# Patient Record
Sex: FEMALE | Race: WHITE | NOT HISPANIC OR LATINO
[De-identification: names, ages, dates, MRNs, and addresses within clinical notes are randomized per-mention and may not be internally consistent; named-entity substitution may affect disease eponyms.]

---

## 2022-09-13 RX ADMIN — HYDROMORPHONE HYDROCHLORIDE 1 MILLIGRAM(S): 2 INJECTION INTRAMUSCULAR; INTRAVENOUS; SUBCUTANEOUS at 00:40

## 2023-09-10 ENCOUNTER — TRANSCRIPTION ENCOUNTER (OUTPATIENT)
Age: 73
End: 2023-09-10

## 2023-09-11 ENCOUNTER — RESULT REVIEW (OUTPATIENT)
Age: 73
End: 2023-09-11

## 2023-09-11 ENCOUNTER — INPATIENT (INPATIENT)
Facility: HOSPITAL | Age: 73
LOS: 10 days | Discharge: HOME HEALTH | DRG: 853 | End: 2023-09-22
Attending: INTERNAL MEDICINE | Admitting: INTERNAL MEDICINE
Payer: MEDICARE

## 2023-09-11 VITALS
TEMPERATURE: 102 F | RESPIRATION RATE: 20 BRPM | OXYGEN SATURATION: 91 % | HEART RATE: 124 BPM | DIASTOLIC BLOOD PRESSURE: 66 MMHG | SYSTOLIC BLOOD PRESSURE: 127 MMHG | WEIGHT: 146.39 LBS

## 2023-09-11 DIAGNOSIS — K35.32 ACUTE APPENDICITIS WITH PERFORATION, LOCALIZED PERITONITIS, AND GANGRENE, WITHOUT ABSCESS: ICD-10-CM

## 2023-09-11 LAB
ALBUMIN SERPL ELPH-MCNC: 2.2 G/DL — LOW (ref 3.5–5)
ALBUMIN SERPL ELPH-MCNC: 3.3 G/DL — LOW (ref 3.5–5)
ALP SERPL-CCNC: 51 U/L — SIGNIFICANT CHANGE UP (ref 40–120)
ALP SERPL-CCNC: 71 U/L — SIGNIFICANT CHANGE UP (ref 40–120)
ALT FLD-CCNC: 17 U/L DA — SIGNIFICANT CHANGE UP (ref 10–60)
ALT FLD-CCNC: 22 U/L DA — SIGNIFICANT CHANGE UP (ref 10–60)
ANION GAP SERPL CALC-SCNC: 4 MMOL/L — LOW (ref 5–17)
ANION GAP SERPL CALC-SCNC: 8 MMOL/L — SIGNIFICANT CHANGE UP (ref 5–17)
APPEARANCE UR: CLEAR — SIGNIFICANT CHANGE UP
APTT BLD: 32.3 SEC — SIGNIFICANT CHANGE UP (ref 24.5–35.6)
AST SERPL-CCNC: 33 U/L — SIGNIFICANT CHANGE UP (ref 10–40)
AST SERPL-CCNC: 33 U/L — SIGNIFICANT CHANGE UP (ref 10–40)
BACTERIA # UR AUTO: ABNORMAL /HPF
BASE EXCESS BLDA CALC-SCNC: 0.3 MMOL/L — SIGNIFICANT CHANGE UP (ref -2–3)
BASOPHILS # BLD AUTO: 0.02 K/UL — SIGNIFICANT CHANGE UP (ref 0–0.2)
BASOPHILS # BLD AUTO: 0.03 K/UL — SIGNIFICANT CHANGE UP (ref 0–0.2)
BASOPHILS NFR BLD AUTO: 0.2 % — SIGNIFICANT CHANGE UP (ref 0–2)
BASOPHILS NFR BLD AUTO: 0.2 % — SIGNIFICANT CHANGE UP (ref 0–2)
BILIRUB SERPL-MCNC: 1.3 MG/DL — HIGH (ref 0.2–1.2)
BILIRUB SERPL-MCNC: 2.6 MG/DL — HIGH (ref 0.2–1.2)
BILIRUB UR-MCNC: NEGATIVE — SIGNIFICANT CHANGE UP
BLD GP AB SCN SERPL QL: SIGNIFICANT CHANGE UP
BLD GP AB SCN SERPL QL: SIGNIFICANT CHANGE UP
BLOOD GAS COMMENTS ARTERIAL: SIGNIFICANT CHANGE UP
BUN SERPL-MCNC: 20 MG/DL — HIGH (ref 7–18)
BUN SERPL-MCNC: 29 MG/DL — HIGH (ref 7–18)
CALCIUM SERPL-MCNC: 7.4 MG/DL — LOW (ref 8.4–10.5)
CALCIUM SERPL-MCNC: 9.5 MG/DL — SIGNIFICANT CHANGE UP (ref 8.4–10.5)
CHLORIDE SERPL-SCNC: 105 MMOL/L — SIGNIFICANT CHANGE UP (ref 96–108)
CHLORIDE SERPL-SCNC: 110 MMOL/L — HIGH (ref 96–108)
CO2 SERPL-SCNC: 25 MMOL/L — SIGNIFICANT CHANGE UP (ref 22–31)
CO2 SERPL-SCNC: 26 MMOL/L — SIGNIFICANT CHANGE UP (ref 22–31)
COLOR SPEC: SIGNIFICANT CHANGE UP
CREAT SERPL-MCNC: 1.13 MG/DL — SIGNIFICANT CHANGE UP (ref 0.5–1.3)
CREAT SERPL-MCNC: 1.41 MG/DL — HIGH (ref 0.5–1.3)
DIFF PNL FLD: ABNORMAL
EGFR: 39 ML/MIN/1.73M2 — LOW
EGFR: 51 ML/MIN/1.73M2 — LOW
EOSINOPHIL # BLD AUTO: 0 K/UL — SIGNIFICANT CHANGE UP (ref 0–0.5)
EOSINOPHIL # BLD AUTO: 0.08 K/UL — SIGNIFICANT CHANGE UP (ref 0–0.5)
EOSINOPHIL NFR BLD AUTO: 0 % — SIGNIFICANT CHANGE UP (ref 0–6)
EOSINOPHIL NFR BLD AUTO: 0.6 % — SIGNIFICANT CHANGE UP (ref 0–6)
EPI CELLS # UR: PRESENT
GLUCOSE SERPL-MCNC: 126 MG/DL — HIGH (ref 70–99)
GLUCOSE SERPL-MCNC: 129 MG/DL — HIGH (ref 70–99)
GLUCOSE UR QL: NEGATIVE MG/DL — SIGNIFICANT CHANGE UP
GRAN CASTS # UR COMP ASSIST: PRESENT
HCO3 BLDA-SCNC: 25 MMOL/L — SIGNIFICANT CHANGE UP (ref 21–28)
HCT VFR BLD CALC: 33.4 % — LOW (ref 34.5–45)
HCT VFR BLD CALC: 41.6 % — SIGNIFICANT CHANGE UP (ref 34.5–45)
HGB BLD-MCNC: 11 G/DL — LOW (ref 11.5–15.5)
HGB BLD-MCNC: 13.8 G/DL — SIGNIFICANT CHANGE UP (ref 11.5–15.5)
HYALINE CASTS # UR AUTO: PRESENT
IMM GRANULOCYTES NFR BLD AUTO: 0.2 % — SIGNIFICANT CHANGE UP (ref 0–0.9)
IMM GRANULOCYTES NFR BLD AUTO: 0.3 % — SIGNIFICANT CHANGE UP (ref 0–0.9)
INR BLD: 1.29 RATIO — HIGH (ref 0.85–1.18)
KETONES UR-MCNC: ABNORMAL MG/DL
LACTATE SERPL-SCNC: 1 MMOL/L — SIGNIFICANT CHANGE UP (ref 0.7–2)
LACTATE SERPL-SCNC: 3.5 MMOL/L — HIGH (ref 0.7–2)
LACTATE SERPL-SCNC: 4.1 MMOL/L — CRITICAL HIGH (ref 0.7–2)
LEUKOCYTE ESTERASE UR-ACNC: ABNORMAL
LYMPHOCYTES # BLD AUTO: 1.35 K/UL — SIGNIFICANT CHANGE UP (ref 1–3.3)
LYMPHOCYTES # BLD AUTO: 1.53 K/UL — SIGNIFICANT CHANGE UP (ref 1–3.3)
LYMPHOCYTES # BLD AUTO: 11.3 % — LOW (ref 13–44)
LYMPHOCYTES # BLD AUTO: 15 % — SIGNIFICANT CHANGE UP (ref 13–44)
MAGNESIUM SERPL-MCNC: 1.6 MG/DL — SIGNIFICANT CHANGE UP (ref 1.6–2.6)
MCHC RBC-ENTMCNC: 29 PG — SIGNIFICANT CHANGE UP (ref 27–34)
MCHC RBC-ENTMCNC: 29.2 PG — SIGNIFICANT CHANGE UP (ref 27–34)
MCHC RBC-ENTMCNC: 32.9 GM/DL — SIGNIFICANT CHANGE UP (ref 32–36)
MCHC RBC-ENTMCNC: 33.2 GM/DL — SIGNIFICANT CHANGE UP (ref 32–36)
MCV RBC AUTO: 88.1 FL — SIGNIFICANT CHANGE UP (ref 80–100)
MCV RBC AUTO: 88.1 FL — SIGNIFICANT CHANGE UP (ref 80–100)
MONOCYTES # BLD AUTO: 0.63 K/UL — SIGNIFICANT CHANGE UP (ref 0–0.9)
MONOCYTES # BLD AUTO: 0.86 K/UL — SIGNIFICANT CHANGE UP (ref 0–0.9)
MONOCYTES NFR BLD AUTO: 6.3 % — SIGNIFICANT CHANGE UP (ref 2–14)
MONOCYTES NFR BLD AUTO: 7 % — SIGNIFICANT CHANGE UP (ref 2–14)
NEUTROPHILS # BLD AUTO: 11.02 K/UL — HIGH (ref 1.8–7.4)
NEUTROPHILS # BLD AUTO: 6.99 K/UL — SIGNIFICANT CHANGE UP (ref 1.8–7.4)
NEUTROPHILS NFR BLD AUTO: 77.5 % — HIGH (ref 43–77)
NEUTROPHILS NFR BLD AUTO: 81.4 % — HIGH (ref 43–77)
NITRITE UR-MCNC: NEGATIVE — SIGNIFICANT CHANGE UP
NRBC # BLD: 0 /100 WBCS — SIGNIFICANT CHANGE UP (ref 0–0)
NRBC # BLD: 0 /100 WBCS — SIGNIFICANT CHANGE UP (ref 0–0)
PCO2 BLDA: 42 MMHG — HIGH (ref 32–35)
PH BLDA: 7.39 — SIGNIFICANT CHANGE UP (ref 7.35–7.45)
PH UR: 6.5 — SIGNIFICANT CHANGE UP (ref 5–8)
PHOSPHATE SERPL-MCNC: 2.3 MG/DL — LOW (ref 2.5–4.5)
PLATELET # BLD AUTO: 165 K/UL — SIGNIFICANT CHANGE UP (ref 150–400)
PLATELET # BLD AUTO: 197 K/UL — SIGNIFICANT CHANGE UP (ref 150–400)
PO2 BLDA: 35 MMHG — CRITICAL LOW (ref 83–108)
POTASSIUM SERPL-MCNC: 3.3 MMOL/L — LOW (ref 3.5–5.3)
POTASSIUM SERPL-MCNC: 3.4 MMOL/L — LOW (ref 3.5–5.3)
POTASSIUM SERPL-SCNC: 3.3 MMOL/L — LOW (ref 3.5–5.3)
POTASSIUM SERPL-SCNC: 3.4 MMOL/L — LOW (ref 3.5–5.3)
PROT SERPL-MCNC: 5.4 G/DL — LOW (ref 6–8.3)
PROT SERPL-MCNC: 7.5 G/DL — SIGNIFICANT CHANGE UP (ref 6–8.3)
PROT UR-MCNC: 300 MG/DL
PROTHROM AB SERPL-ACNC: 14.6 SEC — HIGH (ref 9.5–13)
RAPID RVP RESULT: SIGNIFICANT CHANGE UP
RBC # BLD: 3.79 M/UL — LOW (ref 3.8–5.2)
RBC # BLD: 4.72 M/UL — SIGNIFICANT CHANGE UP (ref 3.8–5.2)
RBC # FLD: 13.9 % — SIGNIFICANT CHANGE UP (ref 10.3–14.5)
RBC # FLD: 13.9 % — SIGNIFICANT CHANGE UP (ref 10.3–14.5)
RBC CASTS # UR COMP ASSIST: >50 /HPF — HIGH (ref 0–4)
SAO2 % BLDA: 56 % — SIGNIFICANT CHANGE UP
SARS-COV-2 RNA SPEC QL NAA+PROBE: SIGNIFICANT CHANGE UP
SODIUM SERPL-SCNC: 139 MMOL/L — SIGNIFICANT CHANGE UP (ref 135–145)
SODIUM SERPL-SCNC: 139 MMOL/L — SIGNIFICANT CHANGE UP (ref 135–145)
SP GR SPEC: 1.03 — SIGNIFICANT CHANGE UP (ref 1–1.03)
UROBILINOGEN FLD QL: 1 MG/DL — SIGNIFICANT CHANGE UP (ref 0.2–1)
WBC # BLD: 13.55 K/UL — HIGH (ref 3.8–10.5)
WBC # BLD: 9.02 K/UL — SIGNIFICANT CHANGE UP (ref 3.8–10.5)
WBC # FLD AUTO: 13.55 K/UL — HIGH (ref 3.8–10.5)
WBC # FLD AUTO: 9.02 K/UL — SIGNIFICANT CHANGE UP (ref 3.8–10.5)
WBC UR QL: 5 /HPF — SIGNIFICANT CHANGE UP (ref 0–5)

## 2023-09-11 PROCEDURE — 99222 1ST HOSP IP/OBS MODERATE 55: CPT | Mod: 57

## 2023-09-11 PROCEDURE — 71045 X-RAY EXAM CHEST 1 VIEW: CPT | Mod: 26

## 2023-09-11 PROCEDURE — 93010 ELECTROCARDIOGRAM REPORT: CPT

## 2023-09-11 PROCEDURE — 44970 LAPAROSCOPY APPENDECTOMY: CPT

## 2023-09-11 PROCEDURE — 74177 CT ABD & PELVIS W/CONTRAST: CPT | Mod: 26,MA

## 2023-09-11 PROCEDURE — 99291 CRITICAL CARE FIRST HOUR: CPT

## 2023-09-11 PROCEDURE — 88304 TISSUE EXAM BY PATHOLOGIST: CPT | Mod: 26

## 2023-09-11 DEVICE — STAPLER COVIDIEN TRI-STAPLE 45MM PURPLE RELOAD: Type: IMPLANTABLE DEVICE | Status: FUNCTIONAL

## 2023-09-11 DEVICE — TISSEEL 10ML: Type: IMPLANTABLE DEVICE | Status: FUNCTIONAL

## 2023-09-11 RX ORDER — MAGNESIUM SULFATE 500 MG/ML
2 VIAL (ML) INJECTION ONCE
Refills: 0 | Status: COMPLETED | OUTPATIENT
Start: 2023-09-11 | End: 2023-09-11

## 2023-09-11 RX ORDER — SODIUM CHLORIDE 9 MG/ML
2100 INJECTION INTRAMUSCULAR; INTRAVENOUS; SUBCUTANEOUS ONCE
Refills: 0 | Status: COMPLETED | OUTPATIENT
Start: 2023-09-11 | End: 2023-09-11

## 2023-09-11 RX ORDER — PIPERACILLIN AND TAZOBACTAM 4; .5 G/20ML; G/20ML
3.38 INJECTION, POWDER, LYOPHILIZED, FOR SOLUTION INTRAVENOUS ONCE
Refills: 0 | Status: DISCONTINUED | OUTPATIENT
Start: 2023-09-11 | End: 2023-09-11

## 2023-09-11 RX ORDER — PANTOPRAZOLE SODIUM 20 MG/1
40 TABLET, DELAYED RELEASE ORAL DAILY
Refills: 0 | Status: DISCONTINUED | OUTPATIENT
Start: 2023-09-11 | End: 2023-09-11

## 2023-09-11 RX ORDER — SODIUM CHLORIDE 9 MG/ML
1000 INJECTION, SOLUTION INTRAVENOUS
Refills: 0 | Status: DISCONTINUED | OUTPATIENT
Start: 2023-09-11 | End: 2023-09-11

## 2023-09-11 RX ORDER — HYDROMORPHONE HYDROCHLORIDE 2 MG/ML
2 INJECTION INTRAMUSCULAR; INTRAVENOUS; SUBCUTANEOUS ONCE
Refills: 0 | Status: DISCONTINUED | OUTPATIENT
Start: 2023-09-11 | End: 2023-09-11

## 2023-09-11 RX ORDER — POTASSIUM CHLORIDE 20 MEQ
10 PACKET (EA) ORAL
Refills: 0 | Status: COMPLETED | OUTPATIENT
Start: 2023-09-11 | End: 2023-09-12

## 2023-09-11 RX ORDER — SODIUM CHLORIDE 9 MG/ML
1000 INJECTION, SOLUTION INTRAVENOUS ONCE
Refills: 0 | Status: COMPLETED | OUTPATIENT
Start: 2023-09-11 | End: 2023-09-11

## 2023-09-11 RX ORDER — PANTOPRAZOLE SODIUM 20 MG/1
40 TABLET, DELAYED RELEASE ORAL DAILY
Refills: 0 | Status: DISCONTINUED | OUTPATIENT
Start: 2023-09-12 | End: 2023-09-14

## 2023-09-11 RX ORDER — PIPERACILLIN AND TAZOBACTAM 4; .5 G/20ML; G/20ML
3.38 INJECTION, POWDER, LYOPHILIZED, FOR SOLUTION INTRAVENOUS ONCE
Refills: 0 | Status: COMPLETED | OUTPATIENT
Start: 2023-09-11 | End: 2023-09-11

## 2023-09-11 RX ORDER — HYDROMORPHONE HYDROCHLORIDE 2 MG/ML
1 INJECTION INTRAMUSCULAR; INTRAVENOUS; SUBCUTANEOUS ONCE
Refills: 0 | Status: DISCONTINUED | OUTPATIENT
Start: 2023-09-11 | End: 2023-09-11

## 2023-09-11 RX ORDER — PIPERACILLIN AND TAZOBACTAM 4; .5 G/20ML; G/20ML
3.38 INJECTION, POWDER, LYOPHILIZED, FOR SOLUTION INTRAVENOUS ONCE
Refills: 0 | Status: COMPLETED | OUTPATIENT
Start: 2023-09-12 | End: 2023-09-12

## 2023-09-11 RX ORDER — PIPERACILLIN AND TAZOBACTAM 4; .5 G/20ML; G/20ML
3.38 INJECTION, POWDER, LYOPHILIZED, FOR SOLUTION INTRAVENOUS EVERY 8 HOURS
Refills: 0 | Status: COMPLETED | OUTPATIENT
Start: 2023-09-12 | End: 2023-09-19

## 2023-09-11 RX ORDER — ACETAMINOPHEN 500 MG
1000 TABLET ORAL ONCE
Refills: 0 | Status: COMPLETED | OUTPATIENT
Start: 2023-09-11 | End: 2023-09-11

## 2023-09-11 RX ORDER — CHLORHEXIDINE GLUCONATE 213 G/1000ML
1 SOLUTION TOPICAL
Refills: 0 | Status: DISCONTINUED | OUTPATIENT
Start: 2023-09-11 | End: 2023-09-22

## 2023-09-11 RX ADMIN — Medication 100 MILLIEQUIVALENT(S): at 23:42

## 2023-09-11 RX ADMIN — Medication 25 GRAM(S): at 23:15

## 2023-09-11 RX ADMIN — SODIUM CHLORIDE 1000 MILLILITER(S): 9 INJECTION, SOLUTION INTRAVENOUS at 18:48

## 2023-09-11 RX ADMIN — PIPERACILLIN AND TAZOBACTAM 200 GRAM(S): 4; .5 INJECTION, POWDER, LYOPHILIZED, FOR SOLUTION INTRAVENOUS at 14:05

## 2023-09-11 RX ADMIN — PIPERACILLIN AND TAZOBACTAM 25 GRAM(S): 4; .5 INJECTION, POWDER, LYOPHILIZED, FOR SOLUTION INTRAVENOUS at 17:54

## 2023-09-11 RX ADMIN — Medication 400 MILLIGRAM(S): at 23:42

## 2023-09-11 RX ADMIN — HYDROMORPHONE HYDROCHLORIDE 1 MILLIGRAM(S): 2 INJECTION INTRAMUSCULAR; INTRAVENOUS; SUBCUTANEOUS at 19:00

## 2023-09-11 RX ADMIN — SODIUM CHLORIDE 500 MILLILITER(S): 9 INJECTION, SOLUTION INTRAVENOUS at 15:29

## 2023-09-11 RX ADMIN — HYDROMORPHONE HYDROCHLORIDE 1 MILLIGRAM(S): 2 INJECTION INTRAMUSCULAR; INTRAVENOUS; SUBCUTANEOUS at 18:48

## 2023-09-11 RX ADMIN — SODIUM CHLORIDE 2100 MILLILITER(S): 9 INJECTION INTRAMUSCULAR; INTRAVENOUS; SUBCUTANEOUS at 14:06

## 2023-09-11 RX ADMIN — CHLORHEXIDINE GLUCONATE 1 APPLICATION(S): 213 SOLUTION TOPICAL at 18:00

## 2023-09-11 RX ADMIN — Medication 100 MILLIEQUIVALENT(S): at 23:41

## 2023-09-11 NOTE — H&P ADULT - NSHPPHYSICALEXAM_GEN_ALL_CORE
PHYSICAL EXAM:  GENERAL: Noted to be in moderate distress, saturating well on 2L NC   HEAD:  Atraumatic, Normocephalic  EYES: EOMI, perrla   NECK: Supple  CHEST/LUNG: Clear to auscultation bilaterally; No wheeze; No crackles; No accessory muscles used  HEART: Regular rate and rhythm; No murmurs; gallops or rubs   ABDOMEN: Diffuse tenderness noted, guarding noted   EXTREMITIES:  2+ Peripheral Pulses, No cyanosis or edema  PSYCH: AAOx1 (to name only)  NEUROLOGY: Unable to fully assess neurological status, patient only following simple commands   SKIN: No rashes or lesions

## 2023-09-11 NOTE — ED ADULT NURSE NOTE - NSFALLHARMRISKINTERV_ED_ALL_ED
Assistance OOB with selected safe patient handling equipment if applicable/Assistance with ambulation/Communicate risk of Fall with Harm to all staff, patient, and family/Monitor gait and stability/Monitor for mental status changes and reorient to person, place, and time, as needed/Move patient closer to nursing station/within visual sight of ED staff/Provide visual cue: red socks, yellow wristband, yellow gown, etc/Reinforce activity limits and safety measures with patient and family/Toileting schedule using arm’s reach rule for commode and bathroom/Use of alarms - bed, stretcher, chair and/or video monitoring/Bed in lowest position, wheels locked, appropriate side rails in place/Call bell, personal items and telephone in reach/Instruct patient to call for assistance before getting out of bed/chair/stretcher/Non-slip footwear applied when patient is off stretcher/[place] to call system/Physically safe environment - no spills, clutter or unnecessary equipment/Purposeful Proactive Rounding/Room/bathroom lighting operational, light cord in reach

## 2023-09-11 NOTE — ED PROVIDER NOTE - CLINICAL SUMMARY MEDICAL DECISION MAKING FREE TEXT BOX
We will start sepsis bundle and obtain CT abdomen pelvis for possible intra-abdominal cause.  Will require admission.

## 2023-09-11 NOTE — ED PROVIDER NOTE - PHYSICAL EXAMINATION
Exam:  General: Patient ill appearing, febrile, tachycardic otherwise vital signs within normal limits  HEENT: airway patent with dry mucous membranes  Cardiac: regular tachycardia S1/S2 with strong peripheral pulses  Respiratory: lungs clear, mildly tachypneic  GI: abdomen diffusely tender

## 2023-09-11 NOTE — H&P ADULT - HISTORY OF PRESENT ILLNESS
Patient is a 73y old  Female who presents with a chief complaint of     HPI:      Allergies    sulfa drugs (Unknown)    Intolerances         Patient is a 73y old  Female with hx of COPD (active smoker), peripheral neuropathy presents as per ED documentation with abdominal pain. Patient unable to provide history, and history is provided by Patient sister (Vlad, [phone]) and patient's  Leroy ([phone]). Patient's sister states that on Saturday night patient started to complain of abdominal pain, and had 1 episode of NBNB emesis. Denies any changes to bowel habits, hematochei        Patient is a 73y old  Female with hx of COPD (active smoker, not on home oxygen), peripheral neuropathy presents as per ED documentation with abdominal pain. Patient unable to provide history, and history is provided by Patient sister (Vlad, [phone]) and patient's  Leroy ([phone]). Patient's sister states that on Saturday night patient started to complain of abdominal pain, and had 1 episode of NBNB emesis. Denies any changes to bowel habits, hematochezia. Patient's sister denies fevers or chills or prior episodes. At baseline patient is fully functional, denies use of ambulatory devices.     [facility] conciliation needs to be done.

## 2023-09-11 NOTE — PATIENT PROFILE ADULT - FALL HARM RISK - HARM RISK INTERVENTIONS
Assistance with ambulation/Assistance OOB with selected safe patient handling equipment/Communicate Risk of Fall with Harm to all staff/Monitor for mental status changes/Monitor gait and stability/Move patient closer to nurses' station/Reinforce activity limits and safety measures with patient and family/Reorient to person, place and time as needed/Sit up slowly, dangle for a short time, stand at bedside before walking/Tailored Fall Risk Interventions/Toileting schedule using arm’s reach rule for commode and bathroom/Use of alarms - bed, chair and/or voice tab/Visual Cue: Yellow wristband and red socks/Bed in lowest position, wheels locked, appropriate side rails in place/Call bell, personal items and telephone in reach/Instruct patient to call for assistance before getting out of bed or chair/Non-slip footwear when patient is out of bed/[place] to call system/Physically safe environment - no spills, clutter or unnecessary equipment/Purposeful Proactive Rounding/Room/bathroom lighting operational, light cord in reach

## 2023-09-11 NOTE — BRIEF OPERATIVE NOTE - OPERATION/FINDINGS
Peritonitis, acute appendicitis with gangrene and perforation. Laparoscopic appendectomy, 3 L irrigation. 2 MARY LOU drains

## 2023-09-11 NOTE — ED PROVIDER NOTE - OBJECTIVE STATEMENT
73-year-old woman brought in by EMS for altered mental status associated with fevers and abdominal pain.  Began having symptoms about 3 days ago that are progressively worsening until today when she was confused which she does not normally.  She was found by EMS to be hypoxic in the field to 90 on room air.  Patient has a history of active smoking.  Patient unable to give reliable history at this time.  History obtained at bedside from patient's  and sister.

## 2023-09-11 NOTE — BRIEF OPERATIVE NOTE - NSICDXBRIEFPOSTOP_GEN_ALL_CORE_FT
POST-OP DIAGNOSIS:  Acute appendicitis with perforation, localized peritonitis, abscess, and gangrene 11-Sep-2023 21:15:50  Adriel Melendez

## 2023-09-11 NOTE — H&P ADULT - ATTENDING COMMENTS
IMP: This is a 73 yr old woman  with  COPD,  neuropathy presents with abdominal pain. Upon evaluation in ED, patient noted with temp of 101.8, BP of 127/66, saturating 91% on Room air with hr of 124bpm. Physical examination noted with diffuse tenderness in abdominal region with guarding. Labs significant for leukocytosis of 13, lactate of 3.5 and scr of 1.41. CT abdomen and pelvis noted for free air in peritoneum and findings for perforated acute appendicitis with findings of 6mm gall bladder lesion and right adnexal lesion. CXR negative. Surgery consulted and patient is scheduled for urgent ex lap, abdominal washout and possible bowel resection. ICU consulted for sepsis 2/2 perforated appendicitis/ peritonitis  Patient admitted to ICU for sepsis 2/2 acute appendicitis.     - Acute Encephalopathy   - Sepsis  - Acute perforated appendicitis  - COPD  - Neuropathy  - Elevated lactate  - Gall bladder wall lesion   - MAYRA  - RIght Adnexal Lesion    - Active smoker      Plan     - Admit to ICU   - No sedation   - Pain control   - Encephalopathy due to sepsis   - O2 supp as needed   - Hemodynamic monitoring   - IVF  - Trend lactate   - Iv antibx to cover gram neg   - F/u cultures  - Surgery for exploration   - ID eval   - NPO   - Asp precaution   - Lindo cath   - Pelvic US to evaluate adnexa lesion vs MRI of abd   - GI for gallbladder lesion   - DVT GI prophy

## 2023-09-11 NOTE — ED PROVIDER NOTE - PROGRESS NOTE DETAILS
CT AP independently interpreted by myself, noting appendicitis with localized perforation and gas tracking.  Spoke with radiology who agrees.  Surgery consulted and requesting ICU consultation.

## 2023-09-11 NOTE — H&P ADULT - CONVERSATION DETAILS
Spoke to patient's HCP  Leroy and he states he would like CPR and all measures that can be taken to prolong his wife's lifespan. Patient is FULL CODE.

## 2023-09-11 NOTE — ED PROVIDER NOTE - NS_EDPROVIDERDISPOUSERTYPE_ED_A_ED
Attending Attestation (For Attendings USE Only)... PAST MEDICAL HISTORY:  Atrial fibrillation     PVCs (premature ventricular contractions)     Strabismus

## 2023-09-11 NOTE — H&P ADULT - NSICDXPASTMEDICALHX_GEN_ALL_CORE_FT
PAST MEDICAL HISTORY:  H/O neuropathy     History of COPD     HLD (hyperlipidemia)     HTN (hypertension)

## 2023-09-11 NOTE — ED ADULT TRIAGE NOTE - CHIEF COMPLAINT QUOTE
PT BIBA AS NOTIFICATION FOR AMS, FEVER, COUGH AND ABDOMINAL PAIN X 3 DAYS. EMS REPORTS O2SAT IN THE FIELD WAS 90%

## 2023-09-11 NOTE — CONSULT NOTE ADULT - SUBJECTIVE AND OBJECTIVE BOX
74 yo female pmhx htn to ER c/o 3days abdominal pain, no n/v no fever ,no cp. no change in BM. Pt states she has had chronic pain for some time however last 3 days worse pain. IN ER CT suggestive of perforated appendicitis. Pt clinically septic with peritonitis noted in ER.  unknown Wt loss. no hx hemetemesis or hematochezia.  never colonoscopy.    ICU Vital Signs Last 24 Hrs  T(C): 38.8 (11 Sep 2023 11:03), Max: 38.8 (11 Sep 2023 11:03)  T(F): 101.8 (11 Sep 2023 11:03), Max: 101.8 (11 Sep 2023 11:03)  HR: 124 (11 Sep 2023 11:03) (124 - 124)  BP: 127/66 (11 Sep 2023 11:03) (127/66 - 127/66)  BP(mean): --  ABP: --  ABP(mean): --  RR: 20 (11 Sep 2023 11:03) (20 - 20)  SpO2: 91% (11 Sep 2023 11:03) (91% - 91%)    O2 Parameters below as of 11 Sep 2023 11:03  Patient On (Oxygen Delivery Method): room air        pt confused  Abd: generalized tenderness, +rebound tenderness all quasrants worse rlq  no hernias, no masses palpable  lngs:scattered ralses  EXT: no cce  cardiac s1 s2 tacchycardia                          13.8   13.55 )-----------( 197      ( 11 Sep 2023 12:45 )             41.6   09-11    139  |  105  |  29<H>  ----------------------------<  126<H>  3.3<L>   |  26  |  1.41<H>    Ca    9.5      11 Sep 2023 12:45    TPro  7.5  /  Alb  3.3<L>  /  TBili  2.6<H>  /  DBili  x   /  AST  33  /  ALT  22  /  AlkPhos  71  09-11    < from: CT Abdomen and Pelvis w/ IV Cont (09.11.23 @ 14:26) >    Upon further review, there is a mild hiatal hernia.    Dr. Hathaway was informed about the liver dome lesion which may represent a   nonspecificmass lesion, abscess or a hemangioma.    --- End of Report ---    *** END OF ADDENDUM # 1 ***      PROCEDURE DATE:  09/11/2023          INTERPRETATION:  CLINICAL INFORMATION: Fever. Sepsis. Altered mental   status. Abdominal tenderness.    COMPARISON: None.    CONTRAST/COMPLICATIONS:  IV Contrast: Omnipaque 350  90 cc administered   10 cc discarded  Oral Contrast: NONE  Complications: None reported at time of study completion    PROCEDURE:  CT of the Abdomen and Pelvis was performed.  Sagittal and coronal reformats were performed.    FINDINGS: The examination is limited by respiratory motion artifact.  LOWER CHEST: Small bilateral atelectasis.    LIVER: 3.4 cm hypodense lesion with rim enhancement in the liver dome   (image 20 series 2); detail of the enhancement cannot be characterized   accurately due to respiratory motion artifact. This may represent a   nonspecific mass lesion, abscess, or hemangioma. In hepatic segment 4,   there is a 3.4 cm hypodense lesion with peripheral nodular enhancement;   this may represent a hemangioma. If clinically indicated, abdominal MR   without and with IV contrast may be pursued for further evaluation.  BILE DUCTS: Normal caliber.  GALLBLADDER: Focal 6 mm enhancing lesion at the gallbladder fundalwall;   attention should be directed to this on the abdominal MR  SPLEEN: Within normal limits.  PANCREAS: Within normal limits.  ADRENALS: Within normal limits.  KIDNEYS/URETERS: Within normal limits.    BLADDER: Within normal limits.  REPRODUCTIVE ORGANS: Calcified uterine fibroids. 5.2 cm soft tissue   density structure in the right adnexa may represent an exophytic uterine   fibroid, or a right ovarian mass. If clinically indicated, pelvic   ultrasound may be pursued for further evaluation.    BOWEL: No bowel obstruction. Appendix dilated measuring up to 11 mm in   caliber with adjacent stranding and extraluminal air. Small free air is   also noted elsewhere in the peritoneum. Findings are compatible with   perforated acute appendicitis. 1.0 cm appendicolith at the orifice of the   appendix.  PERITONEUM: Trace right ascites. No drainable abscess is identified.  VESSELS: Mild calcified atherosclerotic disease.  RETROPERITONEUM/LYMPH NODES: No lymphadenopathy.  ABDOMINAL WALL: Within normal limits.  BONES: Degenerative spondylosis. Grade 1 anterolisthesis at L4-5. Small   sclerotic lesion in the medullary space of the proximal right femur may   represent an enchondroma.    IMPRESSION: Appendix dilated measuring up to 11 mm in caliber with   adjacent stranding and extraluminal air. Small free air is also noted   elsewhere in the peritoneum. Findings are compatible with perforated   acute appendicitis. 1.0 cm appendicolith at the orifice of the appendix.   No drainable abscess is identified within the peritoneum.    Trace right ascites.    Lesions in the liver as described above. Focal 6 mm enhancing lesion at   the gallbladder fundal wall if clinically indicated, abdominal MR without   and with IV contrast may be pursued for further evaluation.    5.2 cm soft tissue density structure in the right adnexa may represent an   exophytic uterine fibroid, or a right ovarian mass. If clinically   indicated, pelvic ultrasound may be pursued for further evaluation.      < end of copied text >

## 2023-09-11 NOTE — PATIENT PROFILE ADULT - NSPROGENOTHERPROVIDER_GEN_A_NUR
Straight cath completed with assistance of ER LEFTY Joseph, sterile procedure followed     Regine Louise  07/14/23 2029       Regine Louise  07/14/23 2033 unknown patient mentally impaired and nonverbal.

## 2023-09-11 NOTE — H&P ADULT - ASSESSMENT
Patient is a 74 yo female with hx of COPD, not on home oxygen, DM  Patient is a 72 yo female with hx of COPD, not on home oxygen, neuropathy presents with abdominal pain. Upon evaluation in ED, patient noted with temp of 101.8, BP of 127/66, saturating 91% on Room air with hr of 124bpm. Physical examination noted with diffuse tenderness in abdominal region with guarding. Labs significant for leukocytosis of 13, lactate of 3.5 and scr of 1.41. CT abdomen and pelvis noted for free air in peritoneum and findings for perforated acute appendicitis with fidings of 6mm gall bladder lesion and right adnexal lesion. CXR negative. Surgery consulted and patient is scheduled for urgent ex lap, abdominal washout and possible bowel resection. ICU consulted for sepsis 2/2 perforated appendicitis. Patient admitted to ICU for sepsis 2/2 acute appendicitis.     #Acute Encephalopathy   #Sepsis  #Acute perforated appendicitis  #COPD  #Neuropathy  # Elevated lactate  # Gall bladder wall lesion   # MAYRA  #RIght Adnexal Lesion      Neurology  #Acute encephalopathy   - patient noted at baseline to be Alert and oriented x3  - currently Alert and oriented x1  - unable to complete full neurological exam   - can be 2/2 Sepsis induced vs possible CVA   - unable to get CT head due to receiving IV contrast  - admit to ICU   -continue with zosyn   - trend lactate  - will give 2L LR   - f/u urine and blood cultures   - Surgery reccs appreciated     Cardiovascular  - No acute issues   - please confirm home medications     Pulmonology  # Hx of COPD   - patient with hx of COPD   - not on home oxygen   - confirm home medications     GI  # Sepsis 2/2 Perforated acute appendicitis   - noted with findings of acute perforated appendicitis on CT scan  - hemodynamically stable, not requiring pressors   - NPO  - IVF hydration   - continue with zosyn   - Plan is for diagnostic laparoscopy with washout and possible bowel resection  - trend lactate  - f/u blood and urine cultures  - f/u post operative labs   - ID consulted Dr. Moffett     #Gallbladder wall lesion  - patient on CT noted with Focal 6 mm enhancing lesion at the gallbladder fundal wall  - Consider MR abdomen when clinically stable      Renal  #MAYRA  - patient noted to have Scr of 1.41  - avoid nephrotoxic agents  - f/u Scr levels     Infectious Disease  # Sepsis 2/2 Perforated acute appendicitis   - noted with findings of acute perforated appendicitis on CT scan  - hemodynamically stable, not requiring pressors   - NPO  - IVF hydration   - continue with zosyn   - Plan is for diagnostic laparoscopy with washout and possible bowel resection  - trend lactate  - f/u blood and urine cultures  - f/u post operative labs   - ID consulted Dr. Moffett     Heme  - No acute issues     Endo  #Neuropathy   - confirm home medications     Skin  - No acute issues     Reproductive   #Right Adnexal lesion  - CT abdomen showing 5.2 cm soft tissue density structure in the right adnexa may represent an exophytic uterine fibroid, or a right ovarian mass  - f/u pelvic US when stable     Code Status: Full Code    Disposition: Patient accepted and transferred to ICU

## 2023-09-12 LAB
ALBUMIN SERPL ELPH-MCNC: 2.1 G/DL — LOW (ref 3.5–5)
ALP SERPL-CCNC: 51 U/L — SIGNIFICANT CHANGE UP (ref 40–120)
ALT FLD-CCNC: 19 U/L DA — SIGNIFICANT CHANGE UP (ref 10–60)
ANION GAP SERPL CALC-SCNC: 1 MMOL/L — LOW (ref 5–17)
AST SERPL-CCNC: 37 U/L — SIGNIFICANT CHANGE UP (ref 10–40)
BILIRUB SERPL-MCNC: 0.9 MG/DL — SIGNIFICANT CHANGE UP (ref 0.2–1.2)
BUN SERPL-MCNC: 19 MG/DL — HIGH (ref 7–18)
CALCIUM SERPL-MCNC: 7.5 MG/DL — LOW (ref 8.4–10.5)
CHLORIDE SERPL-SCNC: 112 MMOL/L — HIGH (ref 96–108)
CO2 SERPL-SCNC: 27 MMOL/L — SIGNIFICANT CHANGE UP (ref 22–31)
CREAT SERPL-MCNC: 1.02 MG/DL — SIGNIFICANT CHANGE UP (ref 0.5–1.3)
CULTURE RESULTS: NO GROWTH — SIGNIFICANT CHANGE UP
EGFR: 58 ML/MIN/1.73M2 — LOW
GLUCOSE SERPL-MCNC: 116 MG/DL — HIGH (ref 70–99)
GRAM STN FLD: SIGNIFICANT CHANGE UP
HCT VFR BLD CALC: 32.4 % — LOW (ref 34.5–45)
HGB BLD-MCNC: 10.7 G/DL — LOW (ref 11.5–15.5)
MAGNESIUM SERPL-MCNC: 2.2 MG/DL — SIGNIFICANT CHANGE UP (ref 1.6–2.6)
MCHC RBC-ENTMCNC: 29.2 PG — SIGNIFICANT CHANGE UP (ref 27–34)
MCHC RBC-ENTMCNC: 33 GM/DL — SIGNIFICANT CHANGE UP (ref 32–36)
MCV RBC AUTO: 88.3 FL — SIGNIFICANT CHANGE UP (ref 80–100)
MRSA PCR RESULT.: SIGNIFICANT CHANGE UP
NIGHT BLUE STAIN TISS: SIGNIFICANT CHANGE UP
NRBC # BLD: 0 /100 WBCS — SIGNIFICANT CHANGE UP (ref 0–0)
PHOSPHATE SERPL-MCNC: 1.6 MG/DL — LOW (ref 2.5–4.5)
PLATELET # BLD AUTO: 143 K/UL — LOW (ref 150–400)
POTASSIUM SERPL-MCNC: 3.7 MMOL/L — SIGNIFICANT CHANGE UP (ref 3.5–5.3)
POTASSIUM SERPL-SCNC: 3.7 MMOL/L — SIGNIFICANT CHANGE UP (ref 3.5–5.3)
PROT SERPL-MCNC: 5.1 G/DL — LOW (ref 6–8.3)
RBC # BLD: 3.67 M/UL — LOW (ref 3.8–5.2)
RBC # FLD: 13.6 % — SIGNIFICANT CHANGE UP (ref 10.3–14.5)
S AUREUS DNA NOSE QL NAA+PROBE: SIGNIFICANT CHANGE UP
SODIUM SERPL-SCNC: 140 MMOL/L — SIGNIFICANT CHANGE UP (ref 135–145)
SPECIMEN SOURCE: SIGNIFICANT CHANGE UP
SPECIMEN SOURCE: SIGNIFICANT CHANGE UP
WBC # BLD: 5.41 K/UL — SIGNIFICANT CHANGE UP (ref 3.8–10.5)
WBC # FLD AUTO: 5.41 K/UL — SIGNIFICANT CHANGE UP (ref 3.8–10.5)

## 2023-09-12 PROCEDURE — 71045 X-RAY EXAM CHEST 1 VIEW: CPT | Mod: 26

## 2023-09-12 RX ORDER — SODIUM CHLORIDE 9 MG/ML
1000 INJECTION, SOLUTION INTRAVENOUS ONCE
Refills: 0 | Status: COMPLETED | OUTPATIENT
Start: 2023-09-12 | End: 2023-09-12

## 2023-09-12 RX ORDER — ACETAMINOPHEN 500 MG
1000 TABLET ORAL ONCE
Refills: 0 | Status: COMPLETED | OUTPATIENT
Start: 2023-09-12 | End: 2023-09-12

## 2023-09-12 RX ORDER — DEXMEDETOMIDINE HYDROCHLORIDE IN 0.9% SODIUM CHLORIDE 4 UG/ML
0.2 INJECTION INTRAVENOUS
Qty: 200 | Refills: 0 | Status: DISCONTINUED | OUTPATIENT
Start: 2023-09-12 | End: 2023-09-12

## 2023-09-12 RX ORDER — HYDROMORPHONE HYDROCHLORIDE 2 MG/ML
2 INJECTION INTRAMUSCULAR; INTRAVENOUS; SUBCUTANEOUS ONCE
Refills: 0 | Status: DISCONTINUED | OUTPATIENT
Start: 2023-09-12 | End: 2023-09-12

## 2023-09-12 RX ORDER — MORPHINE SULFATE 50 MG/1
5 CAPSULE, EXTENDED RELEASE ORAL ONCE
Refills: 0 | Status: DISCONTINUED | OUTPATIENT
Start: 2023-09-12 | End: 2023-09-12

## 2023-09-12 RX ORDER — AMLODIPINE BESYLATE 2.5 MG/1
1 TABLET ORAL
Refills: 0 | DISCHARGE

## 2023-09-12 RX ORDER — MORPHINE SULFATE 50 MG/1
2 CAPSULE, EXTENDED RELEASE ORAL EVERY 4 HOURS
Refills: 0 | Status: DISCONTINUED | OUTPATIENT
Start: 2023-09-12 | End: 2023-09-12

## 2023-09-12 RX ORDER — POTASSIUM PHOSPHATE, MONOBASIC POTASSIUM PHOSPHATE, DIBASIC 236; 224 MG/ML; MG/ML
30 INJECTION, SOLUTION INTRAVENOUS ONCE
Refills: 0 | Status: COMPLETED | OUTPATIENT
Start: 2023-09-12 | End: 2023-09-12

## 2023-09-12 RX ORDER — HYDROMORPHONE HYDROCHLORIDE 2 MG/ML
1 INJECTION INTRAMUSCULAR; INTRAVENOUS; SUBCUTANEOUS EVERY 6 HOURS
Refills: 0 | Status: DISCONTINUED | OUTPATIENT
Start: 2023-09-12 | End: 2023-09-13

## 2023-09-12 RX ORDER — MORPHINE SULFATE 50 MG/1
1 CAPSULE, EXTENDED RELEASE ORAL EVERY 4 HOURS
Refills: 0 | Status: DISCONTINUED | OUTPATIENT
Start: 2023-09-12 | End: 2023-09-12

## 2023-09-12 RX ORDER — SODIUM CHLORIDE 9 MG/ML
1000 INJECTION, SOLUTION INTRAVENOUS
Refills: 0 | Status: DISCONTINUED | OUTPATIENT
Start: 2023-09-12 | End: 2023-09-12

## 2023-09-12 RX ORDER — MORPHINE SULFATE 50 MG/1
2 CAPSULE, EXTENDED RELEASE ORAL ONCE
Refills: 0 | Status: DISCONTINUED | OUTPATIENT
Start: 2023-09-12 | End: 2023-09-12

## 2023-09-12 RX ORDER — LISINOPRIL 2.5 MG/1
1 TABLET ORAL
Refills: 0 | DISCHARGE

## 2023-09-12 RX ORDER — MORPHINE SULFATE 50 MG/1
1 CAPSULE, EXTENDED RELEASE ORAL ONCE
Refills: 0 | Status: DISCONTINUED | OUTPATIENT
Start: 2023-09-12 | End: 2023-09-12

## 2023-09-12 RX ORDER — SODIUM CHLORIDE 9 MG/ML
1000 INJECTION, SOLUTION INTRAVENOUS
Refills: 0 | Status: DISCONTINUED | OUTPATIENT
Start: 2023-09-12 | End: 2023-09-14

## 2023-09-12 RX ORDER — DEXMEDETOMIDINE HYDROCHLORIDE IN 0.9% SODIUM CHLORIDE 4 UG/ML
0.3 INJECTION INTRAVENOUS
Qty: 200 | Refills: 0 | Status: DISCONTINUED | OUTPATIENT
Start: 2023-09-12 | End: 2023-09-13

## 2023-09-12 RX ADMIN — Medication 400 MILLIGRAM(S): at 21:45

## 2023-09-12 RX ADMIN — PIPERACILLIN AND TAZOBACTAM 25 GRAM(S): 4; .5 INJECTION, POWDER, LYOPHILIZED, FOR SOLUTION INTRAVENOUS at 01:52

## 2023-09-12 RX ADMIN — HYDROMORPHONE HYDROCHLORIDE 1 MILLIGRAM(S): 2 INJECTION INTRAMUSCULAR; INTRAVENOUS; SUBCUTANEOUS at 13:12

## 2023-09-12 RX ADMIN — MORPHINE SULFATE 2 MILLIGRAM(S): 50 CAPSULE, EXTENDED RELEASE ORAL at 05:39

## 2023-09-12 RX ADMIN — SODIUM CHLORIDE 1000 MILLILITER(S): 9 INJECTION, SOLUTION INTRAVENOUS at 10:38

## 2023-09-12 RX ADMIN — HYDROMORPHONE HYDROCHLORIDE 2 MILLIGRAM(S): 2 INJECTION INTRAMUSCULAR; INTRAVENOUS; SUBCUTANEOUS at 11:00

## 2023-09-12 RX ADMIN — PIPERACILLIN AND TAZOBACTAM 25 GRAM(S): 4; .5 INJECTION, POWDER, LYOPHILIZED, FOR SOLUTION INTRAVENOUS at 06:55

## 2023-09-12 RX ADMIN — MORPHINE SULFATE 5 MILLIGRAM(S): 50 CAPSULE, EXTENDED RELEASE ORAL at 01:51

## 2023-09-12 RX ADMIN — MORPHINE SULFATE 2 MILLIGRAM(S): 50 CAPSULE, EXTENDED RELEASE ORAL at 05:09

## 2023-09-12 RX ADMIN — Medication 1000 MILLIGRAM(S): at 23:04

## 2023-09-12 RX ADMIN — SODIUM CHLORIDE 1000 MILLILITER(S): 9 INJECTION, SOLUTION INTRAVENOUS at 06:56

## 2023-09-12 RX ADMIN — HYDROMORPHONE HYDROCHLORIDE 1 MILLIGRAM(S): 2 INJECTION INTRAMUSCULAR; INTRAVENOUS; SUBCUTANEOUS at 13:30

## 2023-09-12 RX ADMIN — PANTOPRAZOLE SODIUM 40 MILLIGRAM(S): 20 TABLET, DELAYED RELEASE ORAL at 12:51

## 2023-09-12 RX ADMIN — PIPERACILLIN AND TAZOBACTAM 25 GRAM(S): 4; .5 INJECTION, POWDER, LYOPHILIZED, FOR SOLUTION INTRAVENOUS at 21:46

## 2023-09-12 RX ADMIN — DEXMEDETOMIDINE HYDROCHLORIDE IN 0.9% SODIUM CHLORIDE 5.2 MICROGRAM(S)/KG/HR: 4 INJECTION INTRAVENOUS at 07:27

## 2023-09-12 RX ADMIN — HYDROMORPHONE HYDROCHLORIDE 1 MILLIGRAM(S): 2 INJECTION INTRAMUSCULAR; INTRAVENOUS; SUBCUTANEOUS at 17:42

## 2023-09-12 RX ADMIN — MORPHINE SULFATE 5 MILLIGRAM(S): 50 CAPSULE, EXTENDED RELEASE ORAL at 02:11

## 2023-09-12 RX ADMIN — DEXMEDETOMIDINE HYDROCHLORIDE IN 0.9% SODIUM CHLORIDE 5.2 MICROGRAM(S)/KG/HR: 4 INJECTION INTRAVENOUS at 06:56

## 2023-09-12 RX ADMIN — Medication 1000 MILLIGRAM(S): at 00:12

## 2023-09-12 RX ADMIN — MORPHINE SULFATE 2 MILLIGRAM(S): 50 CAPSULE, EXTENDED RELEASE ORAL at 08:00

## 2023-09-12 RX ADMIN — POTASSIUM PHOSPHATE, MONOBASIC POTASSIUM PHOSPHATE, DIBASIC 83.33 MILLIMOLE(S): 236; 224 INJECTION, SOLUTION INTRAVENOUS at 07:26

## 2023-09-12 RX ADMIN — MORPHINE SULFATE 2 MILLIGRAM(S): 50 CAPSULE, EXTENDED RELEASE ORAL at 03:31

## 2023-09-12 RX ADMIN — Medication 100 MILLIEQUIVALENT(S): at 01:52

## 2023-09-12 RX ADMIN — MORPHINE SULFATE 2 MILLIGRAM(S): 50 CAPSULE, EXTENDED RELEASE ORAL at 07:45

## 2023-09-12 RX ADMIN — HYDROMORPHONE HYDROCHLORIDE 1 MILLIGRAM(S): 2 INJECTION INTRAMUSCULAR; INTRAVENOUS; SUBCUTANEOUS at 23:08

## 2023-09-12 RX ADMIN — HYDROMORPHONE HYDROCHLORIDE 2 MILLIGRAM(S): 2 INJECTION INTRAMUSCULAR; INTRAVENOUS; SUBCUTANEOUS at 10:45

## 2023-09-12 RX ADMIN — MORPHINE SULFATE 2 MILLIGRAM(S): 50 CAPSULE, EXTENDED RELEASE ORAL at 03:01

## 2023-09-12 RX ADMIN — HYDROMORPHONE HYDROCHLORIDE 1 MILLIGRAM(S): 2 INJECTION INTRAMUSCULAR; INTRAVENOUS; SUBCUTANEOUS at 18:00

## 2023-09-12 RX ADMIN — CHLORHEXIDINE GLUCONATE 1 APPLICATION(S): 213 SOLUTION TOPICAL at 06:51

## 2023-09-12 RX ADMIN — DEXMEDETOMIDINE HYDROCHLORIDE IN 0.9% SODIUM CHLORIDE 5.2 MICROGRAM(S)/KG/HR: 4 INJECTION INTRAVENOUS at 21:46

## 2023-09-12 RX ADMIN — PIPERACILLIN AND TAZOBACTAM 25 GRAM(S): 4; .5 INJECTION, POWDER, LYOPHILIZED, FOR SOLUTION INTRAVENOUS at 15:16

## 2023-09-12 NOTE — CONSULT NOTE ADULT - SUBJECTIVE AND OBJECTIVE BOX
HPI:  Patient is a 73y old  Female with hx of COPD (active smoker, not on home oxygen), peripheral neuropathy presents as per ED documentation with abdominal pain. Patient unable to provide history, and history is provided by Patient sister (Vlad, [phone]) and patient's  Leroy ([phone]). Patient's sister states that on Saturday night patient started to complain of abdominal pain, and had 1 episode of NBNB emesis. Denies any changes to bowel habits, hematochezia. Patient's sister denies fevers or chills or prior episodes. At baseline patient is fully functional, denies use of ambulatory devices.     Med Recc conciliation needs to be done.         (11 Sep 2023 15:56)      PAST MEDICAL & SURGICAL HISTORY:  HTN (hypertension)      HLD (hyperlipidemia)      H/O neuropathy      History of COPD          sulfa drugs (Unknown)      Meds:  chlorhexidine 2% Cloths 1 Application(s) Topical <User Schedule>  dexMEDEtomidine Infusion 0.3 MICROgram(s)/kG/Hr IV Continuous <Continuous>  HYDROmorphone  Injectable 1 milliGRAM(s) IV Push every 6 hours  lactated ringers. 1000 milliLiter(s) IV Continuous <Continuous>  pantoprazole  Injectable 40 milliGRAM(s) IV Push daily  piperacillin/tazobactam IVPB.. 3.375 Gram(s) IV Intermittent every 8 hours      SOCIAL HISTORY:  Smoker:  YES / NO        PACK YEARS:                         WHEN QUIT?  ETOH use:  YES / NO               FREQUENCY / QUANTITY:  [employer] Drug use:  YES / NO  Occupation:  Assisted device use (Cane / Walker):  Live with:    FAMILY HISTORY:  FH: HTN (hypertension)    Family history of diabetes mellitus (DM)        VITALS:  Vital Signs Last 24 Hrs  T(C): 36.4 (12 Sep 2023 16:11), Max: 36.6 (12 Sep 2023 04:48)  T(F): 97.6 (12 Sep 2023 16:11), Max: 97.9 (12 Sep 2023 04:48)  HR: 93 (12 Sep 2023 17:00) (57 - 113)  BP: 113/54 (12 Sep 2023 17:00) (71/49 - 146/82)  BP(mean): 72 (12 Sep 2023 17:00) (50 - 103)  RR: 25 (12 Sep 2023 17:00) (10 - 25)  SpO2: 93% (12 Sep 2023 17:00) (89% - 98%)    Parameters below as of 11 Sep 2023 18:30  Patient On (Oxygen Delivery Method): nasal cannula  O2 Flow (L/min): 2      LABS/DIAGNOSTIC TESTS:                          10.7   5.41  )-----------( 143      ( 12 Sep 2023 04:51 )             32.4     WBC Count: 5.41 K/uL (09-12 @ 04:51)  WBC Count: 9.02 K/uL (09-11 @ 22:00)  WBC Count: 13.55 K/uL (09-11 @ 12:45)      09-12    140  |  112<H>  |  19<H>  ----------------------------<  116<H>  3.7   |  27  |  1.02    Ca    7.5<L>      12 Sep 2023 04:51  Phos  1.6     09-12  Mg     2.2     09-12    TPro  5.1<L>  /  Alb  2.1<L>  /  TBili  0.9  /  DBili  x   /  AST  37  /  ALT  19  /  AlkPhos  51  09-12      Urine Microscopic-Add On (NC) (09.11.23 @ 15:00)   Squamous Epithelial Cells: Present  Bacteria: Few /HPF  Granular Cast: Present: 2-5/lpf  Hyaline Casts: Present: 0-2/lpf  White Blood Cell - Urine: 5 /HPF  Red Blood Cell - Urine: >50 /HPFUrinalysis (09.11.23 @ 15:00)   pH Urine: 6.5  Glucose Qualitative, Urine: Negative mg/dL  Blood, Urine: Large  Color: Dark Yellow  Urine Appearance: Clear  Bilirubin: Negative  Ketone - Urine: Trace mg/dL  Specific Gravity: 1.030  Protein, Urine: 300 mg/dL  Urobilinogen: 1.0 mg/dL  Nitrite: Negative  Leukocyte Esterase Concentration: Trace      LIVER FUNCTIONS - ( 12 Sep 2023 04:51 )  Alb: 2.1 g/dL / Pro: 5.1 g/dL / ALK PHOS: 51 U/L / ALT: 19 U/L DA / AST: 37 U/L / GGT: x             PT/INR - ( 11 Sep 2023 12:45 )   PT: 14.6 sec;   INR: 1.29 ratio         PTT - ( 11 Sep 2023 12:45 )  PTT:32.3 sec    LACTATE:Lactate, Blood: 1.0 mmol/L (09-11 @ 22:30)      ABG - ABG - ( 11 Sep 2023 16:32 )  pH, Arterial: 7.39  pH, Blood: x     /  pCO2: 42    /  pO2: 35    / HCO3: 25    / Base Excess: 0.3   /  SaO2: 56                  CULTURES:   Abdominal Fl Abdominal Fluid  09-11 @ 22:00 --  --  --          RADIOLOGY:      ROS  [  ] UNABLE TO ELICIT               HPI:  Patient is a 73y old  Female with hx of COPD (active smoker, not on home oxygen), peripheral neuropathy presents as per ED documentation with abdominal pain. Patient unable to provide history, and history is provided by Patient sister (Vlad, [phone]) and patient's  Leroy ([phone]). Patient's sister states that on Saturday night patient started to complain of abdominal pain, and had 1 episode of NBNB emesis. Denies any changes to bowel habits, hematochezia. Patient's sister denies fevers or chills or prior episodes. At baseline patient is fully functional, denies use of ambulatory devices.         History as above, asked to see this patient  who presented to the hospital with abdominal pain and vomiting and was found to have acute Appendicitis with perforation and Generalized Peritonitis. She was taken to the OR and had an Appendectomy with washout and is currently in the ICU, she is sedated but responds to pain. She is unable to answer any questions. She is not on any pressors and is currently on Zosyn for her infection. She is not febrile and her Leukocytosis has normalized.            PAST MEDICAL & SURGICAL HISTORY:  HTN (hypertension)      HLD (hyperlipidemia)      H/O neuropathy      History of COPD          sulfa drugs (Unknown)      Meds:  chlorhexidine 2% Cloths 1 Application(s) Topical <User Schedule>  dexMEDEtomidine Infusion 0.3 MICROgram(s)/kG/Hr IV Continuous <Continuous>  HYDROmorphone  Injectable 1 milliGRAM(s) IV Push every 6 hours  lactated ringers. 1000 milliLiter(s) IV Continuous <Continuous>  pantoprazole  Injectable 40 milliGRAM(s) IV Push daily  piperacillin/tazobactam IVPB.. 3.375 Gram(s) IV Intermittent every 8 hours      SOCIAL HISTORY:  Smoker:  YES   ETOH use:  unknown      FAMILY HISTORY:  FH: HTN (hypertension)    Family history of diabetes mellitus (DM)        VITALS:  Vital Signs Last 24 Hrs  T(C): 36.4 (12 Sep 2023 16:11), Max: 36.6 (12 Sep 2023 04:48)  T(F): 97.6 (12 Sep 2023 16:11), Max: 97.9 (12 Sep 2023 04:48)  HR: 93 (12 Sep 2023 17:00) (57 - 113)  BP: 113/54 (12 Sep 2023 17:00) (71/49 - 146/82)  BP(mean): 72 (12 Sep 2023 17:00) (50 - 103)  RR: 25 (12 Sep 2023 17:00) (10 - 25)  SpO2: 93% (12 Sep 2023 17:00) (89% - 98%)    Parameters below as of 11 Sep 2023 18:30  Patient On (Oxygen Delivery Method): nasal cannula  O2 Flow (L/min): 2      LABS/DIAGNOSTIC TESTS:                          10.7   5.41  )-----------( 143      ( 12 Sep 2023 04:51 )             32.4     WBC Count: 5.41 K/uL (09-12 @ 04:51)  WBC Count: 9.02 K/uL (09-11 @ 22:00)  WBC Count: 13.55 K/uL (09-11 @ 12:45)      09-12    140  |  112<H>  |  19<H>  ----------------------------<  116<H>  3.7   |  27  |  1.02    Ca    7.5<L>      12 Sep 2023 04:51  Phos  1.6     09-12  Mg     2.2     09-12    TPro  5.1<L>  /  Alb  2.1<L>  /  TBili  0.9  /  DBili  x   /  AST  37  /  ALT  19  /  AlkPhos  51  09-12      Urine Microscopic-Add On (NC) (09.11.23 @ 15:00)   Squamous Epithelial Cells: Present  Bacteria: Few /HPF  Granular Cast: Present: 2-5/lpf  Hyaline Casts: Present: 0-2/lpf  White Blood Cell - Urine: 5 /HPF  Red Blood Cell - Urine: >50 /HPFUrinalysis (09.11.23 @ 15:00)   pH Urine: 6.5  Glucose Qualitative, Urine: Negative mg/dL  Blood, Urine: Large  Color: Dark Yellow  Urine Appearance: Clear  Bilirubin: Negative  Ketone - Urine: Trace mg/dL  Specific Gravity: 1.030  Protein, Urine: 300 mg/dL  Urobilinogen: 1.0 mg/dL  Nitrite: Negative  Leukocyte Esterase Concentration: Trace      LIVER FUNCTIONS - ( 12 Sep 2023 04:51 )  Alb: 2.1 g/dL / Pro: 5.1 g/dL / ALK PHOS: 51 U/L / ALT: 19 U/L DA / AST: 37 U/L / GGT: x             PT/INR - ( 11 Sep 2023 12:45 )   PT: 14.6 sec;   INR: 1.29 ratio         PTT - ( 11 Sep 2023 12:45 )  PTT:32.3 sec    LACTATE:Lactate, Blood: 1.0 mmol/L (09-11 @ 22:30)      ABG - ABG - ( 11 Sep 2023 16:32 )  pH, Arterial: 7.39  pH, Blood: x     /  pCO2: 42    /  pO2: 35    / HCO3: 25    / Base Excess: 0.3   /  SaO2: 56                  CULTURES:   Abdominal Fl Abdominal Fluid  09-11 @ 22:00 --  --  --          RADIOLOGY:< from: CT Abdomen and Pelvis w/ IV Cont (09.11.23 @ 14:26) >  ACC: [id number] EXAM:  CT ABDOMEN AND PELVIS IC   ORDERED BY: ASHLEY HATHAWAY     *** ADDENDUM # 1 ***    Upon further review, there is a mild hiatal hernia.    Dr. Hathaway was informed about the liver dome lesion which may represent a   nonspecificmass lesion, abscess or a hemangioma.    --- End of Report ---    *** END OF ADDENDUM # 1 ***      PROCEDURE DATE:  09/11/2023          INTERPRETATION:  CLINICAL INFORMATION: Fever. Sepsis. Altered mental   status. Abdominal tenderness.    COMPARISON: None.    CONTRAST/COMPLICATIONS:  IV Contrast: Omnipaque 350  90 cc administered   10 cc discarded  Oral Contrast: NONE  Complications: None reported at time of study completion    PROCEDURE:  CT of the Abdomen and Pelvis was performed.  Sagittal and coronal reformats were performed.    FINDINGS: The examination is limited by respiratory motion artifact.  LOWER CHEST: Small bilateral atelectasis.    LIVER: 3.4 cm hypodense lesion with rim enhancement in the liver dome   (image 20 series 2); detail of the enhancement cannot be characterized   accurately due to respiratory motion artifact. This may represent a   nonspecific mass lesion, abscess, or hemangioma. In hepatic segment 4,   there is a 3.4 cm hypodense lesion with peripheral nodular enhancement;   this may represent a hemangioma. If clinically indicated, abdominal MR   without and with IV contrast may be pursued for further evaluation.  BILE DUCTS: Normal caliber.  GALLBLADDER: Focal 6 mm enhancing lesion at the gallbladder fundalwall;   attention should be directed to this on the abdominal MR  SPLEEN: Within normal limits.  PANCREAS: Within normal limits.  ADRENALS: Within normal limits.  KIDNEYS/URETERS: Within normal limits.    BLADDER: Within normal limits.  REPRODUCTIVE ORGANS: Calcified uterine fibroids. 5.2 cm soft tissue   density structure in the right adnexa may represent an exophytic uterine   fibroid, or a right ovarian mass. If clinically indicated, pelvic   ultrasound may be pursued for further evaluation.    BOWEL: No bowel obstruction. Appendix dilated measuring up to 11 mm in   caliber with adjacent stranding and extraluminal air. Small free air is   also noted elsewhere in the peritoneum. Findings are compatible with   perforated acute appendicitis. 1.0 cm appendicolith at the orifice of the   appendix.  PERITONEUM: Trace right ascites. No drainable abscess is identified.  VESSELS: Mild calcified atherosclerotic disease.  RETROPERITONEUM/LYMPH NODES: No lymphadenopathy.  ABDOMINAL WALL: Within normal limits.  BONES: Degenerative spondylosis. Grade 1 anterolisthesis at L4-5. Small   sclerotic lesion in the medullary space of the proximal right femur may   represent an enchondroma.    IMPRESSION: Appendix dilated measuring up to 11 mm in caliber with   adjacent stranding and extraluminal air. Small free air is also noted   elsewhere in the peritoneum. Findings are compatible with perforated   acute appendicitis. 1.0 cm appendicolith at the orifice of the appendix.   No drainable abscess is identified within the peritoneum.    Trace right ascites.    Lesions in the liver as described above. Focal 6 mm enhancing lesion at   the gallbladder fundal wall if clinically indicated, abdominal MR without   and with IV contrast may be pursued for further evaluation.    5.2 cm soft tissue density structure in the right adnexa may represent an   exophytic uterine fibroid, or a right ovarian mass. If clinically   indicated, pelvic ultrasound may be pursued for further evaluation.    Dr. Hathaway isinformed with read back.    --- End of Report ---    ***Please see the addendum at the top of this report. It may contain   additional important information or changes.****        NEELIMA FORD MD; Attending Radiologist  This document has been electronically signed. Sep 11 2023  3:20PM  1st Addendum: NEELIMA FORD MD; Attending Radiologist  The first addendum was electronically signed on: Sep 11 2023  3:37PM.    < end of copied text >        ROS  [ x ] UNABLE TO ELICIT

## 2023-09-12 NOTE — CONSULT NOTE ADULT - ASSESSMENT
perforated appendicitis with peritonitis  sepsis  right adnexal lesion  hepatic dome lesion  6mm enhancing gb lesion    REcommend ICU admission for optimization prior to OR  plan is for OR for diagnostic laparopscopy, abdominal washout, possible bowel resection tonight.  npo  iv abx  ivf, 2 liters given in ER,,additional 500cc bolus ordered.  icu team to plan further recussitation  type and screen
Sepsis  Acute Appendicitis with Perforation  Generalized Peritonitis  Leukocytosis - normalized      Plan - Cont Zosyn 3.375 gms iv q8hrs   awaiting peritoneal cultures

## 2023-09-12 NOTE — PROGRESS NOTE ADULT - SUBJECTIVE AND OBJECTIVE BOX
INTERVAL HPI/OVERNIGHT EVENTS: ***    PRESSORS: [ ] YES [ ] NO  WHICH:    Antimicrobial:  piperacillin/tazobactam IVPB.. 3.375 Gram(s) IV Intermittent every 8 hours    Cardiovascular:    Pulmonary:    Hematalogic:    Other:  chlorhexidine 2% Cloths 1 Application(s) Topical <User Schedule>  dexMEDEtomidine Infusion 0.3 MICROgram(s)/kG/Hr IV Continuous <Continuous>  lactated ringers. 1000 milliLiter(s) IV Continuous <Continuous>  morphine  - Injectable 2 milliGRAM(s) IV Push every 4 hours PRN  pantoprazole  Injectable 40 milliGRAM(s) IV Push daily    chlorhexidine 2% Cloths 1 Application(s) Topical <User Schedule>  dexMEDEtomidine Infusion 0.3 MICROgram(s)/kG/Hr IV Continuous <Continuous>  lactated ringers. 1000 milliLiter(s) IV Continuous <Continuous>  morphine  - Injectable 2 milliGRAM(s) IV Push every 4 hours PRN  pantoprazole  Injectable 40 milliGRAM(s) IV Push daily  piperacillin/tazobactam IVPB.. 3.375 Gram(s) IV Intermittent every 8 hours    Drug Dosing Weight    Weight (kg): 69.3 (11 Sep 2023 21:45)    CENTRAL LINE: [ ] YES [ ] NO  LOCATION:   DATE INSERTED:  REMOVE: [ ] YES [ ] NO  EXPLAIN:    LINARES: [ ] YES [ ] NO    DATE INSERTED:  REMOVE:  [ ] YES [ ] NO  EXPLAIN:    A-LINE:  [ ] YES [ ] NO  LOCATION:   DATE INSERTED:  REMOVE:  [ ] YES [ ] NO  EXPLAIN:    PMH -reviewed admission note, no change since admission  PAST MEDICAL & SURGICAL HISTORY:  HTN (hypertension)      HLD (hyperlipidemia)      H/O neuropathy      History of COPD          ICU Vital Signs Last 24 Hrs  T(C): 36.6 (12 Sep 2023 04:48), Max: 39 (11 Sep 2023 17:49)  T(F): 97.9 (12 Sep 2023 04:48), Max: 102.2 (11 Sep 2023 17:49)  HR: 57 (12 Sep 2023 07:00) (57 - 124)  BP: 73/39 (12 Sep 2023 07:00) (71/49 - 143/56)  BP(mean): 50 (12 Sep 2023 07:00) (50 - 106)  ABP: --  ABP(mean): --  RR: 11 (12 Sep 2023 07:00) (11 - 28)  SpO2: 96% (12 Sep 2023 07:00) (89% - 96%)    O2 Parameters below as of 11 Sep 2023 18:30  Patient On (Oxygen Delivery Method): nasal cannula  O2 Flow (L/min): 2          ABG - ( 11 Sep 2023 16:32 )  pH, Arterial: 7.39  pH, Blood: x     /  pCO2: 42    /  pO2: 35    / HCO3: 25    / Base Excess: 0.3   /  SaO2: 56                    09-11 @ 07:01  -  09-12 @ 07:00  --------------------------------------------------------  IN: 522.5 mL / OUT: 1420 mL / NET: -897.5 mL            PHYSICAL EXAM:    GENERAL: NAD, well-groomed, well-developed  HEAD:  Atraumatic, Normocephalic  EYES: EOMI, PERRLA, conjunctiva and sclera clear  ENMT: No tonsillar erythema, exudates, or enlargement; Moist mucous membranes, Good dentition, No lesions  NECK: Supple, normal appearance, No JVD; Normal thyroid; Trachea midline  NERVOUS SYSTEM:  Alert & Oriented X3,  Motor Strength 5/5 B/L upper and lower extremities; DTRs 2+ intact and symmetric  CHEST/LUNG: No chest deformity; Normal percussion bilaterally; No rales, rhonchi, wheezing   HEART: Regular rate and rhythm; No murmurs, rubs, or gallops  ABDOMEN: Soft, Nontender, Nondistended; Bowel sounds present  EXTREMITIES:  2+ Peripheral Pulses, No clubbing, cyanosis, or edema  LYMPH: No lymphadenopathy noted  SKIN: No rashes or lesions;  Good capillary refill      LABS:  CBC Full  -  ( 12 Sep 2023 04:51 )  WBC Count : 5.41 K/uL  RBC Count : 3.67 M/uL  Hemoglobin : 10.7 g/dL  Hematocrit : 32.4 %  Platelet Count - Automated : 143 K/uL  Mean Cell Volume : 88.3 fl  Mean Cell Hemoglobin : 29.2 pg  Mean Cell Hemoglobin Concentration : 33.0 gm/dL  Auto Neutrophil # : x  Auto Lymphocyte # : x  Auto Monocyte # : x  Auto Eosinophil # : x  Auto Basophil # : x  Auto Neutrophil % : x  Auto Lymphocyte % : x  Auto Monocyte % : x  Auto Eosinophil % : x  Auto Basophil % : x    09-12    140  |  112<H>  |  19<H>  ----------------------------<  116<H>  3.7   |  27  |  1.02    Ca    7.5<L>      12 Sep 2023 04:51  Phos  1.6     09-12  Mg     2.2     09-12    TPro  5.1<L>  /  Alb  2.1<L>  /  TBili  0.9  /  DBili  x   /  AST  37  /  ALT  19  /  AlkPhos  51  09-12    PT/INR - ( 11 Sep 2023 12:45 )   PT: 14.6 sec;   INR: 1.29 ratio         PTT - ( 11 Sep 2023 12:45 )  PTT:32.3 sec  Urinalysis Basic - ( 12 Sep 2023 04:51 )    Color: x / Appearance: x / SG: x / pH: x  Gluc: 116 mg/dL / Ketone: x  / Bili: x / Urobili: x   Blood: x / Protein: x / Nitrite: x   Leuk Esterase: x / RBC: x / WBC x   Sq Epi: x / Non Sq Epi: x / Bacteria: x          RADIOLOGY & ADDITIONAL STUDIES REVIEWED:  ***    [ ]GOALS OF CARE DISCUSSION WITH PATIENT/FAMILY/PROXY:    CRITICAL CARE TIME SPENT: 35 minutes INTERVAL HPI/OVERNIGHT EVENTS: ***    PRESSORS: [ ] YES [ ] NO  WHICH:    Antimicrobial:  piperacillin/tazobactam IVPB.. 3.375 Gram(s) IV Intermittent every 8 hours    Cardiovascular:    Pulmonary:    Hematalogic:    Other:  chlorhexidine 2% Cloths 1 Application(s) Topical <User Schedule>  dexMEDEtomidine Infusion 0.3 MICROgram(s)/kG/Hr IV Continuous <Continuous>  lactated ringers. 1000 milliLiter(s) IV Continuous <Continuous>  morphine  - Injectable 2 milliGRAM(s) IV Push every 4 hours PRN  pantoprazole  Injectable 40 milliGRAM(s) IV Push daily    chlorhexidine 2% Cloths 1 Application(s) Topical <User Schedule>  dexMEDEtomidine Infusion 0.3 MICROgram(s)/kG/Hr IV Continuous <Continuous>  lactated ringers. 1000 milliLiter(s) IV Continuous <Continuous>  morphine  - Injectable 2 milliGRAM(s) IV Push every 4 hours PRN  pantoprazole  Injectable 40 milliGRAM(s) IV Push daily  piperacillin/tazobactam IVPB.. 3.375 Gram(s) IV Intermittent every 8 hours    Drug Dosing Weight    Weight (kg): 69.3 (11 Sep 2023 21:45)    CENTRAL LINE: [ ] YES [ ] NO  LOCATION:   DATE INSERTED:  REMOVE: [ ] YES [ ] NO  EXPLAIN:    LINARES: [ ] YES [ ] NO    DATE INSERTED:  REMOVE:  [ ] YES [ ] NO  EXPLAIN:    A-LINE:  [ ] YES [ ] NO  LOCATION:   DATE INSERTED:  REMOVE:  [ ] YES [ ] NO  EXPLAIN:    PMH -reviewed admission note, no change since admission  PAST MEDICAL & SURGICAL HISTORY:  HTN (hypertension)      HLD (hyperlipidemia)      H/O neuropathy      History of COPD          ICU Vital Signs Last 24 Hrs  T(C): 36.6 (12 Sep 2023 04:48), Max: 39 (11 Sep 2023 17:49)  T(F): 97.9 (12 Sep 2023 04:48), Max: 102.2 (11 Sep 2023 17:49)  HR: 57 (12 Sep 2023 07:00) (57 - 124)  BP: 73/39 (12 Sep 2023 07:00) (71/49 - 143/56)  BP(mean): 50 (12 Sep 2023 07:00) (50 - 106)  ABP: --  ABP(mean): --  RR: 11 (12 Sep 2023 07:00) (11 - 28)  SpO2: 96% (12 Sep 2023 07:00) (89% - 96%)    O2 Parameters below as of 11 Sep 2023 18:30  Patient On (Oxygen Delivery Method): nasal cannula  O2 Flow (L/min): 2          ABG - ( 11 Sep 2023 16:32 )  pH, Arterial: 7.39  pH, Blood: x     /  pCO2: 42    /  pO2: 35    / HCO3: 25    / Base Excess: 0.3   /  SaO2: 56                    09-11 @ 07:01  -  09-12 @ 07:00  --------------------------------------------------------  IN: 522.5 mL / OUT: 1420 mL / NET: -897.5 mL            PHYSICAL EXAM:    GENERAL: NAD, well-groomed, well-developed  HEAD:  Atraumatic, Normocephalic  EYES: EOMI, PERRLA, conjunctiva and sclera clear  ENMT: No tonsillar erythema, exudates, or enlargement; Moist mucous membranes, Good dentition, No lesions  NECK: Supple, normal appearance, No JVD; Normal thyroid; Trachea midline  NERVOUS SYSTEM:  + encephalopathy  CHEST/LUNG: No chest deformity; Normal percussion bilaterally; No rales, rhonchi, wheezing   HEART: Regular rate and rhythm; No murmurs, rubs, or gallops  ABDOMEN: Soft, Nontender, Nondistended; Bowel sounds present  EXTREMITIES:  2+ Peripheral Pulses, No clubbing, cyanosis, or edema  LYMPH: No lymphadenopathy noted  SKIN: No rashes or lesions;  Good capillary refill      LABS:  CBC Full  -  ( 12 Sep 2023 04:51 )  WBC Count : 5.41 K/uL  RBC Count : 3.67 M/uL  Hemoglobin : 10.7 g/dL  Hematocrit : 32.4 %  Platelet Count - Automated : 143 K/uL  Mean Cell Volume : 88.3 fl  Mean Cell Hemoglobin : 29.2 pg  Mean Cell Hemoglobin Concentration : 33.0 gm/dL  Auto Neutrophil # : x  Auto Lymphocyte # : x  Auto Monocyte # : x  Auto Eosinophil # : x  Auto Basophil # : x  Auto Neutrophil % : x  Auto Lymphocyte % : x  Auto Monocyte % : x  Auto Eosinophil % : x  Auto Basophil % : x    09-12    140  |  112<H>  |  19<H>  ----------------------------<  116<H>  3.7   |  27  |  1.02    Ca    7.5<L>      12 Sep 2023 04:51  Phos  1.6     09-12  Mg     2.2     09-12    TPro  5.1<L>  /  Alb  2.1<L>  /  TBili  0.9  /  DBili  x   /  AST  37  /  ALT  19  /  AlkPhos  51  09-12    PT/INR - ( 11 Sep 2023 12:45 )   PT: 14.6 sec;   INR: 1.29 ratio         PTT - ( 11 Sep 2023 12:45 )  PTT:32.3 sec  Urinalysis Basic - ( 12 Sep 2023 04:51 )    Color: x / Appearance: x / SG: x / pH: x  Gluc: 116 mg/dL / Ketone: x  / Bili: x / Urobili: x   Blood: x / Protein: x / Nitrite: x   Leuk Esterase: x / RBC: x / WBC x   Sq Epi: x / Non Sq Epi: x / Bacteria: x          RADIOLOGY & ADDITIONAL STUDIES REVIEWED:  ***    [ ]GOALS OF CARE DISCUSSION WITH PATIENT/FAMILY/PROXY:    CRITICAL CARE TIME SPENT: 35 minutes INTERVAL HPI/OVERNIGHT EVENTS:  Pt had lap appendectomy after ruptured appendicitis with feculent pus in the peritoneum. Patient examined at bedside this AM.  Patient appears confused and is yelling out "please help me." She has 2 drains in her abdomen with serous fluid.    PRESSORS: [ ] YES [X] NO    Antimicrobial:  piperacillin/tazobactam IVPB.. 3.375 Gram(s) IV Intermittent every 8 hours    Cardiovascular:    Pulmonary:    Hematalogic:    Other:  chlorhexidine 2% Cloths 1 Application(s) Topical <User Schedule>  dexMEDEtomidine Infusion 0.3 MICROgram(s)/kG/Hr IV Continuous <Continuous>  lactated ringers. 1000 milliLiter(s) IV Continuous <Continuous>  morphine  - Injectable 2 milliGRAM(s) IV Push every 4 hours PRN  pantoprazole  Injectable 40 milliGRAM(s) IV Push daily    chlorhexidine 2% Cloths 1 Application(s) Topical <User Schedule>  dexMEDEtomidine Infusion 0.3 MICROgram(s)/kG/Hr IV Continuous <Continuous>  lactated ringers. 1000 milliLiter(s) IV Continuous <Continuous>  morphine  - Injectable 2 milliGRAM(s) IV Push every 4 hours PRN  pantoprazole  Injectable 40 milliGRAM(s) IV Push daily  piperacillin/tazobactam IVPB.. 3.375 Gram(s) IV Intermittent every 8 hours    Drug Dosing Weight    Weight (kg): 69.3 (11 Sep 2023 21:45)    CENTRAL LINE: [ ] YES [X] NO  LOCATION:   DATE INSERTED:  REMOVE: [ ] YES [ ] NO  EXPLAIN:    LINARES: [X] YES [ ] NO    DATE INSERTED:  REMOVE:  [ ] YES [ ] NO  EXPLAIN:    A-LINE:  [ ] YES [X] NO  LOCATION:   DATE INSERTED:  REMOVE:  [ ] YES [ ] NO  EXPLAIN:    PMH -reviewed admission note, no change since admission  PAST MEDICAL & SURGICAL HISTORY:  HTN (hypertension)      HLD (hyperlipidemia)      H/O neuropathy      History of COPD          ICU Vital Signs Last 24 Hrs  T(C): 36.6 (12 Sep 2023 04:48), Max: 39 (11 Sep 2023 17:49)  T(F): 97.9 (12 Sep 2023 04:48), Max: 102.2 (11 Sep 2023 17:49)  HR: 57 (12 Sep 2023 07:00) (57 - 124)  BP: 73/39 (12 Sep 2023 07:00) (71/49 - 143/56)  BP(mean): 50 (12 Sep 2023 07:00) (50 - 106)  ABP: --  ABP(mean): --  RR: 11 (12 Sep 2023 07:00) (11 - 28)  SpO2: 96% (12 Sep 2023 07:00) (89% - 96%)    O2 Parameters below as of 11 Sep 2023 18:30  Patient On (Oxygen Delivery Method): nasal cannula  O2 Flow (L/min): 2          ABG - ( 11 Sep 2023 16:32 )  pH, Arterial: 7.39  pH, Blood: x     /  pCO2: 42    /  pO2: 35    / HCO3: 25    / Base Excess: 0.3   /  SaO2: 56                    09-11 @ 07:01  -  09-12 @ 07:00  --------------------------------------------------------  IN: 522.5 mL / OUT: 1420 mL / NET: -897.5 mL            PHYSICAL EXAM:    GENERAL: Pt in acute distress and confused, yelling "please help me", [place] with juanita urine  HEAD:  Atraumatic, Normocephalic  EYES: EOMI, PERRLA, conjunctiva and sclera clear  ENMT: Moist mucous membranes  NECK: Supple, normal appearance, No JVD; Normal thyroid; Trachea midline  NERVOUS SYSTEM:  +encephalopathy, A&Ox1, moving all extremities spontaneously  CHEST/LUNG: No chest deformity; CTA.  No rales, rhonchi, wheezing   HEART: Regular rate and rhythm; No murmurs, rubs, or gallops  ABDOMEN: Soft, Tender, 2 MARY LOU drains with serous fluid; Bowel sounds present  EXTREMITIES:  2+ Peripheral Pulses, No clubbing, cyanosis, or edema  SKIN: No rashes or lesions;  Good capillary refill      LABS:  CBC Full  -  ( 12 Sep 2023 04:51 )  WBC Count : 5.41 K/uL  RBC Count : 3.67 M/uL  Hemoglobin : 10.7 g/dL  Hematocrit : 32.4 %  Platelet Count - Automated : 143 K/uL  Mean Cell Volume : 88.3 fl  Mean Cell Hemoglobin : 29.2 pg  Mean Cell Hemoglobin Concentration : 33.0 gm/dL  Auto Neutrophil # : x  Auto Lymphocyte # : x  Auto Monocyte # : x  Auto Eosinophil # : x  Auto Basophil # : x  Auto Neutrophil % : x  Auto Lymphocyte % : x  Auto Monocyte % : x  Auto Eosinophil % : x  Auto Basophil % : x    09-12    140  |  112<H>  |  19<H>  ----------------------------<  116<H>  3.7   |  27  |  1.02    Ca    7.5<L>      12 Sep 2023 04:51  Phos  1.6     09-12  Mg     2.2     09-12    TPro  5.1<L>  /  Alb  2.1<L>  /  TBili  0.9  /  DBili  x   /  AST  37  /  ALT  19  /  AlkPhos  51  09-12    PT/INR - ( 11 Sep 2023 12:45 )   PT: 14.6 sec;   INR: 1.29 ratio         PTT - ( 11 Sep 2023 12:45 )  PTT:32.3 sec  Urinalysis Basic - ( 12 Sep 2023 04:51 )    Color: x / Appearance: x / SG: x / pH: x  Gluc: 116 mg/dL / Ketone: x  / Bili: x / Urobili: x   Blood: x / Protein: x / Nitrite: x   Leuk Esterase: x / RBC: x / WBC x   Sq Epi: x / Non Sq Epi: x / Bacteria: x          RADIOLOGY & ADDITIONAL STUDIES REVIEWED:  ***    [ ]GOALS OF CARE DISCUSSION WITH PATIENT/FAMILY/PROXY:    CRITICAL CARE TIME SPENT: 35 minutes

## 2023-09-12 NOTE — CONSULT NOTE ADULT - GASTROINTESTINAL
soft/nondistended/no guarding/no rigidity/no organomegaly/no masses palpable/tender/bowel sounds hypoactive

## 2023-09-12 NOTE — PROGRESS NOTE ADULT - SUBJECTIVE AND OBJECTIVE BOX
History of Present Illness:  Reason for Admission: Sepsis 2/2 Perforated Appendicitis  History of Present Illness:   Patient is a 73y old  Female with hx of COPD (active smoker, not on home oxygen), peripheral neuropathy presents as per ED documentation with abdominal pain. Patient unable to provide history, and history is provided by Patient sister (Vlad, [phone]) and patient's  Leroy ([phone]). Patient's sister states that on Saturday night patient started to complain of abdominal pain, and had 1 episode of NBNB emesis. Denies any changes to bowel habits, hematochezia. Patient's sister denies fevers or chills or prior episodes. At baseline patient is fully functional, denies use of ambulatory devices.     [facility] conciliation needs to be done.               Review of Systems:  Unable to obtain due to: Altered mentation  Review of Systems: Unable to obtain    Goals of Care:    GOALS OF CARE:  · Participants	Family  · Spouse	 Leroy Gonzalez [phone]     Conversation Discussion:  · Conversation	Diagnosis; Prognosis  · Conversation Details	Spoke to patient's HCP  Leroy and he states he would like CPR and all measures that can be taken to prolong his wife's lifespan. Patient is FULL CODE.      Allergies and Intolerances:        Allergies:  	sulfa drugs: Drug Category, Unknown    Home Medications:   * Outpatient Medication Status not yet specified    Patient History:    Past Medical, Past Surgical, and Family History:  PAST MEDICAL HISTORY:  H/O neuropathy     History of COPD     HLD (hyperlipidemia)     HTN (hypertension).     FAMILY HISTORY:  Family history of diabetes mellitus (DM)  FH: HTN (hypertension).     Social History:  · Substance use	No  · Social History (marital status, living situation, occupation, and sexual history)	 denies, ETOH, illicit drug use. Patient lives with . Active smoker     Tobacco Screening:  · Core Measure Site	Yes  · Has the patient used tobacco in the past 30 days?	Yes  · Tobacco Cessation Education/Counseling	Offered and patient declined  · Tobacco Cessation Medication	Offered and patient declined    Risk Assessment:    Present on Admission:  Deep Venous Thrombosis	no  Pulmonary Embolus	no     HIV Screening:  · In accordance with [place] State law, we offer every patient who comes to our ED an HIV test. Would you like to be tested today?	Previously Declined (within the last year)    Physical Exam:   Physical Exam: PHYSICAL EXAM:  GENERAL: Noted to be in moderate distress, saturating well on 2L NC   HEAD:  Atraumatic, Normocephalic  EYES: EOMI, perrla   NECK: Supple  CHEST/LUNG: Clear to auscultation bilaterally; No wheeze; No crackles; No accessory muscles used  HEART: Regular rate and rhythm; No murmurs; gallops or rubs   ABDOMEN: Diffuse tenderness noted, guarding noted   EXTREMITIES:  2+ Peripheral Pulses, No cyanosis or edema  PSYCH: AAOx1 (to name only)  NEUROLOGY: Unable to fully assess neurological status, patient only following simple commands   SKIN: No rashes or lesions      Laboratory:   Blood Bank:    11-Sep-2023 16:26, Antibody Screen Interpretation  Antibody Screen: NEG    11-Sep-2023 16:26, Type + Screen  ABO RH Interpretation: O NEG  Blood Gas:    11-Sep-2023 16:32, Blood Gas Profile - Arterial  pH, Arterial: 7.39, [7.35 - 7.45]  pCO2, Arterial:   42, [32 - 35 mmHg]  pO2, Arterial:   35, [83 - 108 mmHg], TYPE:(C=Critical, N=Notification, A=Abnormal) C  	TESTS: _PO2A 35  	DATE/TIME CALLED: _09/11/2023 16:42:10 EDT  	CALLED TO: _Dr. TESS Lucas  	READ BACK (2 Patient Identifiers)(Y/N): _Y  	READ BACK VALUES (Y/N): _Y  	CALLED BY: _RRT IGOR  HCO3, Arterial: 25, [21 - 28 mmol/L]  Base Excess, Arterial: 0.3, [-2.0 - 3.0 mmol/L]  Blood Gas Comments Arterial: drawn by RN  Oxygen Saturation, Arterial: 56, [ %]  General Chemistry:    11-Sep-2023 12:45, Comprehensive Metabolic Panel  Sodium: 139, [135 - 145 mmol/L]  Potassium:   3.3, [3.5 - 5.3 mmol/L]  Chloride: 105, [96 - 108 mmol/L]  Carbon Dioxide: 26, [22 - 31 mmol/L]  Anion Gap: 8, [5 - 17 mmol/L]  Blood Urea Nitrogen:   29, [7 - 18 mg/dL]  Creatinine:   1.41, [0.50 - 1.30 mg/dL]  Glucose:   126, [70 - 99 mg/dL]  Calcium: 9.5, [8.4 - 10.5 mg/dL]  Protein Total: 7.5, [6.0 - 8.3 g/dL]  Albumin:   3.3, [3.5 - 5.0 g/dL]  Bilirubin Total:   2.6, [0.2 - 1.2 mg/dL]  Alkaline Phosphatase: 71, [40 - 120 U/L]  Aspartate Aminotransferase (AST/SGOT): 33, [10 - 40 U/L]  Alanine Aminotransferase (ALT/SGPT): 22, [10 - 60 U/L DA]  eGFR:   39, [>=60 mL/min/1.73m2], The estimated glomerular filtration rate (eGFR) is calculated using the  	2021 CKD-EPI creatinine equation, which does not have a coefficient for  	race and is validated in individuals 18 years of age and older (N Engl J  	Med 2021; 385:6351-1657). Creatinine-based eGFR may be inaccurate in  	various situations including but not limited to extremes of muscle mass,  	altered dietary protein intake, or medications that affect renal tubular  	creatinine secretion.    11-Sep-2023 12:45, Lactate, Blood  Lactate, Blood:   3.5, [0.7 - 2.0 mmol/L], TYPE:(C=Critical, N=Notification, A=Abnormal) N  	TESTS: LACTATE  	DATE/TIME CALLED: 09/11/2023 13:32:58 (ON HOLD), 13:45  	CALLED TO: DR. ISABELA FRANCO  	READ BACK (2 Patient Identifiers)(Y/N): Y  	READ BACK VALUES (Y/N): Y  	CALLED BY: ELSI RUSSO 09/11/202313:46:18 EDT  	***Floor notified as per CMS Sepsis/Septic Shock protocol***  	Elevated lactate. Consider ordering follow-up lactate to trend.    11-Sep-2023 16:08, Lactate, Blood  Lactate, Blood:   4.1, [0.7 - 2.0 mmol/L], TYPE:(C=Critical, N=Notification, A=Abnormal) c  	TESTS: _Latacte  	DATE/TIME CALLED: _09/11/2023 16:40:59 EDT; 09/11/2023 16:46:21 EDT  	CALLED TO: BHAKTI ZAFAR BACK (2 Patient Identifiers)(Y/N): _y  	READ BACK VALUES (Y/N): _y  	CALLED BY: Julianao09/11/2023 16:47:42 EDT  Coagulation:    11-Sep-2023 12:45, Activated Partial Thromboplastin Time  Activated Partial Thromboplastin Time: 32.3, [24.5 - 35.6 sec], Effective July 25, 2023, the reference range has changed.  	The recommended therapeutic heparin range (full dose) is 58-99 seconds.  	Argatroban range is 1.5 to 3.0 times of the baseline APTT value, not to  	exceed 100 seconds.  	Routine coagulation results should be interpreted with caution when  	taking Factor Xa inhibitors or direct thrombin inhibitors; blood sampling  	prior to drug intake is recommended.    11-Sep-2023 12:45, Prothrombin Time and INR, Plasma  Prothrombin Time, Plasma:   14.6, [9.5 - 13.0 sec], Effective July 25, 2023, the reference range has changed.  INR:   1.29, [0.85 - 1.18 ratio], Recommended targets/ranges for therapeutic INR:  	2.0-3.0 Deep vein thrombosis, pulmonary embolism, atrial fibrillation  	2.0-3.0 Mechanical aortic valve, antiphospholipid syndrome with previous  	arterial or venous thromboembolism  	2.5-3.5 Mechanical mitral valve, double mechanical valve (aortic and  	mitral positions, high risk valves)  	Note: Chest 2012 Feb;141(2 Suppl):7S-47S  	Routine coagulation results should be interpreted with caution when  	taking Factor Xa inhibitors or direct thrombin inhibitors; blood sampling  	prior to drug intake is recommended.  Hematology:    11-Sep-2023 12:45, Complete Blood Count + Automated Diff  WBC Count:   13.55, [3.80 - 10.50 K/uL]  RBC Count: 4.72, [3.80 - 5.20 M/uL]  Hemoglobin: 13.8, [11.5 - 15.5 g/dL]  Hematocrit: 41.6, [34.5 - 45.0 %]  Mean Cell Volume: 88.1, [80.0 - 100.0 fl]  Mean Cell Hemoglobin: 29.2, [27.0 - 34.0 pg]  Mean Cell Hemoglobin Conc: 33.2, [32.0 - 36.0 gm/dL]  Red Cell Distrib Width: 13.9, [10.3 - 14.5 %]  Platelet Count - Automated: 197, [150 - 400 K/uL]  Auto Neutrophil #:   11.02, [1.80 - 7.40 K/uL]  Auto Lymphocyte #: 1.53, [1.00 - 3.30 K/uL]  Auto Monocyte #: 0.86, [0.00 - 0.90 K/uL]  Auto Eosinophil #: 0.08, [0.00 - 0.50 K/uL]  Auto Basophil #: 0.03, [0.00 - 0.20 K/uL]  Auto Neutrophil %:   81.4, [43.0 - 77.0 %], Differential percentages must be correlated with absolute numbers for  	clinical significance.  Auto Lymphocyte %:   11.3, [13.0 - 44.0 %]  Auto Monocyte %: 6.3, [2.0 - 14.0 %]  Auto Eosinophil %: 0.6, [0.0 - 6.0 %]  Auto Basophil %: 0.2, [0.0 - 2.0 %]  Auto Immature Granulocyte %: 0.2, [0.0 - 0.9 %], (Includes meta, myelo and promyelocytes). Mild elevations in immature  	granulocytes may be seen with many inflammatory processes and pregnancy;  	clinical correlation suggested.  Nucleated RBC: 0, [0 - 0 /100 WBCs]  Urine:    11-Sep-2023 15:00, Urinalysis  Color: Dark Yellow, [Yellow]  Urine Appearance: Clear, [Clear]  Bilirubin: Negative, [Negative]  Ketone - Urine:   Trace, [Negative mg/dL]  Specific Gravity: 1.030, [1.005 - 1.030]  Protein, Urine:   300, [Negative mg/dL]  Urobilinogen: 1.0, [0.2 - 1.0 mg/dL]  Nitrite: Negative, [Negative]  Leukocyte Esterase Concentration:   Trace, [Negative]  pH Urine: 6.5, [5.0 - 8.0]  Blood, Urine:   Large, [Negative]  Glucose Qualitative, Urine: Negative, [Negative mg/dL]    11-Sep-2023 15:00, Urine Microscopic-Add On (NC)  White Blood Cell - Urine: 5, [0 - 5 /HPF]  Red Blood Cell - Urine:   >50, [0 - 4 /HPF]  Bacteria:   Few, [Negative /HPF]  Hyaline Casts:   Present, [None Seen], 0-2/lpf  Granular Cast:   Present, [None Seen], 2-5/lpf  Squamous Epithelial Cells:   Present, [None Seen]    Radiology:   X-Ray, Fluoroscopy:    11-Sep-2023 13:03, Xray Chest 1 View- PORTABLE-Urgent  Xray Chest 1 View- PORTABLE-Urgent:   	ACC: [id number] EXAM:  XR CHEST PORTABLE URGENT 1V   ORDERED BY: ASHLEY FRANCO   	  	PROCEDURE DATE:  09/11/2023    	  	  	  	INTERPRETATION:  EXAM:XR CHEST URGENT.  	  	 CLINICAL INDICATION: Sepsis .  	  	 TECHNIQUE: Portable semiupright AP view.  	  	 PRIOR EXAM: None.  	  	 FINDINGS:  	  	 Visualized lung fields are clear. Cardiac silhouette and pulmonary   	vasculature are within normal limits. There is no significant bony   	abnormality.  	  	  	IMPRESSION:  	  	No acute lung disease.  	  	--- End of Report ---  	  	  	  	  	  	SEAN CALZADA MD; Attending Radiologist  	This document has been electronically signed. Sep 11 2023  3:32PM  PACS Image: Image(s) Available    11-Sep-2023 14:26, CT Abdomen and Pelvis w/ IV Cont  PACS Image: Image(s) Available    Assessment and Plan:   · VTE Risk Assessment	VTE Assessment already completed for this visit  · Completed VTE Risk Assessment(s)	Refer to the Assessment tab to view/cancel completed assessment.     Assessment:  · Assessment	  Patient is a 72 yo female with hx of COPD, not on home oxygen, neuropathy presents with abdominal pain. Upon evaluation in ED, patient noted with temp of 101.8, BP of 127/66, saturating 91% on Room air with hr of 124bpm. Physical examination noted with diffuse tenderness in abdominal region with guarding. Labs significant for leukocytosis of 13, lactate of 3.5 and scr of 1.41. CT abdomen and pelvis noted for free air in peritoneum and findings for perforated acute appendicitis with fidings of 6mm gall bladder lesion and right adnexal lesion. CXR negative. Surgery consulted and patient is scheduled for urgent ex lap, abdominal washout and possible bowel resection. ICU consulted for sepsis 2/2 perforated appendicitis. Patient admitted to ICU for sepsis 2/2 acute appendicitis.     #Acute Encephalopathy   #Sepsis  #Acute perforated appendicitis  #COPD  #Neuropathy  # Elevated lactate  # Gall bladder wall lesion   # MAYRA  #RIght Adnexal Lesion      Neurology  #Acute encephalopathy   - patient noted at baseline to be Alert and oriented x3  - currently Alert and oriented x1  - unable to complete full neurological exam   - can be 2/2 Sepsis induced vs possible CVA   - unable to get CT head due to receiving IV contrast  - admit to ICU   -continue with zosyn   - trend lactate  - will give 2L LR   - f/u urine and blood cultures   - Surgery reccs appreciated     Cardiovascular  - No acute issues   - please confirm home medications     Pulmonology  # Hx of COPD   - patient with hx of COPD   - not on home oxygen   - confirm home medications     GI  # Sepsis 2/2 Perforated acute appendicitis   - noted with findings of acute perforated appendicitis on CT scan  - hemodynamically stable, not requiring pressors   - NPO  - IVF hydration   - continue with zosyn   - Plan is for diagnostic laparoscopy with washout and possible bowel resection  - trend lactate  - f/u blood and urine cultures  - f/u post operative labs   - ID consulted Dr. Moffett     #Gallbladder wall lesion  - patient on CT noted with Focal 6 mm enhancing lesion at the gallbladder fundal wall  - Consider MR abdomen when clinically stable      Renal  #MAYRA  - patient noted to have Scr of 1.41  - avoid nephrotoxic agents  - f/u Scr levels     Infectious Disease  # Sepsis 2/2 Perforated acute appendicitis   - noted with findings of acute perforated appendicitis on CT scan  - hemodynamically stable, not requiring pressors   - NPO  - IVF hydration   - continue with zosyn   - Plan is for diagnostic laparoscopy with washout and possible bowel resection  - trend lactate  - f/u blood and urine cultures  - f/u post operative labs   - ID consulted Dr. Moffett     Heme  - No acute issues     Endo  #Neuropathy   - confirm home medications     Skin  - No acute issues     Reproductive   #Right Adnexal lesion  - CT abdomen showing 5.2 cm soft tissue density structure in the right adnexa may represent an exophytic uterine fibroid, or a right ovarian mass  - f/u pelvic US when stable     Code Status: Full Code

## 2023-09-12 NOTE — CHART NOTE - NSCHARTNOTEFT_GEN_A_CORE
Pt's sister was updated over the phone Pt's sister was updated over the phone on the patient's diagnosis, current clinical status of patient. She stated that patient is AAOx3 at baseline and the AMS started shortly prior to hospital admission. Discussed that infection, surgery and ICU stays will all contribute to patient's mental status kat in elderly patients and likely that once the infections resolves and patient transferred to the regular floor when more stable, the mental status would improve.

## 2023-09-12 NOTE — PROGRESS NOTE ADULT - ASSESSMENT
76-year-old-female presented with sepsis due to perforated appendicitis, s/p lap appendectomy and washout and 2 MARY LOU drain placement. Transferred back to SICU for resuscitation and monitoring.  BP stable, still tachycardic, lactate normal, WBC trending down.     Continue SICU care  Replace lytes PRN  Strict I/O, UOP > 0.5cc/kg/h, IVFm + PRN  NPO  MARY LOU care  IS

## 2023-09-12 NOTE — PROGRESS NOTE ADULT - ASSESSMENT
Patient is a 74 yo female with hx of COPD, not on home oxygen, neuropathy presents with abdominal pain. Upon evaluation in ED, patient noted with temp of 101.8, BP of 127/66, saturating 91% on Room air with hr of 124bpm. Physical examination noted with diffuse tenderness in abdominal region with guarding. Labs significant for leukocytosis of 13, lactate of 3.5 and scr of 1.41. CT abdomen and pelvis noted for free air in peritoneum and findings for perforated acute appendicitis with fidings of 6mm gall bladder lesion and right adnexal lesion. CXR negative. Surgery consulted and patient is scheduled for urgent ex lap, abdominal washout and possible bowel resection. ICU consulted for sepsis 2/2 perforated appendicitis. Patient admitted to ICU for sepsis 2/2 acute appendicitis.     #Acute Encephalopathy   #Sepsis  #Acute perforated appendicitis  #COPD  #Neuropathy  # Elevated lactate  # Gall bladder wall lesion   # MAYRA  #RIght Adnexal Lesion      Neurology  #Acute encephalopathy   - patient noted at baseline to be Alert and oriented x3  - currently Alert and oriented x1  - unable to complete full neurological exam   - can be 2/2 Sepsis induced vs possible CVA   - unable to get CT head due to receiving IV contrast  - admit to ICU   -continue with zosyn   - trend lactate  - will give 2L LR   - f/u urine and blood cultures   - Surgery reccs appreciated     Cardiovascular  - No acute issues   - please confirm home medications     Pulmonology  # Hx of COPD   - patient with hx of COPD   - not on home oxygen   - confirm home medications     GI  # Sepsis 2/2 Perforated acute appendicitis   - noted with findings of acute perforated appendicitis on CT scan  - hemodynamically stable, not requiring pressors   - NPO  - IVF hydration   - continue with zosyn   - Plan is for diagnostic laparoscopy with washout and possible bowel resection  - trend lactate  - f/u blood and urine cultures  - f/u post operative labs   - ID consulted Dr. Moffett     #Gallbladder wall lesion  - patient on CT noted with Focal 6 mm enhancing lesion at the gallbladder fundal wall  - Consider MR abdomen when clinically stable      Renal  #MAYRA  - patient noted to have Scr of 1.41  - avoid nephrotoxic agents  - f/u Scr levels     Infectious Disease  # Sepsis 2/2 Perforated acute appendicitis   - noted with findings of acute perforated appendicitis on CT scan  - hemodynamically stable, not requiring pressors   - NPO  - IVF hydration   - continue with zosyn   - Plan is for diagnostic laparoscopy with washout and possible bowel resection  - trend lactate  - f/u blood and urine cultures  - f/u post operative labs   - ID consulted Dr. Moffett     Heme  - No acute issues     Endo  #Neuropathy   - confirm home medications     Skin  - No acute issues     Reproductive   #Right Adnexal lesion  - CT abdomen showing 5.2 cm soft tissue density structure in the right adnexa may represent an exophytic uterine fibroid, or a right ovarian mass  - f/u pelvic US when stable     Code Status: Full Code    Disposition: Patient accepted and transferred to ICU  Patient is a 74 yo female with hx of COPD, not on home oxygen, neuropathy presents with abdominal pain. Upon evaluation in ED, patient noted with temp of 101.8, BP of 127/66, saturating 91% on Room air with hr of 124bpm. Physical examination noted with diffuse tenderness in abdominal region with guarding. Labs significant for leukocytosis of 13, lactate of 3.5 and scr of 1.41. CT abdomen and pelvis noted for free air in peritoneum and findings for perforated acute appendicitis with fidings of 6mm gall bladder lesion and right adnexal lesion. CXR negative. Surgery consulted and patient is scheduled for urgent ex lap, abdominal washout and possible bowel resection. ICU consulted for sepsis 2/2 perforated appendicitis. Patient admitted to ICU for sepsis 2/2 acute appendicitis.     #Acute Encephalopathy   #Sepsis  #Acute perforated appendicitis  #COPD  #Neuropathy  # Elevated lactate  # Gall bladder wall lesion   # MAYRA  #RIght Adnexal Lesion      Neurology  #Acute encephalopathy   - patient noted at baseline to be Alert and oriented x3  - currently Alert and oriented x1  - unable to complete full neurological exam   - can be 2/2 Sepsis induced vs possible CVA   - unable to get CT head due to receiving IV contrast  - admit to ICU   - continue with zosyn   - trend lactate  - will give 2L LR   - f/u urine and blood cultures  - Precedex drip at 0.3  - cont LR    Cardiovascular  - No acute issues   - please confirm home medications     Pulmonology  # Hx of COPD   - patient with hx of COPD   - not on home oxygen   - confirm home medications     GI  # Sepsis 2/2 Perforated acute appendicitis   - noted with findings of acute perforated appendicitis on CT scan  - peritoneum full of feculent pus according to surgeon, Dr. Sunshine  - hemodynamically stable, not requiring pressors   - NPO  - IVF hydration   - continue with zosyn   - Plan is for diagnostic laparoscopy with washout and possible bowel resection  - trend lactate  - f/u blood and urine cultures  - f/u post operative labs   - ID consulted Dr. Moffett   - Pain control with 1 Dilaudid q6    #Gallbladder wall lesion  - patient on CT noted with Focal 6 mm enhancing lesion at the gallbladder fundal wall  - Consider MR abdomen when clinically stable      Renal  #MAYRA  - patient noted to have Scr of 1.41  - avoid nephrotoxic agents  - Cr trended down to 1.02    Infectious Disease  # Sepsis 2/2 Perforated acute appendicitis   - noted with findings of acute perforated appendicitis on CT scan  - hemodynamically stable, not requiring pressors   - NPO  - IVF hydration   - continue with zosyn   - Plan is for diagnostic laparoscopy with washout and possible bowel resection  - trend lactate  - f/u blood and urine cultures  - f/u post operative labs   - ID consulted Dr. Moffett     Heme  - No acute issues     Endo  #Neuropathy   - confirm home medications     Skin  - No acute issues     Reproductive   #Right Adnexal lesion  - CT abdomen showing 5.2 cm soft tissue density structure in the right adnexa may represent an exophytic uterine fibroid, or a right ovarian mass  - f/u pelvic US when stable     Code Status: Full Code    Disposition: Patient accepted and transferred to ICU

## 2023-09-12 NOTE — PROGRESS NOTE ADULT - SUBJECTIVE AND OBJECTIVE BOX
Continue confused, sleeping, pain controlled. Voiding with baker    T(C): 36.4 (09-12-23 @ 00:00), Max: 39 (09-11-23 @ 17:49)  HR: 106 (09-12-23 @ 03:00) (76 - 124)  BP: 117/67 (09-12-23 @ 03:00) (81/39 - 143/56)  RR: 23 (09-12-23 @ 03:00) (11 - 28)  SpO2: 92% (09-12-23 @ 03:00) (89% - 96%)    I&O's Detail    11 Sep 2023 07:01  -  12 Sep 2023 03:13  --------------------------------------------------------  IN:    IV PiggyBack: 300 mL    IV PiggyBack: 50 mL    IV PiggyBack: 100 mL  Total IN: 450 mL    OUT:    Bulb (mL): 80 mL    Bulb (mL): 60 mL    Indwelling Catheter - Urethral (mL): 650 mL  Total OUT: 790 mL    Total NET: -340 mL    CONSTITUTIONAL: Confused, somnolent, no following commands    RESP: No respiratory distress, no use of accessory muscles  CV: tachycardic  GI: Soft, NT, ND, no rebound, no guarding; incisions C/D/I, drains in place SS output.                           11.0   9.02  )-----------( 165      ( 11 Sep 2023 22:00 )             33.4   09-11    139  |  110<H>  |  20<H>  ----------------------------<  129<H>  3.4<L>   |  25  |  1.13    Ca    7.4<L>      11 Sep 2023 22:00  Phos  2.3     09-11  Mg     1.6     09-11    TPro  5.4<L>  /  Alb  2.2<L>  /  TBili  1.3<H>  /  DBili  x   /  AST  33  /  ALT  17  /  AlkPhos  51  09-11    Lactate 1 (4.1)

## 2023-09-13 LAB
-  BACTEROIDES FRAGILIS: SIGNIFICANT CHANGE UP
ALBUMIN SERPL ELPH-MCNC: 1.9 G/DL — LOW (ref 3.5–5)
ALP SERPL-CCNC: 50 U/L — SIGNIFICANT CHANGE UP (ref 40–120)
ALT FLD-CCNC: 21 U/L DA — SIGNIFICANT CHANGE UP (ref 10–60)
ANION GAP SERPL CALC-SCNC: 4 MMOL/L — LOW (ref 5–17)
AST SERPL-CCNC: 35 U/L — SIGNIFICANT CHANGE UP (ref 10–40)
BASE EXCESS BLDA CALC-SCNC: -2 MMOL/L — SIGNIFICANT CHANGE UP (ref -2–3)
BASOPHILS # BLD AUTO: 0.02 K/UL — SIGNIFICANT CHANGE UP (ref 0–0.2)
BASOPHILS NFR BLD AUTO: 0.3 % — SIGNIFICANT CHANGE UP (ref 0–2)
BILIRUB SERPL-MCNC: 0.6 MG/DL — SIGNIFICANT CHANGE UP (ref 0.2–1.2)
BLOOD GAS COMMENTS ARTERIAL: SIGNIFICANT CHANGE UP
BUN SERPL-MCNC: 18 MG/DL — SIGNIFICANT CHANGE UP (ref 7–18)
CALCIUM SERPL-MCNC: 7.9 MG/DL — LOW (ref 8.4–10.5)
CHLORIDE SERPL-SCNC: 110 MMOL/L — HIGH (ref 96–108)
CO2 SERPL-SCNC: 26 MMOL/L — SIGNIFICANT CHANGE UP (ref 22–31)
CREAT SERPL-MCNC: 0.68 MG/DL — SIGNIFICANT CHANGE UP (ref 0.5–1.3)
CULTURE RESULTS: SIGNIFICANT CHANGE UP
EGFR: 91 ML/MIN/1.73M2 — SIGNIFICANT CHANGE UP
EOSINOPHIL # BLD AUTO: 0.03 K/UL — SIGNIFICANT CHANGE UP (ref 0–0.5)
EOSINOPHIL NFR BLD AUTO: 0.4 % — SIGNIFICANT CHANGE UP (ref 0–6)
GLUCOSE BLDC GLUCOMTR-MCNC: 76 MG/DL — SIGNIFICANT CHANGE UP (ref 70–99)
GLUCOSE SERPL-MCNC: 79 MG/DL — SIGNIFICANT CHANGE UP (ref 70–99)
HCO3 BLDA-SCNC: 23 MMOL/L — SIGNIFICANT CHANGE UP (ref 21–28)
HCT VFR BLD CALC: 31.8 % — LOW (ref 34.5–45)
HGB BLD-MCNC: 10.3 G/DL — LOW (ref 11.5–15.5)
HOROWITZ INDEX BLDA+IHG-RTO: 35 — SIGNIFICANT CHANGE UP
IMM GRANULOCYTES NFR BLD AUTO: 0.4 % — SIGNIFICANT CHANGE UP (ref 0–0.9)
LYMPHOCYTES # BLD AUTO: 1.38 K/UL — SIGNIFICANT CHANGE UP (ref 1–3.3)
LYMPHOCYTES # BLD AUTO: 18.4 % — SIGNIFICANT CHANGE UP (ref 13–44)
MAGNESIUM SERPL-MCNC: 2.3 MG/DL — SIGNIFICANT CHANGE UP (ref 1.6–2.6)
MCHC RBC-ENTMCNC: 28.8 PG — SIGNIFICANT CHANGE UP (ref 27–34)
MCHC RBC-ENTMCNC: 32.4 GM/DL — SIGNIFICANT CHANGE UP (ref 32–36)
MCV RBC AUTO: 88.8 FL — SIGNIFICANT CHANGE UP (ref 80–100)
METHOD TYPE: SIGNIFICANT CHANGE UP
MONOCYTES # BLD AUTO: 0.53 K/UL — SIGNIFICANT CHANGE UP (ref 0–0.9)
MONOCYTES NFR BLD AUTO: 7 % — SIGNIFICANT CHANGE UP (ref 2–14)
MRSA PCR RESULT.: SIGNIFICANT CHANGE UP
NEUTROPHILS # BLD AUTO: 5.53 K/UL — SIGNIFICANT CHANGE UP (ref 1.8–7.4)
NEUTROPHILS NFR BLD AUTO: 73.5 % — SIGNIFICANT CHANGE UP (ref 43–77)
NRBC # BLD: 0 /100 WBCS — SIGNIFICANT CHANGE UP (ref 0–0)
ORGANISM # SPEC MICROSCOPIC CNT: SIGNIFICANT CHANGE UP
ORGANISM # SPEC MICROSCOPIC CNT: SIGNIFICANT CHANGE UP
PCO2 BLDA: 40 MMHG — HIGH (ref 32–35)
PH BLDA: 7.37 — SIGNIFICANT CHANGE UP (ref 7.35–7.45)
PHOSPHATE SERPL-MCNC: 2.4 MG/DL — LOW (ref 2.5–4.5)
PLATELET # BLD AUTO: 159 K/UL — SIGNIFICANT CHANGE UP (ref 150–400)
PO2 BLDA: 66 MMHG — LOW (ref 83–108)
POTASSIUM SERPL-MCNC: 4.2 MMOL/L — SIGNIFICANT CHANGE UP (ref 3.5–5.3)
POTASSIUM SERPL-SCNC: 4.2 MMOL/L — SIGNIFICANT CHANGE UP (ref 3.5–5.3)
PROT SERPL-MCNC: 5.4 G/DL — LOW (ref 6–8.3)
RBC # BLD: 3.58 M/UL — LOW (ref 3.8–5.2)
RBC # FLD: 14 % — SIGNIFICANT CHANGE UP (ref 10.3–14.5)
S AUREUS DNA NOSE QL NAA+PROBE: SIGNIFICANT CHANGE UP
SAO2 % BLDA: 96 % — SIGNIFICANT CHANGE UP
SODIUM SERPL-SCNC: 140 MMOL/L — SIGNIFICANT CHANGE UP (ref 135–145)
SPECIMEN SOURCE: SIGNIFICANT CHANGE UP
WBC # BLD: 7.52 K/UL — SIGNIFICANT CHANGE UP (ref 3.8–10.5)
WBC # FLD AUTO: 7.52 K/UL — SIGNIFICANT CHANGE UP (ref 3.8–10.5)

## 2023-09-13 PROCEDURE — 93971 EXTREMITY STUDY: CPT | Mod: 26,RT

## 2023-09-13 PROCEDURE — 71045 X-RAY EXAM CHEST 1 VIEW: CPT | Mod: 26

## 2023-09-13 RX ORDER — HYDROMORPHONE HYDROCHLORIDE 2 MG/ML
1 INJECTION INTRAMUSCULAR; INTRAVENOUS; SUBCUTANEOUS ONCE
Refills: 0 | Status: DISCONTINUED | OUTPATIENT
Start: 2023-09-13 | End: 2023-09-13

## 2023-09-13 RX ORDER — DIPHENHYDRAMINE HCL 50 MG
25 CAPSULE ORAL ONCE
Refills: 0 | Status: COMPLETED | OUTPATIENT
Start: 2023-09-13 | End: 2023-09-13

## 2023-09-13 RX ORDER — MORPHINE SULFATE 50 MG/1
1 CAPSULE, EXTENDED RELEASE ORAL ONCE
Refills: 0 | Status: DISCONTINUED | OUTPATIENT
Start: 2023-09-13 | End: 2023-09-13

## 2023-09-13 RX ORDER — SODIUM CHLORIDE 9 MG/ML
1000 INJECTION, SOLUTION INTRAVENOUS
Refills: 0 | Status: DISCONTINUED | OUTPATIENT
Start: 2023-09-13 | End: 2023-09-14

## 2023-09-13 RX ORDER — HEPARIN SODIUM 5000 [USP'U]/ML
5000 INJECTION INTRAVENOUS; SUBCUTANEOUS EVERY 8 HOURS
Refills: 0 | Status: DISCONTINUED | OUTPATIENT
Start: 2023-09-13 | End: 2023-09-13

## 2023-09-13 RX ORDER — HEPARIN SODIUM 5000 [USP'U]/ML
5000 INJECTION INTRAVENOUS; SUBCUTANEOUS EVERY 8 HOURS
Refills: 0 | Status: DISCONTINUED | OUTPATIENT
Start: 2023-09-13 | End: 2023-09-19

## 2023-09-13 RX ORDER — SODIUM CHLORIDE 9 MG/ML
1000 INJECTION, SOLUTION INTRAVENOUS ONCE
Refills: 0 | Status: COMPLETED | OUTPATIENT
Start: 2023-09-13 | End: 2023-09-13

## 2023-09-13 RX ADMIN — HYDROMORPHONE HYDROCHLORIDE 1 MILLIGRAM(S): 2 INJECTION INTRAMUSCULAR; INTRAVENOUS; SUBCUTANEOUS at 07:00

## 2023-09-13 RX ADMIN — PIPERACILLIN AND TAZOBACTAM 25 GRAM(S): 4; .5 INJECTION, POWDER, LYOPHILIZED, FOR SOLUTION INTRAVENOUS at 13:09

## 2023-09-13 RX ADMIN — HEPARIN SODIUM 5000 UNIT(S): 5000 INJECTION INTRAVENOUS; SUBCUTANEOUS at 13:09

## 2023-09-13 RX ADMIN — HYDROMORPHONE HYDROCHLORIDE 1 MILLIGRAM(S): 2 INJECTION INTRAMUSCULAR; INTRAVENOUS; SUBCUTANEOUS at 18:20

## 2023-09-13 RX ADMIN — HEPARIN SODIUM 5000 UNIT(S): 5000 INJECTION INTRAVENOUS; SUBCUTANEOUS at 21:37

## 2023-09-13 RX ADMIN — SODIUM CHLORIDE 1000 MILLILITER(S): 9 INJECTION, SOLUTION INTRAVENOUS at 15:49

## 2023-09-13 RX ADMIN — Medication 25 MILLIGRAM(S): at 18:33

## 2023-09-13 RX ADMIN — CHLORHEXIDINE GLUCONATE 1 APPLICATION(S): 213 SOLUTION TOPICAL at 05:06

## 2023-09-13 RX ADMIN — PIPERACILLIN AND TAZOBACTAM 25 GRAM(S): 4; .5 INJECTION, POWDER, LYOPHILIZED, FOR SOLUTION INTRAVENOUS at 21:37

## 2023-09-13 RX ADMIN — MORPHINE SULFATE 1 MILLIGRAM(S): 50 CAPSULE, EXTENDED RELEASE ORAL at 15:48

## 2023-09-13 RX ADMIN — MORPHINE SULFATE 1 MILLIGRAM(S): 50 CAPSULE, EXTENDED RELEASE ORAL at 16:30

## 2023-09-13 RX ADMIN — MORPHINE SULFATE 1 MILLIGRAM(S): 50 CAPSULE, EXTENDED RELEASE ORAL at 13:30

## 2023-09-13 RX ADMIN — PIPERACILLIN AND TAZOBACTAM 25 GRAM(S): 4; .5 INJECTION, POWDER, LYOPHILIZED, FOR SOLUTION INTRAVENOUS at 06:36

## 2023-09-13 RX ADMIN — HYDROMORPHONE HYDROCHLORIDE 1 MILLIGRAM(S): 2 INJECTION INTRAMUSCULAR; INTRAVENOUS; SUBCUTANEOUS at 06:36

## 2023-09-13 RX ADMIN — PANTOPRAZOLE SODIUM 40 MILLIGRAM(S): 20 TABLET, DELAYED RELEASE ORAL at 13:09

## 2023-09-13 RX ADMIN — SODIUM CHLORIDE 80 MILLILITER(S): 9 INJECTION, SOLUTION INTRAVENOUS at 23:55

## 2023-09-13 RX ADMIN — HYDROMORPHONE HYDROCHLORIDE 1 MILLIGRAM(S): 2 INJECTION INTRAMUSCULAR; INTRAVENOUS; SUBCUTANEOUS at 17:23

## 2023-09-13 RX ADMIN — MORPHINE SULFATE 1 MILLIGRAM(S): 50 CAPSULE, EXTENDED RELEASE ORAL at 13:09

## 2023-09-13 NOTE — PHYSICAL THERAPY INITIAL EVALUATION ADULT - NAME OF CLINICIAN
"Subjective   28 y/o male with history of seizures (on Lamictal) arrives for evaluation of seizure, sore throat, cough, chills and headache. He tells me he has \"not been feeling well\" for the last week or so noting his son has the flu (tested positive). He tells me he is working as a  in a local school and has been tested multiple times always negative. He denies any fevers or loss of taste or smell. EMS was the one that found on the ground for his seizure stating he was post ictal. He arrives now alert and oriented. He tells me he has been doing all natural methods including CBD for his seizures and actually just started the Lamictal yesterday. He denies any weakness, numbness, tingling, loss of sensation, neck pain, cp, sob, flank or back pain any radiation/provoking/alleviating issues. He arrives in NAD.           Review of Systems   All other systems reviewed and are negative.      Past Medical History:   Diagnosis Date   • Seizures (CMS/HCC)    • Sleep apnea        Allergies   Allergen Reactions   • Levetiracetam Other (See Comments)     Has behavioral issues       Past Surgical History:   Procedure Laterality Date   • ADENOIDECTOMY     • TONSILLECTOMY         History reviewed. No pertinent family history.    Social History     Socioeconomic History   • Marital status: Single     Spouse name: Not on file   • Number of children: Not on file   • Years of education: Not on file   • Highest education level: Not on file   Tobacco Use   • Smoking status: Never Smoker   • Smokeless tobacco: Never Used   Vaping Use   • Vaping Use: Never used   Substance and Sexual Activity   • Alcohol use: No   • Drug use: Never   • Sexual activity: Defer           Objective   Physical Exam  Vitals and nursing note reviewed.   Constitutional:       Appearance: Normal appearance. He is normal weight.   HENT:      Head: Normocephalic and atraumatic.      Right Ear: External ear normal.      Left Ear: External ear normal.      Nose: " Nose normal.      Mouth/Throat:      Mouth: Mucous membranes are moist.      Pharynx: Oropharynx is clear.   Eyes:      Conjunctiva/sclera: Conjunctivae normal.      Pupils: Pupils are equal, round, and reactive to light.   Cardiovascular:      Rate and Rhythm: Regular rhythm. Tachycardia present.      Pulses: Normal pulses.      Heart sounds: Normal heart sounds.   Pulmonary:      Effort: Pulmonary effort is normal. No respiratory distress.      Breath sounds: Normal breath sounds. No stridor. No wheezing or rhonchi.   Abdominal:      General: Abdomen is flat. Bowel sounds are normal. There is no distension.      Palpations: There is no mass.      Tenderness: There is no abdominal tenderness.   Musculoskeletal:         General: No swelling or tenderness. Normal range of motion.      Cervical back: Normal range of motion and neck supple.   Skin:     General: Skin is warm.      Capillary Refill: Capillary refill takes less than 2 seconds.   Neurological:      General: No focal deficit present.      Mental Status: He is alert and oriented to person, place, and time. Mental status is at baseline.      Cranial Nerves: No cranial nerve deficit.      Sensory: No sensory deficit.      Motor: No weakness.   Psychiatric:         Mood and Affect: Mood normal.         Behavior: Behavior normal.         Thought Content: Thought content normal.         Procedures           ED Course    No seizures here, we did provide him with his lamictal here  ED Course as of Aug 16 2136   Mon Aug 16, 2021   2133 Explained to the patient that it does appear he has PNA. His labs do show a slight leukcoytosis with slight elevation in LFTs with normal respiratory studies. We will treat with ABX his PORT score is low cat and thus is okay for dc with strict return and follow up.     [JH]      ED Course User Index  [JH] Luke Snell MD            XR Chest 1 View   Final Result        Labs Reviewed   COMPREHENSIVE METABOLIC PANEL - Abnormal;  "Notable for the following components:       Result Value    ALT (SGPT) 56 (*)     AST (SGOT) 47 (*)     All other components within normal limits    Narrative:     GFR Normal >60  Chronic Kidney Disease <60  Kidney Failure <15     CK - Abnormal; Notable for the following components:    Creatine Kinase 214 (*)     All other components within normal limits   CBC WITH AUTO DIFFERENTIAL - Abnormal; Notable for the following components:    WBC 11.28 (*)     Neutrophils, Absolute 7.19 (*)     Immature Grans, Absolute 0.06 (*)     All other components within normal limits   COVID-19/FLUA&B/RSV, NP SWAB IN TRANSPORT MEDIA 8-12 HR TAT - Normal    Narrative:     Fact sheet for providers: https://www.fda.gov/media/994146/download    Fact sheet for patients: https://www.fda.gov/media/440077/download    Test performed by PCR.   PROTIME-INR - Normal   APTT - Normal   PROCALCITONIN - Normal    Narrative:     As a Marker for Sepsis (Non-Neonates):     1. <0.5 ng/mL represents a low risk of severe sepsis and/or septic shock.  2. >2 ng/mL represents a high risk of severe sepsis and/or septic shock.    As a Marker for Lower Respiratory Tract Infections that require antibiotic therapy:  PCT on Admission     Antibiotic Therapy             6-12 Hrs later  >0.5                          Strongly Recommended            >0.25 - <0.5             Recommended  0.1 - 0.25                  Discouraged                       Remeasure/reassess PCT  <0.1                         Strongly Discouraged         Remeasure/reassess PCT      As 28 day mortality risk marker: \"Change in Procalcitonin Result\" (>80% or <=80%) if Day 0 (or Day 1) and Day 4 values are available. Refer to http://www.[employer]-pct-calculator.com/    Change in PCT <=80 %   A decrease of PCT levels below or equal to 80% defines a positive change in PCT test result representing a higher risk for 28-day all-cause mortality of patients diagnosed with severe sepsis or septic " shock.    Change in PCT >80 %   A decrease of PCT levels of more than 80% defines a negative change in PCT result representing a lower risk for 28-day all-cause mortality of patients diagnosed with severe sepsis or septic shock.               COVID PRE-OP / PRE-PROCEDURE SCREENING ORDER (NO ISOLATION)    Narrative:     The following orders were created for panel order COVID PRE-OP / PRE-PROCEDURE SCREENING ORDER (NO ISOLATION) - Swab, Nasopharynx.  Procedure                               Abnormality         Status                     ---------                               -----------         ------                     COVID-19, FLU A/B, RSV P...[[id number]]  Normal              Final result                 Please view results for these tests on the individual orders.   BLOOD CULTURE   RAINBOW URINE   RAINBOW DRAW    Narrative:     The following orders were created for panel order [place] Draw.  Procedure                               Abnormality         Status                     ---------                               -----------         ------                     Green Top (Gel)[[id number]]                                  Final result               Lavender Top[[id number]]                                     Final result               Red Top[[id number]]                                          Final result               Delaney Top[[id number]]                                         In process                   Please view results for these tests on the individual orders.   GREEN TOP   LAVENDER TOP   RED TOP   CBC AND DIFFERENTIAL    Narrative:     The following orders were created for panel order CBC & Differential.  Procedure                               Abnormality         Status                     ---------                               -----------         ------                     CBC Auto Differential[[id number]]        Abnormal            Final result                 Please view results for these tests on the  individual orders.   GRAY TOP                                       MDM    Final diagnoses:   Pneumonia due to infectious organism, unspecified laterality, unspecified part of lung   Seizure (CMS/[facility])       ED Disposition  ED Disposition     ED Disposition Condition Comment    Discharge Stable           Guevara Morales MD  [address]  [facility] [id number] [phone]               Medication List      New Prescriptions    azithromycin 250 MG tablet  Commonly known as: Zithromax Z-Darnell  Take 2 tablets by mouth on day 1, then 1 tablet daily on days 2-5           Where to Get Your Medications      These medications were sent to [facility] [address] [place] [address] AT  60 - [phone]  - [phone] [address] [address]    Phone: [phone]   · azithromycin 250 MG tablet          Luke Snell MD  08/16/21 5135     Arielle SHIRLEY

## 2023-09-13 NOTE — PROGRESS NOTE ADULT - SUBJECTIVE AND OBJECTIVE BOX
Reason for Admission:  · Reason for Admission	Sepsis 2/2 Perforated Appendicitis      · Subjective and Objective:   INTERVAL HPI/OVERNIGHT EVENTS: ***    PRESSORS: [ ] YES [ ] NO  WHICH:    Antimicrobial:  piperacillin/tazobactam IVPB.. 3.375 Gram(s) IV Intermittent every 8 hours    Cardiovascular:    Pulmonary:    Hematalogic:    Other:  chlorhexidine 2% Cloths 1 Application(s) Topical <User Schedule>  dexMEDEtomidine Infusion 0.3 MICROgram(s)/kG/Hr IV Continuous <Continuous>  lactated ringers. 1000 milliLiter(s) IV Continuous <Continuous>  pantoprazole  Injectable 40 milliGRAM(s) IV Push daily    chlorhexidine 2% Cloths 1 Application(s) Topical <User Schedule>  dexMEDEtomidine Infusion 0.3 MICROgram(s)/kG/Hr IV Continuous <Continuous>  lactated ringers. 1000 milliLiter(s) IV Continuous <Continuous>  pantoprazole  Injectable 40 milliGRAM(s) IV Push daily  piperacillin/tazobactam IVPB.. 3.375 Gram(s) IV Intermittent every 8 hours    Drug Dosing Weight    Weight (kg): 69.3 (11 Sep 2023 21:45)    CENTRAL LINE: [ ] YES [ ] NO  LOCATION:   DATE INSERTED:  REMOVE: [ ] YES [ ] NO  EXPLAIN:    LINARES: [ ] YES [ ] NO    DATE INSERTED:  REMOVE:  [ ] YES [ ] NO  EXPLAIN:    A-LINE:  [ ] YES [ ] NO  LOCATION:   DATE INSERTED:  REMOVE:  [ ] YES [ ] NO  EXPLAIN:    PMH -reviewed admission note, no change since admission  PAST MEDICAL & SURGICAL HISTORY:  HTN (hypertension)      HLD (hyperlipidemia)      H/O neuropathy      History of COPD          ICU Vital Signs Last 24 Hrs  T(C): 35.9 (13 Sep 2023 04:00), Max: 36.6 (12 Sep 2023 20:16)  T(F): 96.7 (13 Sep 2023 04:00), Max: 97.8 (12 Sep 2023 20:16)  HR: 60 (13 Sep 2023 07:00) (55 - 98)  BP: 100/54 (13 Sep 2023 07:00) (92/51 - 146/82)  BP(mean): 69 (13 Sep 2023 07:00) (62 - 103)  ABP: --  ABP(mean): --  RR: 18 (13 Sep 2023 07:00) (10 - 25)  SpO2: 91% (13 Sep 2023 07:00) (88% - 98%)        ABG - ( 13 Sep 2023 03:49 )  pH, Arterial: 7.37  pH, Blood: x     /  pCO2: 40    /  pO2: 66    / HCO3: 23    / Base Excess: -2.0  /  SaO2: 96                    09-12 @ 07:01  -  09-13 @ 07:00  --------------------------------------------------------  IN: 2975 mL / OUT: 985 mL / NET: 1990 mL            PHYSICAL EXAM:    GENERAL: NAD, well-developed  HEAD:  Atraumatic, Normocephalic  EYES: EOMI, PERRLA, conjunctiva and sclera clear  ENMT: Moist mucous membranes  NECK: Supple, normal appearance, No JVD; Normal thyroid; Trachea midline  NERVOUS SYSTEM:  Alert & Oriented X3,  Motor Strength 5/5 B/L upper and lower extremities; DTRs 2+ intact and symmetric  CHEST/LUNG: No chest deformity; Clear to Auscultation; No rales, rhonchi, wheezing   HEART: Regular rate and rhythm; No murmurs, rubs, or gallops  ABDOMEN: Soft, Nontender, Nondistended; Bowel sounds present  EXTREMITIES:  2+ Peripheral Pulses, No clubbing, cyanosis, or edema  SKIN: No rashes or lesions;  Good capillary refill      LABS:  CBC Full  -  ( 13 Sep 2023 03:50 )  WBC Count : 7.52 K/uL  RBC Count : 3.58 M/uL  Hemoglobin : 10.3 g/dL  Hematocrit : 31.8 %  Platelet Count - Automated : 159 K/uL  Mean Cell Volume : 88.8 fl  Mean Cell Hemoglobin : 28.8 pg  Mean Cell Hemoglobin Concentration : 32.4 gm/dL  Auto Neutrophil # : 5.53 K/uL  Auto Lymphocyte # : 1.38 K/uL  Auto Monocyte # : 0.53 K/uL  Auto Eosinophil # : 0.03 K/uL  Auto Basophil # : 0.02 K/uL  Auto Neutrophil % : 73.5 %  Auto Lymphocyte % : 18.4 %  Auto Monocyte % : 7.0 %  Auto Eosinophil % : 0.4 %  Auto Basophil % : 0.3 %    09-13    140  |  110<H>  |  18  ----------------------------<  79  4.2   |  26  |  0.68    Ca    7.9<L>      13 Sep 2023 03:50  Phos  2.4     09-13  Mg     2.3     09-13    TPro  5.4<L>  /  Alb  1.9<L>  /  TBili  0.6  /  DBili  x   /  AST  35  /  ALT  21  /  AlkPhos  50  09-13    PT/INR - ( 11 Sep 2023 12:45 )   PT: 14.6 sec;   INR: 1.29 ratio         PTT - ( 11 Sep 2023 12:45 )  PTT:32.3 sec  Urinalysis Basic - ( 13 Sep 2023 03:50 )    Color: x / Appearance: x / SG: x / pH: x  Gluc: 79 mg/dL / Ketone: x  / Bili: x / Urobili: x   Blood: x / Protein: x / Nitrite: x   Leuk Esterase: x / RBC: x / WBC x   Sq Epi: x / Non Sq Epi: x / Bacteria: x      Culture Results:   No growth (09-11 @ 15:00)  Culture Results:   No growth at 24 hours (09-11 @ 12:50)  Culture Results:   Growth in anaerobic bottle: Gram Negative Rods  Direct identification is available within approximately 3-5  hours either by Blood Panel Multiplexed PCR or Direct  MALDI-TOF. Details: [email or web address] (09-11 @ 12:45)      RADIOLOGY & ADDITIONAL STUDIES REVIEWED:  ***    [ ]GOALS OF CARE DISCUSSION WITH PATIENT/FAMILY/PROXY:    CRITICAL CARE TIME SPENT: 35 minutes      Assessment and Plan:   · Assessment	  Patient is a 72 yo female with hx of COPD, not on home oxygen, neuropathy presents with abdominal pain. Upon evaluation in ED, patient noted with temp of 101.8, BP of 127/66, saturating 91% on Room air with hr of 124bpm. Physical examination noted with diffuse tenderness in abdominal region with guarding. Labs significant for leukocytosis of 13, lactate of 3.5 and scr of 1.41. CT abdomen and pelvis noted for free air in peritoneum and findings for perforated acute appendicitis with fidings of 6mm gall bladder lesion and right adnexal lesion. CXR negative. Surgery consulted and patient is scheduled for urgent ex lap, abdominal washout and possible bowel resection. ICU consulted for sepsis 2/2 perforated appendicitis. Patient admitted to ICU for sepsis 2/2 acute appendicitis.     #Acute Encephalopathy   #Sepsis  #Acute perforated appendicitis  #COPD  #Neuropathy  # Elevated lactate  # Gall bladder wall lesion   # MAYRA  #RIght Adnexal Lesion      Neurology  #Acute encephalopathy   - patient noted at baseline to be Alert and oriented x3  - currently Alert and oriented x1  - unable to complete full neurological exam   - can be 2/2 Sepsis induced vs possible CVA   - unable to get CT head due to receiving IV contrast  - admit to ICU   - continue with zosyn   - trend lactate  - will give 2L LR   - f/u urine and blood cultures  - Precedex drip at 0.3  - cont LR    Cardiovascular  - No acute issues   - please confirm home medications     Pulmonology  # Hx of COPD   - patient with hx of COPD   - not on home oxygen   - confirm home medications     GI  # Sepsis 2/2 Perforated acute appendicitis   - noted with findings of acute perforated appendicitis on CT scan  - peritoneum full of feculent pus according to surgeon, Dr. Sunshine  - hemodynamically stable, not requiring pressors   - NPO  - IVF hydration   - continue with zosyn   - Plan is for diagnostic laparoscopy with washout and possible bowel resection  - trend lactate  - f/u blood and urine cultures  - f/u post operative labs   - ID consulted Dr. Moffett   - Pain control with 1 Dilaudid q6    #Gallbladder wall lesion  - patient on CT noted with Focal 6 mm enhancing lesion at the gallbladder fundal wall  - Consider MR abdomen when clinically stable      Renal  #MAYRA  - patient noted to have Scr of 1.41  - avoid nephrotoxic agents  - Cr trended down to 1.02    Infectious Disease  # Sepsis 2/2 Perforated acute appendicitis   - noted with findings of acute perforated appendicitis on CT scan  - hemodynamically stable, not requiring pressors   - NPO  - IVF hydration   - continue with zosyn   - Plan is for diagnostic laparoscopy with washout and possible bowel resection  - trend lactate  - f/u blood and urine cultures  - f/u post operative labs   - ID consulted Dr. Moffett     Heme  - No acute issues     Endo  #Neuropathy   - confirm home medications     Skin  - No acute issues     Reproductive   #Right Adnexal lesion  - CT abdomen showing 5.2 cm soft tissue density structure in the right adnexa may represent an exophytic uterine fibroid, or a right ovarian mass  - f/u pelvic US when stable     Code Status: Full Code

## 2023-09-13 NOTE — PROGRESS NOTE ADULT - ASSESSMENT
Patient is a 74 yo female with hx of COPD, not on home oxygen, neuropathy presents with abdominal pain. Upon evaluation in ED, patient noted with temp of 101.8, BP of 127/66, saturating 91% on Room air with hr of 124bpm. Physical examination noted with diffuse tenderness in abdominal region with guarding. Labs significant for leukocytosis of 13, lactate of 3.5 and scr of 1.41. CT abdomen and pelvis noted for free air in peritoneum and findings for perforated acute appendicitis with fidings of 6mm gall bladder lesion and right adnexal lesion. CXR negative. Surgery consulted and patient is scheduled for urgent ex lap, abdominal washout and possible bowel resection. ICU consulted for sepsis 2/2 perforated appendicitis. Patient admitted to ICU for sepsis 2/2 acute appendicitis.     #Acute Encephalopathy   #Sepsis  #Acute perforated appendicitis  #COPD  #Neuropathy  # Elevated lactate  # Gall bladder wall lesion   # MAYRA  #RIght Adnexal Lesion      Neurology  #Acute encephalopathy   - patient noted at baseline to be Alert and oriented x3  - currently Alert and oriented x1  - unable to complete full neurological exam   - can be 2/2 Sepsis induced vs possible CVA   - unable to get CT head due to receiving IV contrast  - admit to ICU   - continue with zosyn   - trend lactate  - will give 2L LR   - f/u urine and blood cultures  - Precedex drip at 0.3  - cont LR    Cardiovascular  - No acute issues   - please confirm home medications     Pulmonology  # Hx of COPD   - patient with hx of COPD   - not on home oxygen   - confirm home medications     GI  # Sepsis 2/2 Perforated acute appendicitis   - noted with findings of acute perforated appendicitis on CT scan  - peritoneum full of feculent pus according to surgeon, Dr. Sunshine  - hemodynamically stable, not requiring pressors   - NPO  - IVF hydration   - continue with zosyn   - Plan is for diagnostic laparoscopy with washout and possible bowel resection  - trend lactate  - f/u blood and urine cultures  - f/u post operative labs   - ID consulted Dr. Moffett   - Pain control with 1 Dilaudid q6    #Gallbladder wall lesion  - patient on CT noted with Focal 6 mm enhancing lesion at the gallbladder fundal wall  - Consider MR abdomen when clinically stable      Renal  #MAYRA  - patient noted to have Scr of 1.41  - avoid nephrotoxic agents  - Cr trended down to 1.02    Infectious Disease  # Sepsis 2/2 Perforated acute appendicitis   - noted with findings of acute perforated appendicitis on CT scan  - hemodynamically stable, not requiring pressors   - NPO  - IVF hydration   - continue with zosyn   - Plan is for diagnostic laparoscopy with washout and possible bowel resection  - trend lactate  - f/u blood and urine cultures  - f/u post operative labs   - ID consulted Dr. Moffett     Heme  - No acute issues     Endo  #Neuropathy   - confirm home medications     Skin  - No acute issues     Reproductive   #Right Adnexal lesion  - CT abdomen showing 5.2 cm soft tissue density structure in the right adnexa may represent an exophytic uterine fibroid, or a right ovarian mass  - f/u pelvic US when stable     Code Status: Full Code    Disposition: Patient accepted and transferred to ICU  Patient is a 72 yo female with hx of COPD, not on home oxygen, neuropathy presents with abdominal pain. Upon evaluation in ED, patient noted with temp of 101.8, BP of 127/66, saturating 91% on Room air with hr of 124bpm. Physical examination noted with diffuse tenderness in abdominal region with guarding. Labs significant for leukocytosis of 13, lactate of 3.5 and scr of 1.41. CT abdomen and pelvis noted for free air in peritoneum and findings for perforated acute appendicitis with fidings of 6mm gall bladder lesion and right adnexal lesion. CXR negative. Surgery consulted and patient is scheduled for urgent ex lap, abdominal washout and possible bowel resection. ICU consulted for sepsis 2/2 perforated appendicitis. Patient admitted to ICU for sepsis 2/2 acute appendicitis.     #Acute Encephalopathy   #Sepsis  #Acute perforated appendicitis  #COPD  #Neuropathy  # Elevated lactate  # Gall bladder wall lesion   # MAYRA  #RIght Adnexal Lesion      ===========Neurology===========  #Acute encephalopathy   - patient noted at baseline to be Alert and oriented x3  - presented to ED Alert and oriented x1  - unable to complete full neurological exam   - can be 2/2 Sepsis induced vs possible CVA   - unable to get CT head due to receiving IV contrast  - admit to ICU   - continue with zosyn   - trend lactate  - will give 2L LR   - urine culture negative  - blood cx, 1 set pos for gram neg rods  - cont LR  - Improving to A&Ox2, following commands    ==========Cardiovascular=============  - No acute issues   - home meds include amlodipine and lisinopril, holding for now    ============Pulmonology===========  # Hx of COPD   - patient with hx of COPD   - not on home oxygen   - no home COPD meds  - on 4L [place]    =========GI=============  # Sepsis 2/2 Perforated acute appendicitis   - noted with findings of acute perforated appendicitis on CT scan  - Pt underwent laparoscopic appendectomy and washout  - peritoneum full of feculent pus according to surgeon, Dr. Sunshine  - hemodynamically stable, not requiring pressors   - NPO  - IVF hydration   - continue with zosyn   - trend lactate  - urine culture negative  - blood cx, 1 set pos for gram neg rods  - ID consulted Dr. Moffett   - Pain control with 1 Dilaudid q6    #Gallbladder wall lesion  - patient on CT noted with Focal 6 mm enhancing lesion at the gallbladder fundal wall  - Consider MR abdomen when clinically stable      ==========Renal==========  #MAYRA  - patient noted to have Scr of 1.41 on admission  - avoid nephrotoxic agents  - Cr trended down to 0.68    =========Infectious Disease===========  # Sepsis 2/2 Perforated acute appendicitis   - noted with findings of acute perforated appendicitis on CT scan  - hemodynamically stable, not requiring pressors   - NPO  - IVF hydration   - Plan is for diagnostic laparoscopy with washout and possible bowel resection  - trend lactate  - urine culture negative  - blood cx, 1 set pos for gram neg rods  - ID consulted Dr. Moffett   - continue with zosyn     =========Heme===========  - No acute issues   - heparin subq for DVT prophylaxis    ==========Endo==========  #Neuropathy   - home med pregabalin, held for now while pt NPO    ==========Skin===========  - No acute issues     =========Reproductive===========  #Right Adnexal lesion  - CT abdomen showing 5.2 cm soft tissue density structure in the right adnexa may represent an exophytic uterine fibroid, or a right ovarian mass  - f/u pelvic US when stable     ========Prophylaxis=======  - heparin subq    Code Status: Full Code    Disposition: Patient accepted and transferred to ICU

## 2023-09-13 NOTE — PROGRESS NOTE ADULT - ASSESSMENT
Sepsis  Acute Appendicitis with Perforation  Generalized Peritonitis  Leukocytosis - normalized  Bacteremia - with Bacteroides       Plan - Cont Zosyn 3.375 gms iv q8hrs   awaiting peritoneal cultures  Repeat blood cultures.  Time spent - 32 mins

## 2023-09-13 NOTE — PHYSICAL THERAPY INITIAL EVALUATION ADULT - PATIENT/FAMILY/SIGNIFICANT OTHER GOALS STATEMENT, PT EVAL
return home and be able to perform usual mobility and functional tasks independently and without difficulty, without an assistive device

## 2023-09-13 NOTE — PROGRESS NOTE ADULT - SUBJECTIVE AND OBJECTIVE BOX
No acute overnight, patient alert and oriented to person and place, less agitated/confused. Following commands. No pain, Voiding     ICU Vital Signs Last 24 Hrs  T(C): 35.9 (13 Sep 2023 04:00), Max: 36.6 (12 Sep 2023 20:16)  T(F): 96.7 (13 Sep 2023 04:00), Max: 97.8 (12 Sep 2023 20:16)  HR: 60 (13 Sep 2023 07:00) (55 - 98)  BP: 100/54 (13 Sep 2023 07:00) (92/51 - 146/82)  BP(mean): 69 (13 Sep 2023 07:00) (62 - 103)  RR: 18 (13 Sep 2023 07:00) (10 - 25)  SpO2: 91% (13 Sep 2023 07:00) (88% - 98%)    I&O's Detail    12 Sep 2023 07:01  -  13 Sep 2023 07:00  --------------------------------------------------------  IN:    IV PiggyBack: 100 mL    IV PiggyBack: 375 mL    Lactated Ringers: 600 mL    Lactated Ringers: 900 mL    Lactated Ringers Bolus: 1000 mL  Total IN: 2975 mL    OUT:    Bulb (mL): 170 mL    Bulb (mL): 150 mL    Indwelling Catheter - Urethral (mL): 665 mL  Total OUT: 985 mL    Total NET: 1990 mL    CONSTITUTIONAL: alert and oriented to person and place, following commands  RESP: No respiratory distress, no use of accessory muscles  CV: RRR  GI: Soft, NT, ND, no rebound, no guarding; incisions C/D/I, drains in place SS output.                           10.3   7.52  )-----------( 159      ( 13 Sep 2023 03:50 )             31.8   09-13    140  |  110<H>  |  18  ----------------------------<  79  4.2   |  26  |  0.68    Ca    7.9<L>      13 Sep 2023 03:50  Phos  2.4     09-13  Mg     2.3     09-13    TPro  5.4<L>  /  Alb  1.9<L>  /  TBili  0.6  /  DBili  x   /  AST  35  /  ALT  21  /  AlkPhos  50  09-13    -  Bacteroides fragilis: Detec (09.11.23 @ 12:45)    ABG - ( 13 Sep 2023 03:49 )    pH, Arterial: 7.37  pH, Blood: x     /  pCO2: 40    /  pO2: 66    / HCO3: 23    / Base Excess: -2.0  /  SaO2: 96

## 2023-09-13 NOTE — PHYSICAL THERAPY INITIAL EVALUATION ADULT - PERTINENT HX OF CURRENT PROBLEM, REHAB EVAL
sepsis due to perforated appendicitis; s/p laparoscopic appendectomy POD#2  h/o neuropathy, HLD, HTN

## 2023-09-13 NOTE — PHYSICAL THERAPY INITIAL EVALUATION ADULT - DIAGNOSIS, PT EVAL
sepsis secondary to perforated appendicitis; s/p lap. appendectomy POD#2; mild weakness and deconditioning; difficulty performing transfers and ambulation; unsteady gait and transfers

## 2023-09-13 NOTE — PHYSICAL THERAPY INITIAL EVALUATION ADULT - GENERAL OBSERVATIONS, REHAB EVAL
awake, alert, confused; + peripheral IV access on right arm; currently on O2@4L/min via NC; +abdominal wound dressing with MARY LOU Drain x 2 on lower umbilical area

## 2023-09-13 NOTE — PROGRESS NOTE ADULT - ASSESSMENT
76-year-old-female presented with sepsis/bacteremia due to perforated appendicitis, s/p lap appendectomy and washout and 2 MARY OLU drain placement. Transferred back to SICU for resuscitation and monitoring.   HDS, WBC trending down.     Continue SICU care  Replace lytes PRN  Strict I/O, UOP > 0.5cc/kg/h, IVFm + PRN  NPO  MARY LOU care  IS - Sat 89-93 COPD   ABX by ID (Bacteremia, peritonitis - perf appy, RUL PNA)

## 2023-09-13 NOTE — PROGRESS NOTE ADULT - SUBJECTIVE AND OBJECTIVE BOX
INTERVAL HPI/OVERNIGHT EVENTS: ***    PRESSORS: [ ] YES [ ] NO  WHICH:    Antimicrobial:  piperacillin/tazobactam IVPB.. 3.375 Gram(s) IV Intermittent every 8 hours    Cardiovascular:    Pulmonary:    Hematalogic:    Other:  chlorhexidine 2% Cloths 1 Application(s) Topical <User Schedule>  dexMEDEtomidine Infusion 0.3 MICROgram(s)/kG/Hr IV Continuous <Continuous>  lactated ringers. 1000 milliLiter(s) IV Continuous <Continuous>  pantoprazole  Injectable 40 milliGRAM(s) IV Push daily    chlorhexidine 2% Cloths 1 Application(s) Topical <User Schedule>  dexMEDEtomidine Infusion 0.3 MICROgram(s)/kG/Hr IV Continuous <Continuous>  lactated ringers. 1000 milliLiter(s) IV Continuous <Continuous>  pantoprazole  Injectable 40 milliGRAM(s) IV Push daily  piperacillin/tazobactam IVPB.. 3.375 Gram(s) IV Intermittent every 8 hours    Drug Dosing Weight    Weight (kg): 69.3 (11 Sep 2023 21:45)    CENTRAL LINE: [ ] YES [ ] NO  LOCATION:   DATE INSERTED:  REMOVE: [ ] YES [ ] NO  EXPLAIN:    LINARES: [ ] YES [ ] NO    DATE INSERTED:  REMOVE:  [ ] YES [ ] NO  EXPLAIN:    A-LINE:  [ ] YES [ ] NO  LOCATION:   DATE INSERTED:  REMOVE:  [ ] YES [ ] NO  EXPLAIN:    PMH -reviewed admission note, no change since admission  PAST MEDICAL & SURGICAL HISTORY:  HTN (hypertension)      HLD (hyperlipidemia)      H/O neuropathy      History of COPD          ICU Vital Signs Last 24 Hrs  T(C): 35.9 (13 Sep 2023 04:00), Max: 36.6 (12 Sep 2023 20:16)  T(F): 96.7 (13 Sep 2023 04:00), Max: 97.8 (12 Sep 2023 20:16)  HR: 60 (13 Sep 2023 07:00) (55 - 98)  BP: 100/54 (13 Sep 2023 07:00) (92/51 - 146/82)  BP(mean): 69 (13 Sep 2023 07:00) (62 - 103)  ABP: --  ABP(mean): --  RR: 18 (13 Sep 2023 07:00) (10 - 25)  SpO2: 91% (13 Sep 2023 07:00) (88% - 98%)        ABG - ( 13 Sep 2023 03:49 )  pH, Arterial: 7.37  pH, Blood: x     /  pCO2: 40    /  pO2: 66    / HCO3: 23    / Base Excess: -2.0  /  SaO2: 96                    09-12 @ 07:01  -  09-13 @ 07:00  --------------------------------------------------------  IN: 2975 mL / OUT: 985 mL / NET: 1990 mL            PHYSICAL EXAM:    GENERAL: NAD, well-developed  HEAD:  Atraumatic, Normocephalic  EYES: EOMI, PERRLA, conjunctiva and sclera clear  ENMT: Moist mucous membranes  NECK: Supple, normal appearance, No JVD; Normal thyroid; Trachea midline  NERVOUS SYSTEM:  Alert & Oriented X3,  Motor Strength 5/5 B/L upper and lower extremities; DTRs 2+ intact and symmetric  CHEST/LUNG: No chest deformity; Clear to Auscultation; No rales, rhonchi, wheezing   HEART: Regular rate and rhythm; No murmurs, rubs, or gallops  ABDOMEN: Soft, Nontender, Nondistended; Bowel sounds present  EXTREMITIES:  2+ Peripheral Pulses, No clubbing, cyanosis, or edema  SKIN: No rashes or lesions;  Good capillary refill      LABS:  CBC Full  -  ( 13 Sep 2023 03:50 )  WBC Count : 7.52 K/uL  RBC Count : 3.58 M/uL  Hemoglobin : 10.3 g/dL  Hematocrit : 31.8 %  Platelet Count - Automated : 159 K/uL  Mean Cell Volume : 88.8 fl  Mean Cell Hemoglobin : 28.8 pg  Mean Cell Hemoglobin Concentration : 32.4 gm/dL  Auto Neutrophil # : 5.53 K/uL  Auto Lymphocyte # : 1.38 K/uL  Auto Monocyte # : 0.53 K/uL  Auto Eosinophil # : 0.03 K/uL  Auto Basophil # : 0.02 K/uL  Auto Neutrophil % : 73.5 %  Auto Lymphocyte % : 18.4 %  Auto Monocyte % : 7.0 %  Auto Eosinophil % : 0.4 %  Auto Basophil % : 0.3 %    09-13    140  |  110<H>  |  18  ----------------------------<  79  4.2   |  26  |  0.68    Ca    7.9<L>      13 Sep 2023 03:50  Phos  2.4     09-13  Mg     2.3     09-13    TPro  5.4<L>  /  Alb  1.9<L>  /  TBili  0.6  /  DBili  x   /  AST  35  /  ALT  21  /  AlkPhos  50  09-13    PT/INR - ( 11 Sep 2023 12:45 )   PT: 14.6 sec;   INR: 1.29 ratio         PTT - ( 11 Sep 2023 12:45 )  PTT:32.3 sec  Urinalysis Basic - ( 13 Sep 2023 03:50 )    Color: x / Appearance: x / SG: x / pH: x  Gluc: 79 mg/dL / Ketone: x  / Bili: x / Urobili: x   Blood: x / Protein: x / Nitrite: x   Leuk Esterase: x / RBC: x / WBC x   Sq Epi: x / Non Sq Epi: x / Bacteria: x      Culture Results:   No growth (09-11 @ 15:00)  Culture Results:   No growth at 24 hours (09-11 @ 12:50)  Culture Results:   Growth in anaerobic bottle: Gram Negative Rods  Direct identification is available within approximately 3-5  hours either by Blood Panel Multiplexed PCR or Direct  MALDI-TOF. Details: [email or web address] (09-11 @ 12:45)      RADIOLOGY & ADDITIONAL STUDIES REVIEWED:  ***    [ ]GOALS OF CARE DISCUSSION WITH PATIENT/FAMILY/PROXY:    CRITICAL CARE TIME SPENT: 35 minutes INTERVAL HPI/OVERNIGHT EVENTS: ***    PRESSORS: [ ] YES [ ] NO  WHICH:    Antimicrobial:  piperacillin/tazobactam IVPB.. 3.375 Gram(s) IV Intermittent every 8 hours    Cardiovascular:    Pulmonary:    Hematalogic:    Other:  chlorhexidine 2% Cloths 1 Application(s) Topical <User Schedule>  dexMEDEtomidine Infusion 0.3 MICROgram(s)/kG/Hr IV Continuous <Continuous>  lactated ringers. 1000 milliLiter(s) IV Continuous <Continuous>  pantoprazole  Injectable 40 milliGRAM(s) IV Push daily    chlorhexidine 2% Cloths 1 Application(s) Topical <User Schedule>  dexMEDEtomidine Infusion 0.3 MICROgram(s)/kG/Hr IV Continuous <Continuous>  lactated ringers. 1000 milliLiter(s) IV Continuous <Continuous>  pantoprazole  Injectable 40 milliGRAM(s) IV Push daily  piperacillin/tazobactam IVPB.. 3.375 Gram(s) IV Intermittent every 8 hours    Drug Dosing Weight    Weight (kg): 69.3 (11 Sep 2023 21:45)    CENTRAL LINE: [ ] YES [ ] NO  LOCATION:   DATE INSERTED:  REMOVE: [ ] YES [ ] NO  EXPLAIN:    LINARES: [ ] YES [ ] NO    DATE INSERTED:  REMOVE:  [ ] YES [ ] NO  EXPLAIN:    A-LINE:  [ ] YES [ ] NO  LOCATION:   DATE INSERTED:  REMOVE:  [ ] YES [ ] NO  EXPLAIN:    PMH -reviewed admission note, no change since admission  PAST MEDICAL & SURGICAL HISTORY:  HTN (hypertension)      HLD (hyperlipidemia)      H/O neuropathy      History of COPD          ICU Vital Signs Last 24 Hrs  T(C): 35.9 (13 Sep 2023 04:00), Max: 36.6 (12 Sep 2023 20:16)  T(F): 96.7 (13 Sep 2023 04:00), Max: 97.8 (12 Sep 2023 20:16)  HR: 60 (13 Sep 2023 07:00) (55 - 98)  BP: 100/54 (13 Sep 2023 07:00) (92/51 - 146/82)  BP(mean): 69 (13 Sep 2023 07:00) (62 - 103)  ABP: --  ABP(mean): --  RR: 18 (13 Sep 2023 07:00) (10 - 25)  SpO2: 91% (13 Sep 2023 07:00) (88% - 98%)        ABG - ( 13 Sep 2023 03:49 )  pH, Arterial: 7.37  pH, Blood: x     /  pCO2: 40    /  pO2: 66    / HCO3: 23    / Base Excess: -2.0  /  SaO2: 96                    09-12 @ 07:01  -  09-13 @ 07:00  --------------------------------------------------------  IN: 2975 mL / OUT: 985 mL / NET: 1990 mL            PHYSICAL EXAM:    GENERAL: NAD, well-developed  HEAD:  Atraumatic, Normocephalic  EYES: EOMI, PERRLA, conjunctiva and sclera clear  ENMT: Moist mucous membranes  NECK: Supple, normal appearance, No JVD; Normal thyroid; Trachea midline  NERVOUS SYSTEM:  awake , improving  but still encephalopathic   CHEST/LUNG: No chest deformity; Clear to Auscultation; No rales, rhonchi, wheezing   HEART: Regular rate and rhythm; No murmurs, rubs, or gallops  ABDOMEN: Soft, Nontender, Nondistended; Bowel sounds present  EXTREMITIES:  2+ Peripheral Pulses, No clubbing, cyanosis, or edema  SKIN: No rashes or lesions;  Good capillary refill      LABS:  CBC Full  -  ( 13 Sep 2023 03:50 )  WBC Count : 7.52 K/uL  RBC Count : 3.58 M/uL  Hemoglobin : 10.3 g/dL  Hematocrit : 31.8 %  Platelet Count - Automated : 159 K/uL  Mean Cell Volume : 88.8 fl  Mean Cell Hemoglobin : 28.8 pg  Mean Cell Hemoglobin Concentration : 32.4 gm/dL  Auto Neutrophil # : 5.53 K/uL  Auto Lymphocyte # : 1.38 K/uL  Auto Monocyte # : 0.53 K/uL  Auto Eosinophil # : 0.03 K/uL  Auto Basophil # : 0.02 K/uL  Auto Neutrophil % : 73.5 %  Auto Lymphocyte % : 18.4 %  Auto Monocyte % : 7.0 %  Auto Eosinophil % : 0.4 %  Auto Basophil % : 0.3 %    09-13    140  |  110<H>  |  18  ----------------------------<  79  4.2   |  26  |  0.68    Ca    7.9<L>      13 Sep 2023 03:50  Phos  2.4     09-13  Mg     2.3     09-13    TPro  5.4<L>  /  Alb  1.9<L>  /  TBili  0.6  /  DBili  x   /  AST  35  /  ALT  21  /  AlkPhos  50  09-13    PT/INR - ( 11 Sep 2023 12:45 )   PT: 14.6 sec;   INR: 1.29 ratio         PTT - ( 11 Sep 2023 12:45 )  PTT:32.3 sec  Urinalysis Basic - ( 13 Sep 2023 03:50 )    Color: x / Appearance: x / SG: x / pH: x  Gluc: 79 mg/dL / Ketone: x  / Bili: x / Urobili: x   Blood: x / Protein: x / Nitrite: x   Leuk Esterase: x / RBC: x / WBC x   Sq Epi: x / Non Sq Epi: x / Bacteria: x      Culture Results:   No growth (09-11 @ 15:00)  Culture Results:   No growth at 24 hours (09-11 @ 12:50)  Culture Results:   Growth in anaerobic bottle: Gram Negative Rods  Direct identification is available within approximately 3-5  hours either by Blood Panel Multiplexed PCR or Direct  MALDI-TOF. Details: [email or web address] (09-11 @ 12:45)      RADIOLOGY & ADDITIONAL STUDIES REVIEWED:  ***    [ ]GOALS OF CARE DISCUSSION WITH PATIENT/FAMILY/PROXY:    CRITICAL CARE TIME SPENT: 35 minutes INTERVAL HPI/OVERNIGHT EVENTS:  No acute events overnight.  Patient examined at bedside this AM.  Patient appears more alert and oriented and is following commands today. Pt did pull out a line last night, so will have to insert another one today. Pt's pressure noted to be soft on the Precedex so will be weaned off as mental status improves.    PRESSORS: [ ] YES [X] NO    Antimicrobial:  piperacillin/tazobactam IVPB.. 3.375 Gram(s) IV Intermittent every 8 hours    Cardiovascular:    Pulmonary:    Hematalogic:    Other:  chlorhexidine 2% Cloths 1 Application(s) Topical <User Schedule>  dexMEDEtomidine Infusion 0.3 MICROgram(s)/kG/Hr IV Continuous <Continuous>  lactated ringers. 1000 milliLiter(s) IV Continuous <Continuous>  pantoprazole  Injectable 40 milliGRAM(s) IV Push daily    chlorhexidine 2% Cloths 1 Application(s) Topical <User Schedule>  dexMEDEtomidine Infusion 0.3 MICROgram(s)/kG/Hr IV Continuous <Continuous>  lactated ringers. 1000 milliLiter(s) IV Continuous <Continuous>  pantoprazole  Injectable 40 milliGRAM(s) IV Push daily  piperacillin/tazobactam IVPB.. 3.375 Gram(s) IV Intermittent every 8 hours    Drug Dosing Weight    Weight (kg): 69.3 (11 Sep 2023 21:45)    CENTRAL LINE: [ ] YES [X] NO  LOCATION:   DATE INSERTED:  REMOVE: [ ] YES [ ] NO  EXPLAIN:    LINARES: [X] YES [ ] NO    DATE INSERTED:  REMOVE:  [ ] YES [ ] NO  EXPLAIN:    A-LINE:  [ ] YES [X] NO  LOCATION:   DATE INSERTED:  REMOVE:  [ ] YES [ ] NO  EXPLAIN:    PMH -reviewed admission note, no change since admission  PAST MEDICAL & SURGICAL HISTORY:  HTN (hypertension)      HLD (hyperlipidemia)      H/O neuropathy      History of COPD          ICU Vital Signs Last 24 Hrs  T(C): 35.9 (13 Sep 2023 04:00), Max: 36.6 (12 Sep 2023 20:16)  T(F): 96.7 (13 Sep 2023 04:00), Max: 97.8 (12 Sep 2023 20:16)  HR: 60 (13 Sep 2023 07:00) (55 - 98)  BP: 100/54 (13 Sep 2023 07:00) (92/51 - 146/82)  BP(mean): 69 (13 Sep 2023 07:00) (62 - 103)  ABP: --  ABP(mean): --  RR: 18 (13 Sep 2023 07:00) (10 - 25)  SpO2: 91% (13 Sep 2023 07:00) (88% - 98%)        ABG - ( 13 Sep 2023 03:49 )  pH, Arterial: 7.37  pH, Blood: x     /  pCO2: 40    /  pO2: 66    / HCO3: 23    / Base Excess: -2.0  /  SaO2: 96                    09-12 @ 07:01  -  09-13 @ 07:00  --------------------------------------------------------  IN: 2975 mL / OUT: 985 mL / NET: 1990 mL            PHYSICAL EXAM:    GENERAL: Mildly distress, well-developed, following commands  HEAD:  Atraumatic, Normocephalic  EYES: EOMI, PERRLA, conjunctiva and sclera clear  ENMT: Moist mucous membranes  NECK: Supple, normal appearance, No JVD; Normal thyroid; Trachea midline  NERVOUS SYSTEM:  awake, improving  but still encephalopathic, A&Ox2  CHEST/LUNG: No chest deformity; Clear to Auscultation; No rales, rhonchi, wheezing   HEART: Regular rate and rhythm; No murmurs, rubs, or gallops  ABDOMEN: 2 MARY LOU drains with about 150 cc's each of serous fluid. Soft, Nontender, Nondistended; Bowel sounds hypoactive  EXTREMITIES:  2+ Peripheral Pulses, No clubbing, cyanosis, or edema  SKIN: No rashes or lesions;  Good capillary refill      LABS:  CBC Full  -  ( 13 Sep 2023 03:50 )  WBC Count : 7.52 K/uL  RBC Count : 3.58 M/uL  Hemoglobin : 10.3 g/dL  Hematocrit : 31.8 %  Platelet Count - Automated : 159 K/uL  Mean Cell Volume : 88.8 fl  Mean Cell Hemoglobin : 28.8 pg  Mean Cell Hemoglobin Concentration : 32.4 gm/dL  Auto Neutrophil # : 5.53 K/uL  Auto Lymphocyte # : 1.38 K/uL  Auto Monocyte # : 0.53 K/uL  Auto Eosinophil # : 0.03 K/uL  Auto Basophil # : 0.02 K/uL  Auto Neutrophil % : 73.5 %  Auto Lymphocyte % : 18.4 %  Auto Monocyte % : 7.0 %  Auto Eosinophil % : 0.4 %  Auto Basophil % : 0.3 %    09-13    140  |  110<H>  |  18  ----------------------------<  79  4.2   |  26  |  0.68    Ca    7.9<L>      13 Sep 2023 03:50  Phos  2.4     09-13  Mg     2.3     09-13    TPro  5.4<L>  /  Alb  1.9<L>  /  TBili  0.6  /  DBili  x   /  AST  35  /  ALT  21  /  AlkPhos  50  09-13    PT/INR - ( 11 Sep 2023 12:45 )   PT: 14.6 sec;   INR: 1.29 ratio         PTT - ( 11 Sep 2023 12:45 )  PTT:32.3 sec  Urinalysis Basic - ( 13 Sep 2023 03:50 )    Color: x / Appearance: x / SG: x / pH: x  Gluc: 79 mg/dL / Ketone: x  / Bili: x / Urobili: x   Blood: x / Protein: x / Nitrite: x   Leuk Esterase: x / RBC: x / WBC x   Sq Epi: x / Non Sq Epi: x / Bacteria: x      Culture Results:   No growth (09-11 @ 15:00)  Culture Results:   No growth at 24 hours (09-11 @ 12:50)  Culture Results:   Growth in anaerobic bottle: Gram Negative Rods  Direct identification is available within approximately 3-5  hours either by Blood Panel Multiplexed PCR or Direct  MALDI-TOF. Details: [email or web address] (09-11 @ 12:45)      RADIOLOGY & ADDITIONAL STUDIES REVIEWED:  ***    [ ]GOALS OF CARE DISCUSSION WITH PATIENT/FAMILY/PROXY:    CRITICAL CARE TIME SPENT: 35 minutes INTERVAL HPI/OVERNIGHT EVENTS:  No acute events overnight.  Patient examined at bedside this AM.  Patient appears more alert and oriented and is following commands today. Pt did pull out a line last night, so will have to insert another one today. Pt's pressure noted to be soft on the Precedex so will be weaned off as mental status improves.    PRESSORS: [ ] YES [X] NO    Antimicrobial:  piperacillin/tazobactam IVPB.. 3.375 Gram(s) IV Intermittent every 8 hours    Cardiovascular:    Pulmonary:    Hematalogic:    Other:  chlorhexidine 2% Cloths 1 Application(s) Topical <User Schedule>  dexMEDEtomidine Infusion 0.3 MICROgram(s)/kG/Hr IV Continuous <Continuous>  lactated ringers. 1000 milliLiter(s) IV Continuous <Continuous>  pantoprazole  Injectable 40 milliGRAM(s) IV Push daily    chlorhexidine 2% Cloths 1 Application(s) Topical <User Schedule>  dexMEDEtomidine Infusion 0.3 MICROgram(s)/kG/Hr IV Continuous <Continuous>  lactated ringers. 1000 milliLiter(s) IV Continuous <Continuous>  pantoprazole  Injectable 40 milliGRAM(s) IV Push daily  piperacillin/tazobactam IVPB.. 3.375 Gram(s) IV Intermittent every 8 hours    Drug Dosing Weight    Weight (kg): 69.3 (11 Sep 2023 21:45)    CENTRAL LINE: [ ] YES [X] NO  LOCATION:   DATE INSERTED:  REMOVE: [ ] YES [ ] NO  EXPLAIN:    LINARES: [X] YES [ ] NO    DATE INSERTED:  REMOVE:  [ ] YES [ ] NO  EXPLAIN:    A-LINE:  [ ] YES [X] NO  LOCATION:   DATE INSERTED:  REMOVE:  [ ] YES [ ] NO  EXPLAIN:    PMH -reviewed admission note, no change since admission  PAST MEDICAL & SURGICAL HISTORY:  HTN (hypertension)      HLD (hyperlipidemia)      H/O neuropathy      History of COPD          ICU Vital Signs Last 24 Hrs  T(C): 35.9 (13 Sep 2023 04:00), Max: 36.6 (12 Sep 2023 20:16)  T(F): 96.7 (13 Sep 2023 04:00), Max: 97.8 (12 Sep 2023 20:16)  HR: 60 (13 Sep 2023 07:00) (55 - 98)  BP: 100/54 (13 Sep 2023 07:00) (92/51 - 146/82)  BP(mean): 69 (13 Sep 2023 07:00) (62 - 103)  ABP: --  ABP(mean): --  RR: 18 (13 Sep 2023 07:00) (10 - 25)  SpO2: 91% (13 Sep 2023 07:00) (88% - 98%)        ABG - ( 13 Sep 2023 03:49 )  pH, Arterial: 7.37  pH, Blood: x     /  pCO2: 40    /  pO2: 66    / HCO3: 23    / Base Excess: -2.0  /  SaO2: 96                    09-12 @ 07:01  -  09-13 @ 07:00  --------------------------------------------------------  IN: 2975 mL / OUT: 985 mL / NET: 1990 mL            PHYSICAL EXAM:    GENERAL: Mildly distress, well-developed, following commands  HEAD:  Atraumatic, Normocephalic  EYES: EOMI, PERRLA, conjunctiva and sclera clear  ENMT: Moist mucous membranes  NECK: Supple, normal appearance, No JVD; Normal thyroid; Trachea midline  NERVOUS SYSTEM:  awake, improving  but still encephalopathic, A&Ox2  CHEST/LUNG: No chest deformity; Clear to Auscultation; No rales, rhonchi, wheezing   HEART: Regular rate and rhythm; No murmurs, rubs, or gallops  ABDOMEN: 2 MARY LOU drains with about 100 cc's each of serous fluid. Soft, tender, Nondistended; Bowel sounds hypoactive  EXTREMITIES:  2+ Peripheral Pulses, No clubbing, cyanosis, or edema  SKIN: No rashes or lesions;  Good capillary refill      LABS:  CBC Full  -  ( 13 Sep 2023 03:50 )  WBC Count : 7.52 K/uL  RBC Count : 3.58 M/uL  Hemoglobin : 10.3 g/dL  Hematocrit : 31.8 %  Platelet Count - Automated : 159 K/uL  Mean Cell Volume : 88.8 fl  Mean Cell Hemoglobin : 28.8 pg  Mean Cell Hemoglobin Concentration : 32.4 gm/dL  Auto Neutrophil # : 5.53 K/uL  Auto Lymphocyte # : 1.38 K/uL  Auto Monocyte # : 0.53 K/uL  Auto Eosinophil # : 0.03 K/uL  Auto Basophil # : 0.02 K/uL  Auto Neutrophil % : 73.5 %  Auto Lymphocyte % : 18.4 %  Auto Monocyte % : 7.0 %  Auto Eosinophil % : 0.4 %  Auto Basophil % : 0.3 %    09-13    140  |  110<H>  |  18  ----------------------------<  79  4.2   |  26  |  0.68    Ca    7.9<L>      13 Sep 2023 03:50  Phos  2.4     09-13  Mg     2.3     09-13    TPro  5.4<L>  /  Alb  1.9<L>  /  TBili  0.6  /  DBili  x   /  AST  35  /  ALT  21  /  AlkPhos  50  09-13    PT/INR - ( 11 Sep 2023 12:45 )   PT: 14.6 sec;   INR: 1.29 ratio         PTT - ( 11 Sep 2023 12:45 )  PTT:32.3 sec  Urinalysis Basic - ( 13 Sep 2023 03:50 )    Color: x / Appearance: x / SG: x / pH: x  Gluc: 79 mg/dL / Ketone: x  / Bili: x / Urobili: x   Blood: x / Protein: x / Nitrite: x   Leuk Esterase: x / RBC: x / WBC x   Sq Epi: x / Non Sq Epi: x / Bacteria: x      Culture Results:   No growth (09-11 @ 15:00)  Culture Results:   No growth at 24 hours (09-11 @ 12:50)  Culture Results:   Growth in anaerobic bottle: Gram Negative Rods  Direct identification is available within approximately 3-5  hours either by Blood Panel Multiplexed PCR or Direct  MALDI-TOF. Details: [email or web address] (09-11 @ 12:45)      RADIOLOGY & ADDITIONAL STUDIES REVIEWED:  ***    [ ]GOALS OF CARE DISCUSSION WITH PATIENT/FAMILY/PROXY:    CRITICAL CARE TIME SPENT: 35 minutes

## 2023-09-13 NOTE — PROGRESS NOTE ADULT - SUBJECTIVE AND OBJECTIVE BOX
ICU VISIT  73y Female    Meds:  piperacillin/tazobactam IVPB.. 3.375 Gram(s) IV Intermittent every 8 hours    Allergies    sulfa drugs (Unknown)    Intolerances        VITALS:  Vital Signs Last 24 Hrs  T(C): 36.3 (13 Sep 2023 16:00), Max: 36.6 (12 Sep 2023 20:16)  T(F): 97.3 (13 Sep 2023 16:00), Max: 97.8 (12 Sep 2023 20:16)  HR: 102 (13 Sep 2023 19:00) (50 - 109)  BP: 135/65 (13 Sep 2023 19:00) (77/36 - 150/68)  BP(mean): 86 (13 Sep 2023 19:00) (46 - 97)  RR: 23 (13 Sep 2023 19:00) (13 - 27)  SpO2: 90% (13 Sep 2023 19:00) (88% - 97%)    Parameters below as of 13 Sep 2023 19:00  Patient On (Oxygen Delivery Method): room air        LABS/DIAGNOSTIC TESTS:                          10.3   7.52  )-----------( 159      ( 13 Sep 2023 03:50 )             31.8         09-13    140  |  110<H>  |  18  ----------------------------<  79  4.2   |  26  |  0.68    Ca    7.9<L>      13 Sep 2023 03:50  Phos  2.4     09-13  Mg     2.3     09-13    TPro  5.4<L>  /  Alb  1.9<L>  /  TBili  0.6  /  DBili  x   /  AST  35  /  ALT  21  /  AlkPhos  50  09-13      LIVER FUNCTIONS - ( 13 Sep 2023 03:50 )  Alb: 1.9 g/dL / Pro: 5.4 g/dL / ALK PHOS: 50 U/L / ALT: 21 U/L DA / AST: 35 U/L / GGT: x             CULTURES: Abdominal Fl Abdominal Fluid  09-11 @ 22:00   Culture is being performed.  --  --      Clean Catch Clean Catch (Midstream)  09-11 @ 15:00   No growth  --  --      .Blood Blood-Peripheral  09-11 @ 12:50   No growth at 48 Hours  --  --      .Blood Blood-Peripheral  09-11 @ 12:45   Growth in anaerobic bottle: Bacteroides fragilis "Susceptibilities not  performed"  Direct identification is available within approximately 3-5  hours either by Blood Panel Multiplexed PCR or Direct  MALDI-TOF. Details: [email or web address]  --  Blood Culture PCR            RADIOLOGY:      ROS:  [  ] UNABLE TO ELICIT ICU VISIT  73y Female who remains in the ICU , she is looking better, she is off sedation and is wake nd alert and talking but is totally confused and the only thing that she says that makes sense is that she has abdominal pain. She is not vomiting or having any diarrhea, she has no fevers and has a normal WBC count. She is growing out Bacteroides Fragilis in her blood cultures.    Meds:  piperacillin/tazobactam IVPB.. 3.375 Gram(s) IV Intermittent every 8 hours    Allergies    sulfa drugs (Unknown)    Intolerances        VITALS:  Vital Signs Last 24 Hrs  T(C): 36.3 (13 Sep 2023 16:00), Max: 36.6 (12 Sep 2023 20:16)  T(F): 97.3 (13 Sep 2023 16:00), Max: 97.8 (12 Sep 2023 20:16)  HR: 102 (13 Sep 2023 19:00) (50 - 109)  BP: 135/65 (13 Sep 2023 19:00) (77/36 - 150/68)  BP(mean): 86 (13 Sep 2023 19:00) (46 - 97)  RR: 23 (13 Sep 2023 19:00) (13 - 27)  SpO2: 90% (13 Sep 2023 19:00) (88% - 97%)    Parameters below as of 13 Sep 2023 19:00  Patient On (Oxygen Delivery Method): room air        LABS/DIAGNOSTIC TESTS:                          10.3   7.52  )-----------( 159      ( 13 Sep 2023 03:50 )             31.8         09-13    140  |  110<H>  |  18  ----------------------------<  79  4.2   |  26  |  0.68    Ca    7.9<L>      13 Sep 2023 03:50  Phos  2.4     09-13  Mg     2.3     09-13    TPro  5.4<L>  /  Alb  1.9<L>  /  TBili  0.6  /  DBili  x   /  AST  35  /  ALT  21  /  AlkPhos  50  09-13      LIVER FUNCTIONS - ( 13 Sep 2023 03:50 )  Alb: 1.9 g/dL / Pro: 5.4 g/dL / ALK PHOS: 50 U/L / ALT: 21 U/L DA / AST: 35 U/L / GGT: x             CULTURES: Abdominal Fl Abdominal Fluid  09-11 @ 22:00   Culture is being performed.  --  --      Clean Catch Clean Catch (Midstream)  09-11 @ 15:00   No growth  --  --      .Blood Blood-Peripheral  09-11 @ 12:50   No growth at 48 Hours  --  --      .Blood Blood-Peripheral  09-11 @ 12:45   Growth in anaerobic bottle: Bacteroides fragilis "Susceptibilities not  performed"  Direct identification is available within approximately 3-5  hours either by Blood Panel Multiplexed PCR or Direct  MALDI-TOF. Details: [email or web address]  --  Blood Culture PCR            RADIOLOGY:      ROS:  [ x ] UNABLE TO ELICIT

## 2023-09-14 LAB
ALBUMIN SERPL ELPH-MCNC: 1.9 G/DL — LOW (ref 3.5–5)
ALP SERPL-CCNC: 54 U/L — SIGNIFICANT CHANGE UP (ref 40–120)
ALT FLD-CCNC: 23 U/L DA — SIGNIFICANT CHANGE UP (ref 10–60)
ANION GAP SERPL CALC-SCNC: 8 MMOL/L — SIGNIFICANT CHANGE UP (ref 5–17)
AST SERPL-CCNC: 34 U/L — SIGNIFICANT CHANGE UP (ref 10–40)
BASOPHILS # BLD AUTO: 0.03 K/UL — SIGNIFICANT CHANGE UP (ref 0–0.2)
BASOPHILS NFR BLD AUTO: 0.2 % — SIGNIFICANT CHANGE UP (ref 0–2)
BILIRUB SERPL-MCNC: 0.7 MG/DL — SIGNIFICANT CHANGE UP (ref 0.2–1.2)
BUN SERPL-MCNC: 11 MG/DL — SIGNIFICANT CHANGE UP (ref 7–18)
CALCIUM SERPL-MCNC: 7.7 MG/DL — LOW (ref 8.4–10.5)
CHLORIDE SERPL-SCNC: 110 MMOL/L — HIGH (ref 96–108)
CO2 SERPL-SCNC: 23 MMOL/L — SIGNIFICANT CHANGE UP (ref 22–31)
CREAT SERPL-MCNC: 0.57 MG/DL — SIGNIFICANT CHANGE UP (ref 0.5–1.3)
EGFR: 95 ML/MIN/1.73M2 — SIGNIFICANT CHANGE UP
EOSINOPHIL # BLD AUTO: 0.02 K/UL — SIGNIFICANT CHANGE UP (ref 0–0.5)
EOSINOPHIL NFR BLD AUTO: 0.2 % — SIGNIFICANT CHANGE UP (ref 0–6)
GLUCOSE BLDC GLUCOMTR-MCNC: 115 MG/DL — HIGH (ref 70–99)
GLUCOSE SERPL-MCNC: 91 MG/DL — SIGNIFICANT CHANGE UP (ref 70–99)
HCT VFR BLD CALC: 32.2 % — LOW (ref 34.5–45)
HGB BLD-MCNC: 10.8 G/DL — LOW (ref 11.5–15.5)
IMM GRANULOCYTES NFR BLD AUTO: 1.2 % — HIGH (ref 0–0.9)
LYMPHOCYTES # BLD AUTO: 1.64 K/UL — SIGNIFICANT CHANGE UP (ref 1–3.3)
LYMPHOCYTES # BLD AUTO: 13.6 % — SIGNIFICANT CHANGE UP (ref 13–44)
MAGNESIUM SERPL-MCNC: 2 MG/DL — SIGNIFICANT CHANGE UP (ref 1.6–2.6)
MCHC RBC-ENTMCNC: 28.7 PG — SIGNIFICANT CHANGE UP (ref 27–34)
MCHC RBC-ENTMCNC: 33.5 GM/DL — SIGNIFICANT CHANGE UP (ref 32–36)
MCV RBC AUTO: 85.6 FL — SIGNIFICANT CHANGE UP (ref 80–100)
MONOCYTES # BLD AUTO: 0.69 K/UL — SIGNIFICANT CHANGE UP (ref 0–0.9)
MONOCYTES NFR BLD AUTO: 5.7 % — SIGNIFICANT CHANGE UP (ref 2–14)
NEUTROPHILS # BLD AUTO: 9.52 K/UL — HIGH (ref 1.8–7.4)
NEUTROPHILS NFR BLD AUTO: 79.1 % — HIGH (ref 43–77)
NRBC # BLD: 0 /100 WBCS — SIGNIFICANT CHANGE UP (ref 0–0)
PHOSPHATE SERPL-MCNC: 1.1 MG/DL — LOW (ref 2.5–4.5)
PLATELET # BLD AUTO: 221 K/UL — SIGNIFICANT CHANGE UP (ref 150–400)
POTASSIUM SERPL-MCNC: 3.3 MMOL/L — LOW (ref 3.5–5.3)
POTASSIUM SERPL-SCNC: 3.3 MMOL/L — LOW (ref 3.5–5.3)
PROT SERPL-MCNC: 5.4 G/DL — LOW (ref 6–8.3)
RBC # BLD: 3.76 M/UL — LOW (ref 3.8–5.2)
RBC # FLD: 14 % — SIGNIFICANT CHANGE UP (ref 10.3–14.5)
SODIUM SERPL-SCNC: 141 MMOL/L — SIGNIFICANT CHANGE UP (ref 135–145)
WBC # BLD: 12.04 K/UL — HIGH (ref 3.8–10.5)
WBC # FLD AUTO: 12.04 K/UL — HIGH (ref 3.8–10.5)

## 2023-09-14 RX ORDER — POTASSIUM PHOSPHATE, MONOBASIC POTASSIUM PHOSPHATE, DIBASIC 236; 224 MG/ML; MG/ML
30 INJECTION, SOLUTION INTRAVENOUS ONCE
Refills: 0 | Status: COMPLETED | OUTPATIENT
Start: 2023-09-14 | End: 2023-09-14

## 2023-09-14 RX ORDER — POTASSIUM CHLORIDE 20 MEQ
10 PACKET (EA) ORAL
Refills: 0 | Status: COMPLETED | OUTPATIENT
Start: 2023-09-14 | End: 2023-09-14

## 2023-09-14 RX ORDER — ACETAMINOPHEN 500 MG
1000 TABLET ORAL ONCE
Refills: 0 | Status: COMPLETED | OUTPATIENT
Start: 2023-09-14 | End: 2023-09-14

## 2023-09-14 RX ORDER — HYDROMORPHONE HYDROCHLORIDE 2 MG/ML
0.5 INJECTION INTRAMUSCULAR; INTRAVENOUS; SUBCUTANEOUS ONCE
Refills: 0 | Status: DISCONTINUED | OUTPATIENT
Start: 2023-09-14 | End: 2023-09-14

## 2023-09-14 RX ADMIN — Medication 150 MILLIGRAM(S): at 21:49

## 2023-09-14 RX ADMIN — HYDROMORPHONE HYDROCHLORIDE 0.5 MILLIGRAM(S): 2 INJECTION INTRAMUSCULAR; INTRAVENOUS; SUBCUTANEOUS at 00:52

## 2023-09-14 RX ADMIN — POTASSIUM PHOSPHATE, MONOBASIC POTASSIUM PHOSPHATE, DIBASIC 83.33 MILLIMOLE(S): 236; 224 INJECTION, SOLUTION INTRAVENOUS at 05:50

## 2023-09-14 RX ADMIN — HYDROMORPHONE HYDROCHLORIDE 0.5 MILLIGRAM(S): 2 INJECTION INTRAMUSCULAR; INTRAVENOUS; SUBCUTANEOUS at 00:36

## 2023-09-14 RX ADMIN — PIPERACILLIN AND TAZOBACTAM 25 GRAM(S): 4; .5 INJECTION, POWDER, LYOPHILIZED, FOR SOLUTION INTRAVENOUS at 21:49

## 2023-09-14 RX ADMIN — HEPARIN SODIUM 5000 UNIT(S): 5000 INJECTION INTRAVENOUS; SUBCUTANEOUS at 05:30

## 2023-09-14 RX ADMIN — Medication 100 MILLIEQUIVALENT(S): at 09:53

## 2023-09-14 RX ADMIN — Medication 1000 MILLIGRAM(S): at 00:52

## 2023-09-14 RX ADMIN — Medication 150 MILLIGRAM(S): at 14:10

## 2023-09-14 RX ADMIN — PIPERACILLIN AND TAZOBACTAM 25 GRAM(S): 4; .5 INJECTION, POWDER, LYOPHILIZED, FOR SOLUTION INTRAVENOUS at 13:51

## 2023-09-14 RX ADMIN — Medication 100 MILLIEQUIVALENT(S): at 05:30

## 2023-09-14 RX ADMIN — CHLORHEXIDINE GLUCONATE 1 APPLICATION(S): 213 SOLUTION TOPICAL at 05:31

## 2023-09-14 RX ADMIN — HEPARIN SODIUM 5000 UNIT(S): 5000 INJECTION INTRAVENOUS; SUBCUTANEOUS at 21:50

## 2023-09-14 RX ADMIN — HEPARIN SODIUM 5000 UNIT(S): 5000 INJECTION INTRAVENOUS; SUBCUTANEOUS at 13:51

## 2023-09-14 RX ADMIN — Medication 100 MILLIEQUIVALENT(S): at 06:40

## 2023-09-14 RX ADMIN — PIPERACILLIN AND TAZOBACTAM 25 GRAM(S): 4; .5 INJECTION, POWDER, LYOPHILIZED, FOR SOLUTION INTRAVENOUS at 05:30

## 2023-09-14 RX ADMIN — Medication 400 MILLIGRAM(S): at 00:37

## 2023-09-14 NOTE — DIETITIAN INITIAL EVALUATION ADULT - ADD RECOMMEND
Diet advancement per Sx. If maintaining clears add Ensure clears TID. MD to monitor (including electrolytes/ repletion).RD available.

## 2023-09-14 NOTE — PROGRESS NOTE ADULT - ASSESSMENT
Patient is a 74 yo female with hx of COPD, not on home oxygen, neuropathy presents with abdominal pain. Upon evaluation in ED, patient noted with temp of 101.8, BP of 127/66, saturating 91% on Room air with hr of 124bpm. Physical examination noted with diffuse tenderness in abdominal region with guarding. Labs significant for leukocytosis of 13, lactate of 3.5 and scr of 1.41. CT abdomen and pelvis noted for free air in peritoneum and findings for perforated acute appendicitis with fidings of 6mm gall bladder lesion and right adnexal lesion. CXR negative. Surgery consulted and patient is scheduled for urgent ex lap, abdominal washout and possible bowel resection. ICU consulted for sepsis 2/2 perforated appendicitis. Patient admitted to ICU for sepsis 2/2 acute appendicitis.     #Acute Encephalopathy   #Sepsis  #Acute perforated appendicitis  #COPD  #Neuropathy  # Elevated lactate  # Gall bladder wall lesion   # MAYRA  #RIght Adnexal Lesion      ===========Neurology===========  #Acute encephalopathy   - patient noted at baseline to be Alert and oriented x3  - presented to ED Alert and oriented x1  - unable to complete full neurological exam   - can be 2/2 Sepsis induced vs possible CVA   - unable to get CT head due to receiving IV contrast  - admit to ICU   - continue with zosyn   - trend lactate  - will give 2L LR   - urine culture negative  - blood cx, 1 set pos for gram neg rods  - cont LR  - Improving to A&Ox2, following commands    ==========Cardiovascular=============  - No acute issues   - home meds include amlodipine and lisinopril, holding for now    ============Pulmonology===========  # Hx of COPD   - patient with hx of COPD   - not on home oxygen   - no home COPD meds  - on 4L NC    =========GI=============  # Sepsis 2/2 Perforated acute appendicitis   - noted with findings of acute perforated appendicitis on CT scan  - Pt underwent laparoscopic appendectomy and washout  - peritoneum full of feculent pus according to surgeon, Dr. Sunshine  - hemodynamically stable, not requiring pressors   - NPO  - IVF hydration   - continue with zosyn   - trend lactate  - urine culture negative  - blood cx, 1 set pos for gram neg rods  - ID consulted Dr. Moffett   - Pain control with 1 Dilaudid q6    #Gallbladder wall lesion  - patient on CT noted with Focal 6 mm enhancing lesion at the gallbladder fundal wall  - Consider MR abdomen when clinically stable      ==========Renal==========  #MAYRA  - patient noted to have Scr of 1.41 on admission  - avoid nephrotoxic agents  - Cr trended down to 0.68    =========Infectious Disease===========  # Sepsis 2/2 Perforated acute appendicitis   - noted with findings of acute perforated appendicitis on CT scan  - hemodynamically stable, not requiring pressors   - NPO  - IVF hydration   - Plan is for diagnostic laparoscopy with washout and possible bowel resection  - trend lactate  - urine culture negative  - blood cx, 1 set pos for gram neg rods  - ID consulted Dr. Moffett   - continue with zosyn     =========Heme===========  - No acute issues   - heparin subq for DVT prophylaxis    ==========Endo==========  #Neuropathy   - home med pregabalin, held for now while pt NPO    ==========Skin===========  - No acute issues     =========Reproductive===========  #Right Adnexal lesion  - CT abdomen showing 5.2 cm soft tissue density structure in the right adnexa may represent an exophytic uterine fibroid, or a right ovarian mass  - f/u pelvic US when stable     ========Prophylaxis=======  - heparin subq    Code Status: Full Code    Disposition: Patient accepted and transferred to ICU  Patient is a 74 yo female with hx of COPD, not on home oxygen, neuropathy presents with abdominal pain. Upon evaluation in ED, patient noted with temp of 101.8, BP of 127/66, saturating 91% on Room air with hr of 124bpm. Physical examination noted with diffuse tenderness in abdominal region with guarding. Labs significant for leukocytosis of 13, lactate of 3.5 and scr of 1.41. CT abdomen and pelvis noted for free air in peritoneum and findings for perforated acute appendicitis with fidings of 6mm gall bladder lesion and right adnexal lesion. CXR negative. Surgery consulted and patient is scheduled for urgent ex lap, abdominal washout and possible bowel resection. ICU consulted for sepsis 2/2 perforated appendicitis. Patient admitted to ICU for sepsis 2/2 acute appendicitis.     #Acute Encephalopathy   #Sepsis  #Acute perforated appendicitis  #COPD  #Neuropathy  # Elevated lactate  # Gall bladder wall lesion   # MAYRA  #RIght Adnexal Lesion      ===========Neurology===========  #Acute encephalopathy   - patient noted at baseline to be Alert and oriented x3  - presented to ED Alert and oriented x1  - unable to complete full neurological exam   - can be 2/2 Sepsis induced vs possible CVA   - unable to get CT head due to receiving IV contrast  - admit to ICU   - continue with zosyn   - trend lactate  - will give 2L LR   - urine culture negative  - blood cx, 1 set pos for gram neg rods bacteroides fragilis  - cont dextrose 5% + LR  - Improving to A&Ox3, following commands    ==========Cardiovascular=============  - No acute issues   - home meds include amlodipine and lisinopril, holding for now    ============Pulmonology===========  # Hx of COPD   - patient with hx of COPD   - not on home oxygen   - no home COPD meds  - on 4L NC  - Chest X-ray shows same findings as previous, increased central interstitial markings.    =========GI=============  # Sepsis 2/2 Perforated acute appendicitis   - noted with findings of acute perforated appendicitis on CT scan  - Pt underwent laparoscopic appendectomy and washout  - peritoneum full of feculent pus according to surgeon, Dr. Folek  - hemodynamically stable, not requiring pressors   - NPO  - IVF hydration   - continue with zosyn   - trend lactate  - urine culture negative  - blood cx, 1 set pos for gram neg rods Bacteroides fragilis  - ID consulted Dr. Moffett   - Pain control with 1 Dilaudid q6    #Gallbladder wall lesion  - patient on CT noted with Focal 6 mm enhancing lesion at the gallbladder fundal wall  - Consider MR abdomen when clinically stable      ==========Renal==========  #MAYRA  - patient noted to have Scr of 1.41 on admission  - avoid nephrotoxic agents  - Cr trended down to 0.68    =========Infectious Disease===========  # Sepsis 2/2 Perforated acute appendicitis   - noted with findings of acute perforated appendicitis on CT scan  - hemodynamically stable, not requiring pressors   - NPO  - IVF hydration   - Plan is for diagnostic laparoscopy with washout and possible bowel resection  - trend lactate  - urine culture negative  - blood cx, 1 set pos for gram neg rods  - ID consulted Dr. Moffett   - continue with zosyn     =========Heme===========  - No acute issues   - heparin subq for DVT prophylaxis  - RLE US negative for DVT    ==========Endo==========  #Neuropathy   - home med pregabalin    ==========Skin===========  - No acute issues     =========Reproductive===========  #Right Adnexal lesion  - CT abdomen showing 5.2 cm soft tissue density structure in the right adnexa may represent an exophytic uterine fibroid, or a right ovarian mass  - f/u pelvic US when stable     ========Prophylaxis=======  - heparin subq    Code Status: Full Code    Disposition: Patient accepted and transferred to ICU  Patient is a 74 yo female with hx of COPD, not on home oxygen, neuropathy presents with abdominal pain. Upon evaluation in ED, patient noted with temp of 101.8, BP of 127/66, saturating 91% on Room air with hr of 124bpm. Physical examination noted with diffuse tenderness in abdominal region with guarding. Labs significant for leukocytosis of 13, lactate of 3.5 and scr of 1.41. CT abdomen and pelvis noted for free air in peritoneum and findings for perforated acute appendicitis with fidings of 6mm gall bladder lesion and right adnexal lesion. CXR negative. Surgery consulted and patient is scheduled for urgent ex lap, abdominal washout and possible bowel resection. ICU consulted for sepsis 2/2 perforated appendicitis. Patient admitted to ICU for sepsis 2/2 acute appendicitis.     #Acute Encephalopathy   #Sepsis  #Acute perforated appendicitis  #COPD  #Neuropathy  # Elevated lactate  # Gall bladder wall lesion   # MAYRA  #RIght Adnexal Lesion      ===========Neurology===========  #Acute encephalopathy   - patient noted at baseline to be Alert and oriented x3  - presented to ED Alert and oriented x1  - unable to complete full neurological exam   - can be 2/2 Sepsis induced vs possible CVA   - unable to get CT head due to receiving IV contrast  - admit to ICU   - continue with zosyn   - trend lactate  - will give 2L LR   - urine culture negative  - blood cx, 1 set pos for gram neg rods bacteroides fragilis  - cont dextrose 5% + LR  - Improving to A&Ox3, following commands    ==========Cardiovascular=============  - No acute issues   - home meds include amlodipine and lisinopril, holding for now    ============Pulmonology===========  # Hx of COPD   - patient with hx of COPD   - not on home oxygen   - no home COPD meds  - on room air  - Chest X-ray shows same findings as previous, increased central interstitial markings.    =========GI=============  # Sepsis 2/2 Perforated acute appendicitis   - noted with findings of acute perforated appendicitis on CT scan  - Pt underwent laparoscopic appendectomy and washout  - peritoneum full of feculent pus according to surgeon, Dr. Folek  - hemodynamically stable, not requiring pressors   - IVF hydration   - continue with zosyn   - trend lactate  - urine culture negative  - blood cx, 1 set pos for gram neg rods Bacteroides fragilis  - ID consulted Dr. Moffett   - Pain control with 1 Dilaudid PRN  - Clear liquid diet    #Gallbladder wall lesion  - patient on CT noted with Focal 6 mm enhancing lesion at the gallbladder fundal wall  - Consider MR abdomen when clinically stable      ==========Renal==========  #MAYRA  - patient noted to have Scr of 1.41 on admission  - avoid nephrotoxic agents  - Cr trended down    =========Infectious Disease===========  # Sepsis 2/2 Perforated acute appendicitis   - noted with findings of acute perforated appendicitis on CT scan  - hemodynamically stable, not requiring pressors   - Clear liquid diet  - IVF hydration   - s/p diagnostic laparoscopy with appendectomy and washout  - trend lactate  - urine culture negative  - blood cx, 1 set pos for gram neg rods Bacteroides fragilis  - ID consulted Dr. Moffett   - continue with zosyn     =========Heme===========  - No acute issues   - heparin subq for DVT prophylaxis  - RLE US negative for DVT    ==========Endo==========  #Neuropathy   - home med pregabalin    ==========Skin===========  - No acute issues     =========Reproductive===========  #Right Adnexal lesion  - CT abdomen showing 5.2 cm soft tissue density structure in the right adnexa may represent an exophytic uterine fibroid, or a right ovarian mass  - f/u pelvic US when stable     ========Prophylaxis=======  - heparin subq    Code Status: Full Code    Disposition: Patient accepted and transferred to ICU

## 2023-09-14 NOTE — PROGRESS NOTE ADULT - SUBJECTIVE AND OBJECTIVE BOX
Reason for Admission:  · Reason for Admission	Sepsis 2/2 Perforated Appendicitis      · Subjective and Objective:   INTERVAL HPI/OVERNIGHT EVENTS: ***    PRESSORS: [ ] YES [ ] NO  WHICH:    Antimicrobial:  piperacillin/tazobactam IVPB.. 3.375 Gram(s) IV Intermittent every 8 hours    Cardiovascular:    Pulmonary:    Hematalogic:  heparin   Injectable 5000 Unit(s) SubCutaneous every 8 hours    Other:  chlorhexidine 2% Cloths 1 Application(s) Topical <User Schedule>  dextrose 5% + lactated ringers. 1000 milliLiter(s) IV Continuous <Continuous>  pantoprazole  Injectable 40 milliGRAM(s) IV Push daily  potassium chloride  10 mEq/100 mL IVPB 10 milliEquivalent(s) IV Intermittent every 1 hour    chlorhexidine 2% Cloths 1 Application(s) Topical <User Schedule>  dextrose 5% + lactated ringers. 1000 milliLiter(s) IV Continuous <Continuous>  heparin   Injectable 5000 Unit(s) SubCutaneous every 8 hours  pantoprazole  Injectable 40 milliGRAM(s) IV Push daily  piperacillin/tazobactam IVPB.. 3.375 Gram(s) IV Intermittent every 8 hours  potassium chloride  10 mEq/100 mL IVPB 10 milliEquivalent(s) IV Intermittent every 1 hour    Drug Dosing Weight  Height (cm): 170 (13 Sep 2023 10:41)  Weight (kg): 69.3 (11 Sep 2023 21:45)  BMI (kg/m2): 24 (13 Sep 2023 10:41)  BSA (m2): 1.8 (13 Sep 2023 10:41)    CENTRAL LINE: [ ] YES [ ] NO  LOCATION:   DATE INSERTED:  REMOVE: [ ] YES [ ] NO  EXPLAIN:    LINARES: [ ] YES [ ] NO    DATE INSERTED:  REMOVE:  [ ] YES [ ] NO  EXPLAIN:    A-LINE:  [ ] YES [ ] NO  LOCATION:   DATE INSERTED:  REMOVE:  [ ] YES [ ] NO  EXPLAIN:    PMH -reviewed admission note, no change since admission  PAST MEDICAL & SURGICAL HISTORY:  HTN (hypertension)      HLD (hyperlipidemia)      H/O neuropathy      History of COPD          ICU Vital Signs Last 24 Hrs  T(C): 36.1 (13 Sep 2023 20:00), Max: 36.3 (13 Sep 2023 16:00)  T(F): 97 (13 Sep 2023 20:00), Max: 97.3 (13 Sep 2023 16:00)  HR: 98 (14 Sep 2023 07:00) (50 - 113)  BP: 119/39 (14 Sep 2023 07:00) (77/36 - 150/68)  BP(mean): 62 (14 Sep 2023 07:00) (46 - 97)  ABP: --  ABP(mean): --  RR: 26 (14 Sep 2023 07:00) (15 - 27)  SpO2: 91% (14 Sep 2023 07:00) (88% - 97%)    O2 Parameters below as of 14 Sep 2023 00:00  Patient On (Oxygen Delivery Method): room air            ABG - ( 13 Sep 2023 03:49 )  pH, Arterial: 7.37  pH, Blood: x     /  pCO2: 40    /  pO2: 66    / HCO3: 23    / Base Excess: -2.0  /  SaO2: 96                    09-13 @ 07:01  -  09-14 @ 07:00  --------------------------------------------------------  IN: 1892.9 mL / OUT: 1535 mL / NET: 357.9 mL            PHYSICAL EXAM:    GENERAL: NAD, well-developed  HEAD:  Atraumatic, Normocephalic  EYES: EOMI, PERRLA, conjunctiva and sclera clear  ENMT: Moist mucous membranes  NECK: Supple, normal appearance, No JVD; Normal thyroid; Trachea midline  NERVOUS SYSTEM:  Alert & Oriented X3,  Motor Strength 5/5 B/L upper and lower extremities; DTRs 2+ intact and symmetric  CHEST/LUNG: No chest deformity; Clear to Auscultation; No rales, rhonchi, wheezing   HEART: Regular rate and rhythm; No murmurs, rubs, or gallops  ABDOMEN: Soft, Nontender, Nondistended; Bowel sounds present  EXTREMITIES:  2+ Peripheral Pulses, No clubbing, cyanosis, or edema  SKIN: No rashes or lesions;  Good capillary refill      LABS:  CBC Full  -  ( 14 Sep 2023 03:20 )  WBC Count : 12.04 K/uL  RBC Count : 3.76 M/uL  Hemoglobin : 10.8 g/dL  Hematocrit : 32.2 %  Platelet Count - Automated : 221 K/uL  Mean Cell Volume : 85.6 fl  Mean Cell Hemoglobin : 28.7 pg  Mean Cell Hemoglobin Concentration : 33.5 gm/dL  Auto Neutrophil # : 9.52 K/uL  Auto Lymphocyte # : 1.64 K/uL  Auto Monocyte # : 0.69 K/uL  Auto Eosinophil # : 0.02 K/uL  Auto Basophil # : 0.03 K/uL  Auto Neutrophil % : 79.1 %  Auto Lymphocyte % : 13.6 %  Auto Monocyte % : 5.7 %  Auto Eosinophil % : 0.2 %  Auto Basophil % : 0.2 %    09-14    141  |  110<H>  |  11  ----------------------------<  91  3.3<L>   |  23  |  0.57    Ca    7.7<L>      14 Sep 2023 03:20  Phos  1.1     09-14  Mg     2.0     09-14    TPro  5.4<L>  /  Alb  1.9<L>  /  TBili  0.7  /  DBili  x   /  AST  34  /  ALT  23  /  AlkPhos  54  09-14      Urinalysis Basic - ( 14 Sep 2023 03:20 )    Color: x / Appearance: x / SG: x / pH: x  Gluc: 91 mg/dL / Ketone: x  / Bili: x / Urobili: x   Blood: x / Protein: x / Nitrite: x   Leuk Esterase: x / RBC: x / WBC x   Sq Epi: x / Non Sq Epi: x / Bacteria: x      Culture Results:   Culture is being performed. (09-11 @ 22:00)  Culture Results:   No growth (09-11 @ 22:00)  Culture Results:   No growth (09-11 @ 15:00)  Culture Results:   No growth at 48 Hours (09-11 @ 12:50)  Culture Results:   Growth in anaerobic bottle: Bacteroides fragilis "Susceptibilities not  performed"  Direct identification is available within approximately 3-5  hours either by Blood Panel Multiplexed PCR or Direct  MALDI-TOF. Details: [email or web address] (09-11 @ 12:45)      RADIOLOGY & ADDITIONAL STUDIES REVIEWED:  ***    [ ]GOALS OF CARE DISCUSSION WITH PATIENT/FAMILY/PROXY:    CRITICAL CARE TIME SPENT: 35 minutes      Assessment and Plan:   · Assessment	  Patient is a 72 yo female with hx of COPD, not on home oxygen, neuropathy presents with abdominal pain. Upon evaluation in ED, patient noted with temp of 101.8, BP of 127/66, saturating 91% on Room air with hr of 124bpm. Physical examination noted with diffuse tenderness in abdominal region with guarding. Labs significant for leukocytosis of 13, lactate of 3.5 and scr of 1.41. CT abdomen and pelvis noted for free air in peritoneum and findings for perforated acute appendicitis with fidings of 6mm gall bladder lesion and right adnexal lesion. CXR negative. Surgery consulted and patient is scheduled for urgent ex lap, abdominal washout and possible bowel resection. ICU consulted for sepsis 2/2 perforated appendicitis. Patient admitted to ICU for sepsis 2/2 acute appendicitis.     #Acute Encephalopathy   #Sepsis  #Acute perforated appendicitis  #COPD  #Neuropathy  # Elevated lactate  # Gall bladder wall lesion   # MAYRA  #RIght Adnexal Lesion      ===========Neurology===========  #Acute encephalopathy   - patient noted at baseline to be Alert and oriented x3  - presented to ED Alert and oriented x1  - unable to complete full neurological exam   - can be 2/2 Sepsis induced vs possible CVA   - unable to get CT head due to receiving IV contrast  - admit to ICU   - continue with zosyn   - trend lactate  - will give 2L LR   - urine culture negative  - blood cx, 1 set pos for gram neg rods  - cont LR  - Improving to A&Ox2, following commands    ==========Cardiovascular=============  - No acute issues   - home meds include amlodipine and lisinopril, holding for now    ============Pulmonology===========  # Hx of COPD   - patient with hx of COPD   - not on home oxygen   - no home COPD meds  - on 4L NC    =========GI=============  # Sepsis 2/2 Perforated acute appendicitis   - noted with findings of acute perforated appendicitis on CT scan  - Pt underwent laparoscopic appendectomy and washout  - peritoneum full of feculent pus according to surgeon, Dr. Sunshine  - hemodynamically stable, not requiring pressors   - NPO  - IVF hydration   - continue with zosyn   - trend lactate  - urine culture negative  - blood cx, 1 set pos for gram neg rods  - ID consulted Dr. Moffett   - Pain control with 1 Dilaudid q6    #Gallbladder wall lesion  - patient on CT noted with Focal 6 mm enhancing lesion at the gallbladder fundal wall  - Consider MR abdomen when clinically stable      ==========Renal==========  #MAYRA  - patient noted to have Scr of 1.41 on admission  - avoid nephrotoxic agents  - Cr trended down to 0.68    =========Infectious Disease===========  # Sepsis 2/2 Perforated acute appendicitis   - noted with findings of acute perforated appendicitis on CT scan  - hemodynamically stable, not requiring pressors   - NPO  - IVF hydration   - Plan is for diagnostic laparoscopy with washout and possible bowel resection  - trend lactate  - urine culture negative  - blood cx, 1 set pos for gram neg rods  - ID consulted Dr. Moffett   - continue with zosyn     =========Heme===========  - No acute issues   - heparin subq for DVT prophylaxis    ==========Endo==========  #Neuropathy   - home med pregabalin, held for now while pt NPO    ==========Skin===========  - No acute issues     =========Reproductive===========  #Right Adnexal lesion  - CT abdomen showing 5.2 cm soft tissue density structure in the right adnexa may represent an exophytic uterine fibroid, or a right ovarian mass  - f/u pelvic US when stable     ========Prophylaxis=======  - heparin subq    Code Status: Full Code    Disposition: Patient accepted and transferred to ICU

## 2023-09-14 NOTE — PROGRESS NOTE ADULT - SUBJECTIVE AND OBJECTIVE BOX
No acute overnight, patient alert answering questions, pain controlled, passing gas. Voiding with baker    ICU Vital Signs Last 24 Hrs  T(C): 36.1 (13 Sep 2023 20:00), Max: 36.3 (13 Sep 2023 16:00)  T(F): 97 (13 Sep 2023 20:00), Max: 97.3 (13 Sep 2023 16:00)  HR: 100 (14 Sep 2023 08:00) (50 - 113)  BP: 119/39 (14 Sep 2023 07:00) (77/36 - 150/68)  BP(mean): 62 (14 Sep 2023 07:00) (46 - 97)  RR: 24 (14 Sep 2023 08:00) (15 - 27)  SpO2: 91% (14 Sep 2023 08:00) (88% - 97%)    O2 Parameters below as of 14 Sep 2023 00:00  Patient On (Oxygen Delivery Method): room air    I&O's Detail    13 Sep 2023 07:01  -  14 Sep 2023 07:00  --------------------------------------------------------  IN:    Dexmedetomidine: 12.9 mL    dextrose 5% + lactated ringers: 480 mL    IV PiggyBack: 100 mL    IV PiggyBack: 200 mL    IV PiggyBack: 166.6 mL    Lactated Ringers: 700 mL    Lactated Ringers: 600 mL  Total IN: 2259.5 mL    OUT:    Bulb (mL): 180 mL    Bulb (mL): 110 mL    Indwelling Catheter - Urethral (mL): 1345 mL  Total OUT: 1635 mL    Total NET: 624.5 mL      14 Sep 2023 07:01  -  14 Sep 2023 08:32  --------------------------------------------------------  IN:    IV PiggyBack: 166.6 mL    IV PiggyBack: 100 mL  Total IN: 266.6 mL    OUT:    Indwelling Catheter - Urethral (mL): 100 mL  Total OUT: 100 mL    Total NET: 166.6 mL        CONSTITUTIONAL: alert and answering questions.  RESP: No respiratory distress, no use of accessory muscles  CV: RRR  GI: Soft, mild Tenderness, ND, no rebound, no guarding; incisions C/D/I, drains in place SS output. 110/180                          10.8   12.04 )-----------( 221      ( 14 Sep 2023 03:20 )             32.2     09-14    141  |  110<H>  |  11  ----------------------------<  91  3.3<L>   |  23  |  0.57    Ca    7.7<L>      14 Sep 2023 03:20  Phos  1.1     09-14  Mg     2.0     09-14    TPro  5.4<L>  /  Alb  1.9<L>  /  TBili  0.7  /  DBili  x   /  AST  34  /  ALT  23  /  AlkPhos  54  09-14

## 2023-09-14 NOTE — PROGRESS NOTE ADULT - SUBJECTIVE AND OBJECTIVE BOX
INTERVAL HPI/OVERNIGHT EVENTS: ***    PRESSORS: [ ] YES [ ] NO  WHICH:    Antimicrobial:  piperacillin/tazobactam IVPB.. 3.375 Gram(s) IV Intermittent every 8 hours    Cardiovascular:    Pulmonary:    Hematalogic:  heparin   Injectable 5000 Unit(s) SubCutaneous every 8 hours    Other:  chlorhexidine 2% Cloths 1 Application(s) Topical <User Schedule>  dextrose 5% + lactated ringers. 1000 milliLiter(s) IV Continuous <Continuous>  pantoprazole  Injectable 40 milliGRAM(s) IV Push daily  potassium chloride  10 mEq/100 mL IVPB 10 milliEquivalent(s) IV Intermittent every 1 hour    chlorhexidine 2% Cloths 1 Application(s) Topical <User Schedule>  dextrose 5% + lactated ringers. 1000 milliLiter(s) IV Continuous <Continuous>  heparin   Injectable 5000 Unit(s) SubCutaneous every 8 hours  pantoprazole  Injectable 40 milliGRAM(s) IV Push daily  piperacillin/tazobactam IVPB.. 3.375 Gram(s) IV Intermittent every 8 hours  potassium chloride  10 mEq/100 mL IVPB 10 milliEquivalent(s) IV Intermittent every 1 hour    Drug Dosing Weight  Height (cm): 170 (13 Sep 2023 10:41)  Weight (kg): 69.3 (11 Sep 2023 21:45)  BMI (kg/m2): 24 (13 Sep 2023 10:41)  BSA (m2): 1.8 (13 Sep 2023 10:41)    CENTRAL LINE: [ ] YES [ ] NO  LOCATION:   DATE INSERTED:  REMOVE: [ ] YES [ ] NO  EXPLAIN:    LINARES: [ ] YES [ ] NO    DATE INSERTED:  REMOVE:  [ ] YES [ ] NO  EXPLAIN:    A-LINE:  [ ] YES [ ] NO  LOCATION:   DATE INSERTED:  REMOVE:  [ ] YES [ ] NO  EXPLAIN:    PMH -reviewed admission note, no change since admission  PAST MEDICAL & SURGICAL HISTORY:  HTN (hypertension)      HLD (hyperlipidemia)      H/O neuropathy      History of COPD          ICU Vital Signs Last 24 Hrs  T(C): 36.1 (13 Sep 2023 20:00), Max: 36.3 (13 Sep 2023 16:00)  T(F): 97 (13 Sep 2023 20:00), Max: 97.3 (13 Sep 2023 16:00)  HR: 98 (14 Sep 2023 07:00) (50 - 113)  BP: 119/39 (14 Sep 2023 07:00) (77/36 - 150/68)  BP(mean): 62 (14 Sep 2023 07:00) (46 - 97)  ABP: --  ABP(mean): --  RR: 26 (14 Sep 2023 07:00) (15 - 27)  SpO2: 91% (14 Sep 2023 07:00) (88% - 97%)    O2 Parameters below as of 14 Sep 2023 00:00  Patient On (Oxygen Delivery Method): room air            ABG - ( 13 Sep 2023 03:49 )  pH, Arterial: 7.37  pH, Blood: x     /  pCO2: 40    /  pO2: 66    / HCO3: 23    / Base Excess: -2.0  /  SaO2: 96                    09-13 @ 07:01  -  09-14 @ 07:00  --------------------------------------------------------  IN: 1892.9 mL / OUT: 1535 mL / NET: 357.9 mL            PHYSICAL EXAM:    GENERAL: NAD, well-developed  HEAD:  Atraumatic, Normocephalic  EYES: EOMI, PERRLA, conjunctiva and sclera clear  ENMT: Moist mucous membranes  NECK: Supple, normal appearance, No JVD; Normal thyroid; Trachea midline  NERVOUS SYSTEM:  Alert & Oriented X3,  Motor Strength 5/5 B/L upper and lower extremities; DTRs 2+ intact and symmetric  CHEST/LUNG: No chest deformity; Clear to Auscultation; No rales, rhonchi, wheezing   HEART: Regular rate and rhythm; No murmurs, rubs, or gallops  ABDOMEN: Soft, Nontender, Nondistended; Bowel sounds present  EXTREMITIES:  2+ Peripheral Pulses, No clubbing, cyanosis, or edema  SKIN: No rashes or lesions;  Good capillary refill      LABS:  CBC Full  -  ( 14 Sep 2023 03:20 )  WBC Count : 12.04 K/uL  RBC Count : 3.76 M/uL  Hemoglobin : 10.8 g/dL  Hematocrit : 32.2 %  Platelet Count - Automated : 221 K/uL  Mean Cell Volume : 85.6 fl  Mean Cell Hemoglobin : 28.7 pg  Mean Cell Hemoglobin Concentration : 33.5 gm/dL  Auto Neutrophil # : 9.52 K/uL  Auto Lymphocyte # : 1.64 K/uL  Auto Monocyte # : 0.69 K/uL  Auto Eosinophil # : 0.02 K/uL  Auto Basophil # : 0.03 K/uL  Auto Neutrophil % : 79.1 %  Auto Lymphocyte % : 13.6 %  Auto Monocyte % : 5.7 %  Auto Eosinophil % : 0.2 %  Auto Basophil % : 0.2 %    09-14    141  |  110<H>  |  11  ----------------------------<  91  3.3<L>   |  23  |  0.57    Ca    7.7<L>      14 Sep 2023 03:20  Phos  1.1     09-14  Mg     2.0     09-14    TPro  5.4<L>  /  Alb  1.9<L>  /  TBili  0.7  /  DBili  x   /  AST  34  /  ALT  23  /  AlkPhos  54  09-14      Urinalysis Basic - ( 14 Sep 2023 03:20 )    Color: x / Appearance: x / SG: x / pH: x  Gluc: 91 mg/dL / Ketone: x  / Bili: x / Urobili: x   Blood: x / Protein: x / Nitrite: x   Leuk Esterase: x / RBC: x / WBC x   Sq Epi: x / Non Sq Epi: x / Bacteria: x      Culture Results:   Culture is being performed. (09-11 @ 22:00)  Culture Results:   No growth (09-11 @ 22:00)  Culture Results:   No growth (09-11 @ 15:00)  Culture Results:   No growth at 48 Hours (09-11 @ 12:50)  Culture Results:   Growth in anaerobic bottle: Bacteroides fragilis "Susceptibilities not  performed"  Direct identification is available within approximately 3-5  hours either by Blood Panel Multiplexed PCR or Direct  MALDI-TOF. Details: [email or web address] (09-11 @ 12:45)      RADIOLOGY & ADDITIONAL STUDIES REVIEWED:  ***    [ ]GOALS OF CARE DISCUSSION WITH PATIENT/FAMILY/PROXY:    CRITICAL CARE TIME SPENT: 35 minutes INTERVAL HPI/OVERNIGHT EVENTS:   No acute events overnight.  Pt continued to have abdominal pain overnight so was given dilaudid and tylenol. Patient examined at bedside this AM.  Mental status has improved significantly, now A&Ox3, following commands, and recalling events from yesterday. Endorses superificial abdominal pain around incision sites and nerve pain in b/l lower extremities.    PRESSORS: [ ] YES [X] NO    Antimicrobial:  piperacillin/tazobactam IVPB.. 3.375 Gram(s) IV Intermittent every 8 hours    Cardiovascular:    Pulmonary:    Hematalogic:  heparin   Injectable 5000 Unit(s) SubCutaneous every 8 hours    Other:  chlorhexidine 2% Cloths 1 Application(s) Topical <User Schedule>  dextrose 5% + lactated ringers. 1000 milliLiter(s) IV Continuous <Continuous>  pantoprazole  Injectable 40 milliGRAM(s) IV Push daily  potassium chloride  10 mEq/100 mL IVPB 10 milliEquivalent(s) IV Intermittent every 1 hour    chlorhexidine 2% Cloths 1 Application(s) Topical <User Schedule>  dextrose 5% + lactated ringers. 1000 milliLiter(s) IV Continuous <Continuous>  heparin   Injectable 5000 Unit(s) SubCutaneous every 8 hours  pantoprazole  Injectable 40 milliGRAM(s) IV Push daily  piperacillin/tazobactam IVPB.. 3.375 Gram(s) IV Intermittent every 8 hours  potassium chloride  10 mEq/100 mL IVPB 10 milliEquivalent(s) IV Intermittent every 1 hour    Drug Dosing Weight  Height (cm): 170 (13 Sep 2023 10:41)  Weight (kg): 69.3 (11 Sep 2023 21:45)  BMI (kg/m2): 24 (13 Sep 2023 10:41)  BSA (m2): 1.8 (13 Sep 2023 10:41)    CENTRAL LINE: [ ] YES [X] NO  LOCATION:   DATE INSERTED:  REMOVE: [ ] YES [ ] NO  EXPLAIN:    LINARES: [X] YES [ ] NO    DATE INSERTED:  REMOVE:  [X] YES [ ] NO  EXPLAIN: Attempting TOV today    A-LINE:  [ ] YES [X] NO  LOCATION:   DATE INSERTED:  REMOVE:  [ ] YES [ ] NO  EXPLAIN:     PMH -reviewed admission note, no change since admission  PAST MEDICAL & SURGICAL HISTORY:  HTN (hypertension)      HLD (hyperlipidemia)      H/O neuropathy      History of COPD          ICU Vital Signs Last 24 Hrs  T(C): 36.1 (13 Sep 2023 20:00), Max: 36.3 (13 Sep 2023 16:00)  T(F): 97 (13 Sep 2023 20:00), Max: 97.3 (13 Sep 2023 16:00)  HR: 98 (14 Sep 2023 07:00) (50 - 113)  BP: 119/39 (14 Sep 2023 07:00) (77/36 - 150/68)  BP(mean): 62 (14 Sep 2023 07:00) (46 - 97)  ABP: --  ABP(mean): --  RR: 26 (14 Sep 2023 07:00) (15 - 27)  SpO2: 91% (14 Sep 2023 07:00) (88% - 97%)    O2 Parameters below as of 14 Sep 2023 00:00  Patient On (Oxygen Delivery Method): room air            ABG - ( 13 Sep 2023 03:49 )  pH, Arterial: 7.37  pH, Blood: x     /  pCO2: 40    /  pO2: 66    / HCO3: 23    / Base Excess: -2.0  /  SaO2: 96                    09-13 @ 07:01  -  09-14 @ 07:00  --------------------------------------------------------  IN: 1892.9 mL / OUT: 1535 mL / NET: 357.9 mL            PHYSICAL EXAM:    GENERAL: NAD, well-developed, Linares draining clear yellow urine,   HEAD:  Atraumatic, Normocephalic  EYES: EOMI, PERRLA, conjunctiva and sclera clear  ENMT: Moist mucous membranes  NECK: Supple, normal appearance, No JVD; Normal thyroid; Trachea midline  NERVOUS SYSTEM:  Alert & Oriented X3, following commands, moving all extremities  CHEST/LUNG: No chest deformity; Clear to Auscultation; No rales, rhonchi, wheezing   HEART: Regular rate and rhythm; No murmurs, rubs, or gallops  ABDOMEN: two MARY LOU drains with serous fluid. Soft, Nontender, Nondistended; Bowel sounds present  EXTREMITIES:  2+ Peripheral Pulses, No clubbing, cyanosis, or edema  SKIN: No rashes or lesions;  Good capillary refill      LABS:  CBC Full  -  ( 14 Sep 2023 03:20 )  WBC Count : 12.04 K/uL  RBC Count : 3.76 M/uL  Hemoglobin : 10.8 g/dL  Hematocrit : 32.2 %  Platelet Count - Automated : 221 K/uL  Mean Cell Volume : 85.6 fl  Mean Cell Hemoglobin : 28.7 pg  Mean Cell Hemoglobin Concentration : 33.5 gm/dL  Auto Neutrophil # : 9.52 K/uL  Auto Lymphocyte # : 1.64 K/uL  Auto Monocyte # : 0.69 K/uL  Auto Eosinophil # : 0.02 K/uL  Auto Basophil # : 0.03 K/uL  Auto Neutrophil % : 79.1 %  Auto Lymphocyte % : 13.6 %  Auto Monocyte % : 5.7 %  Auto Eosinophil % : 0.2 %  Auto Basophil % : 0.2 %    09-14    141  |  110<H>  |  11  ----------------------------<  91  3.3<L>   |  23  |  0.57    Ca    7.7<L>      14 Sep 2023 03:20  Phos  1.1     09-14  Mg     2.0     09-14    TPro  5.4<L>  /  Alb  1.9<L>  /  TBili  0.7  /  DBili  x   /  AST  34  /  ALT  23  /  AlkPhos  54  09-14      Urinalysis Basic - ( 14 Sep 2023 03:20 )    Color: x / Appearance: x / SG: x / pH: x  Gluc: 91 mg/dL / Ketone: x  / Bili: x / Urobili: x   Blood: x / Protein: x / Nitrite: x   Leuk Esterase: x / RBC: x / WBC x   Sq Epi: x / Non Sq Epi: x / Bacteria: x      Culture Results:   Culture is being performed. (09-11 @ 22:00)  Culture Results:   No growth (09-11 @ 22:00)  Culture Results:   No growth (09-11 @ 15:00)  Culture Results:   No growth at 48 Hours (09-11 @ 12:50)  Culture Results:   Growth in anaerobic bottle: Bacteroides fragilis "Susceptibilities not  performed"  Direct identification is available within approximately 3-5  hours either by Blood Panel Multiplexed PCR or Direct  MALDI-TOF. Details: [email or web address] (09-11 @ 12:45)      RADIOLOGY & ADDITIONAL STUDIES REVIEWED:  ***    [ ]GOALS OF CARE DISCUSSION WITH PATIENT/FAMILY/PROXY:    CRITICAL CARE TIME SPENT: 35 minutes

## 2023-09-14 NOTE — DIETITIAN INITIAL EVALUATION ADULT - PERTINENT MEDS FT
MEDICATIONS  (STANDING):  chlorhexidine 2% Cloths 1 Application(s) Topical <User Schedule>  heparin   Injectable 5000 Unit(s) SubCutaneous every 8 hours  piperacillin/tazobactam IVPB.. 3.375 Gram(s) IV Intermittent every 8 hours    MEDICATIONS  (PRN):

## 2023-09-14 NOTE — DIETITIAN INITIAL EVALUATION ADULT - PERTINENT LABORATORY DATA
09-14    141  |  110<H>  |  11  ----------------------------<  91  3.3<L>   |  23  |  0.57    Ca    7.7<L>      14 Sep 2023 03:20  Phos  1.1     09-14  Mg     2.0     09-14    TPro  5.4<L>  /  Alb  1.9<L>  /  TBili  0.7  /  DBili  x   /  AST  34  /  ALT  23  /  AlkPhos  54  09-14  POCT Blood Glucose.: 115 mg/dL (09-14-23 @ 05:48)

## 2023-09-14 NOTE — DIETITIAN INITIAL EVALUATION ADULT - OTHER INFO
S/p lap appy w/ post-op dx of  acute appendicitis with perforation, localized peritonitis, abscess, and gangrene.  Pt visited, sister present. No weight loss or decreased intake PTA. Pt prefers to try easy to chew texture due to dentition issues when diet advanced to solids. Of note: Hx COPD (not on home O2) active smoker.

## 2023-09-14 NOTE — PROGRESS NOTE ADULT - ASSESSMENT
76-year-old-female presented with sepsis/bacteremia due to perforated appendicitis, s/p lap appendectomy and washout and 2 MARY LOU drain placement. Transferred back to SICU for resuscitation and monitoring.   HDS, improving neurological status and UOP     Continue SICU care  Replace lytes PRN  Strict I/O, UOP > 0.5cc/kg/h, IVFm + PRN  Advance to clears   MARY LOU care  IS - Sat 89-93 COPD   ABX by ID (Bacteremia, peritonitis - perf appy, RUL PNA

## 2023-09-15 LAB
-  AMIKACIN: SIGNIFICANT CHANGE UP
-  AMIKACIN: SIGNIFICANT CHANGE UP
-  AMOXICILLIN/CLAVULANIC ACID: SIGNIFICANT CHANGE UP
-  AMOXICILLIN/CLAVULANIC ACID: SIGNIFICANT CHANGE UP
-  AMPICILLIN/SULBACTAM: SIGNIFICANT CHANGE UP
-  AMPICILLIN/SULBACTAM: SIGNIFICANT CHANGE UP
-  AMPICILLIN: SIGNIFICANT CHANGE UP
-  AMPICILLIN: SIGNIFICANT CHANGE UP
-  AZTREONAM: SIGNIFICANT CHANGE UP
-  AZTREONAM: SIGNIFICANT CHANGE UP
-  CEFAZOLIN: SIGNIFICANT CHANGE UP
-  CEFAZOLIN: SIGNIFICANT CHANGE UP
-  CEFEPIME: SIGNIFICANT CHANGE UP
-  CEFEPIME: SIGNIFICANT CHANGE UP
-  CEFOXITIN: SIGNIFICANT CHANGE UP
-  CEFOXITIN: SIGNIFICANT CHANGE UP
-  CEFTRIAXONE: SIGNIFICANT CHANGE UP
-  CEFTRIAXONE: SIGNIFICANT CHANGE UP
-  CIPROFLOXACIN: SIGNIFICANT CHANGE UP
-  CIPROFLOXACIN: SIGNIFICANT CHANGE UP
-  ERTAPENEM: SIGNIFICANT CHANGE UP
-  ERTAPENEM: SIGNIFICANT CHANGE UP
-  GENTAMICIN: SIGNIFICANT CHANGE UP
-  GENTAMICIN: SIGNIFICANT CHANGE UP
-  IMIPENEM: SIGNIFICANT CHANGE UP
-  IMIPENEM: SIGNIFICANT CHANGE UP
-  LEVOFLOXACIN: SIGNIFICANT CHANGE UP
-  LEVOFLOXACIN: SIGNIFICANT CHANGE UP
-  MEROPENEM: SIGNIFICANT CHANGE UP
-  MEROPENEM: SIGNIFICANT CHANGE UP
-  PIPERACILLIN/TAZOBACTAM: SIGNIFICANT CHANGE UP
-  PIPERACILLIN/TAZOBACTAM: SIGNIFICANT CHANGE UP
-  TOBRAMYCIN: SIGNIFICANT CHANGE UP
-  TOBRAMYCIN: SIGNIFICANT CHANGE UP
-  TRIMETHOPRIM/SULFAMETHOXAZOLE: SIGNIFICANT CHANGE UP
-  TRIMETHOPRIM/SULFAMETHOXAZOLE: SIGNIFICANT CHANGE UP
ALBUMIN SERPL ELPH-MCNC: 2 G/DL — LOW (ref 3.5–5)
ALP SERPL-CCNC: 60 U/L — SIGNIFICANT CHANGE UP (ref 40–120)
ALT FLD-CCNC: 22 U/L DA — SIGNIFICANT CHANGE UP (ref 10–60)
ANION GAP SERPL CALC-SCNC: 5 MMOL/L — SIGNIFICANT CHANGE UP (ref 5–17)
AST SERPL-CCNC: 21 U/L — SIGNIFICANT CHANGE UP (ref 10–40)
BASOPHILS # BLD AUTO: 0 K/UL — SIGNIFICANT CHANGE UP (ref 0–0.2)
BASOPHILS NFR BLD AUTO: 0 % — SIGNIFICANT CHANGE UP (ref 0–2)
BILIRUB SERPL-MCNC: 0.6 MG/DL — SIGNIFICANT CHANGE UP (ref 0.2–1.2)
BUN SERPL-MCNC: 6 MG/DL — LOW (ref 7–18)
CALCIUM SERPL-MCNC: 7.8 MG/DL — LOW (ref 8.4–10.5)
CHLORIDE SERPL-SCNC: 108 MMOL/L — SIGNIFICANT CHANGE UP (ref 96–108)
CO2 SERPL-SCNC: 28 MMOL/L — SIGNIFICANT CHANGE UP (ref 22–31)
CREAT SERPL-MCNC: 0.54 MG/DL — SIGNIFICANT CHANGE UP (ref 0.5–1.3)
DACRYOCYTES BLD QL SMEAR: SLIGHT — SIGNIFICANT CHANGE UP
EGFR: 97 ML/MIN/1.73M2 — SIGNIFICANT CHANGE UP
EOSINOPHIL # BLD AUTO: 0.68 K/UL — HIGH (ref 0–0.5)
EOSINOPHIL NFR BLD AUTO: 5 % — SIGNIFICANT CHANGE UP (ref 0–6)
GLUCOSE SERPL-MCNC: 88 MG/DL — SIGNIFICANT CHANGE UP (ref 70–99)
GRAM STN FLD: SIGNIFICANT CHANGE UP
HCT VFR BLD CALC: 33.1 % — LOW (ref 34.5–45)
HGB BLD-MCNC: 10.9 G/DL — LOW (ref 11.5–15.5)
HYPOCHROMIA BLD QL: SLIGHT — SIGNIFICANT CHANGE UP
LYMPHOCYTES # BLD AUTO: 16 % — SIGNIFICANT CHANGE UP (ref 13–44)
LYMPHOCYTES # BLD AUTO: 2.17 K/UL — SIGNIFICANT CHANGE UP (ref 1–3.3)
MAGNESIUM SERPL-MCNC: 2 MG/DL — SIGNIFICANT CHANGE UP (ref 1.6–2.6)
MANUAL SMEAR VERIFICATION: SIGNIFICANT CHANGE UP
MCHC RBC-ENTMCNC: 28.5 PG — SIGNIFICANT CHANGE UP (ref 27–34)
MCHC RBC-ENTMCNC: 32.9 GM/DL — SIGNIFICANT CHANGE UP (ref 32–36)
MCV RBC AUTO: 86.6 FL — SIGNIFICANT CHANGE UP (ref 80–100)
METHOD TYPE: SIGNIFICANT CHANGE UP
METHOD TYPE: SIGNIFICANT CHANGE UP
MONOCYTES # BLD AUTO: 0.27 K/UL — SIGNIFICANT CHANGE UP (ref 0–0.9)
MONOCYTES NFR BLD AUTO: 2 % — SIGNIFICANT CHANGE UP (ref 2–14)
NEUTROPHILS # BLD AUTO: 10.43 K/UL — HIGH (ref 1.8–7.4)
NEUTROPHILS NFR BLD AUTO: 77 % — SIGNIFICANT CHANGE UP (ref 43–77)
NRBC # BLD: 0 /100 — SIGNIFICANT CHANGE UP (ref 0–0)
PHOSPHATE SERPL-MCNC: 1.6 MG/DL — LOW (ref 2.5–4.5)
PLAT MORPH BLD: NORMAL — SIGNIFICANT CHANGE UP
PLATELET # BLD AUTO: 230 K/UL — SIGNIFICANT CHANGE UP (ref 150–400)
PLATELET COUNT - ESTIMATE: NORMAL — SIGNIFICANT CHANGE UP
POIKILOCYTOSIS BLD QL AUTO: SLIGHT — SIGNIFICANT CHANGE UP
POTASSIUM SERPL-MCNC: 3.4 MMOL/L — LOW (ref 3.5–5.3)
POTASSIUM SERPL-SCNC: 3.4 MMOL/L — LOW (ref 3.5–5.3)
PROT SERPL-MCNC: 5.6 G/DL — LOW (ref 6–8.3)
RBC # BLD: 3.82 M/UL — SIGNIFICANT CHANGE UP (ref 3.8–5.2)
RBC # FLD: 14.2 % — SIGNIFICANT CHANGE UP (ref 10.3–14.5)
RBC BLD AUTO: ABNORMAL
SODIUM SERPL-SCNC: 141 MMOL/L — SIGNIFICANT CHANGE UP (ref 135–145)
WBC # BLD: 13.55 K/UL — HIGH (ref 3.8–10.5)
WBC # FLD AUTO: 13.55 K/UL — HIGH (ref 3.8–10.5)

## 2023-09-15 RX ORDER — FAMOTIDINE 10 MG/ML
20 INJECTION INTRAVENOUS DAILY
Refills: 0 | Status: DISCONTINUED | OUTPATIENT
Start: 2023-09-15 | End: 2023-09-16

## 2023-09-15 RX ORDER — POTASSIUM PHOSPHATE, MONOBASIC POTASSIUM PHOSPHATE, DIBASIC 236; 224 MG/ML; MG/ML
30 INJECTION, SOLUTION INTRAVENOUS ONCE
Refills: 0 | Status: COMPLETED | OUTPATIENT
Start: 2023-09-15 | End: 2023-09-15

## 2023-09-15 RX ORDER — POTASSIUM CHLORIDE 20 MEQ
40 PACKET (EA) ORAL ONCE
Refills: 0 | Status: COMPLETED | OUTPATIENT
Start: 2023-09-15 | End: 2023-09-15

## 2023-09-15 RX ORDER — MORPHINE SULFATE 50 MG/1
2 CAPSULE, EXTENDED RELEASE ORAL EVERY 6 HOURS
Refills: 0 | Status: DISCONTINUED | OUTPATIENT
Start: 2023-09-15 | End: 2023-09-16

## 2023-09-15 RX ADMIN — Medication 150 MILLIGRAM(S): at 21:17

## 2023-09-15 RX ADMIN — Medication 150 MILLIGRAM(S): at 05:48

## 2023-09-15 RX ADMIN — HEPARIN SODIUM 5000 UNIT(S): 5000 INJECTION INTRAVENOUS; SUBCUTANEOUS at 21:17

## 2023-09-15 RX ADMIN — PIPERACILLIN AND TAZOBACTAM 25 GRAM(S): 4; .5 INJECTION, POWDER, LYOPHILIZED, FOR SOLUTION INTRAVENOUS at 05:48

## 2023-09-15 RX ADMIN — Medication 150 MILLIGRAM(S): at 13:45

## 2023-09-15 RX ADMIN — HEPARIN SODIUM 5000 UNIT(S): 5000 INJECTION INTRAVENOUS; SUBCUTANEOUS at 05:47

## 2023-09-15 RX ADMIN — CHLORHEXIDINE GLUCONATE 1 APPLICATION(S): 213 SOLUTION TOPICAL at 05:48

## 2023-09-15 RX ADMIN — MORPHINE SULFATE 2 MILLIGRAM(S): 50 CAPSULE, EXTENDED RELEASE ORAL at 08:26

## 2023-09-15 RX ADMIN — Medication 40 MILLIEQUIVALENT(S): at 05:54

## 2023-09-15 RX ADMIN — MORPHINE SULFATE 2 MILLIGRAM(S): 50 CAPSULE, EXTENDED RELEASE ORAL at 08:41

## 2023-09-15 RX ADMIN — PIPERACILLIN AND TAZOBACTAM 25 GRAM(S): 4; .5 INJECTION, POWDER, LYOPHILIZED, FOR SOLUTION INTRAVENOUS at 21:17

## 2023-09-15 RX ADMIN — MORPHINE SULFATE 2 MILLIGRAM(S): 50 CAPSULE, EXTENDED RELEASE ORAL at 21:30

## 2023-09-15 RX ADMIN — HEPARIN SODIUM 5000 UNIT(S): 5000 INJECTION INTRAVENOUS; SUBCUTANEOUS at 13:46

## 2023-09-15 RX ADMIN — POTASSIUM PHOSPHATE, MONOBASIC POTASSIUM PHOSPHATE, DIBASIC 83.33 MILLIMOLE(S): 236; 224 INJECTION, SOLUTION INTRAVENOUS at 07:19

## 2023-09-15 RX ADMIN — MORPHINE SULFATE 2 MILLIGRAM(S): 50 CAPSULE, EXTENDED RELEASE ORAL at 21:17

## 2023-09-15 RX ADMIN — FAMOTIDINE 20 MILLIGRAM(S): 10 INJECTION INTRAVENOUS at 06:03

## 2023-09-15 RX ADMIN — PIPERACILLIN AND TAZOBACTAM 25 GRAM(S): 4; .5 INJECTION, POWDER, LYOPHILIZED, FOR SOLUTION INTRAVENOUS at 13:45

## 2023-09-15 NOTE — PROGRESS NOTE ADULT - ASSESSMENT
Patient is a 72 yo female with hx of COPD, not on home oxygen, neuropathy presents with abdominal pain. Upon evaluation in ED, patient noted with temp of 101.8, BP of 127/66, saturating 91% on Room air with hr of 124bpm. Physical examination noted with diffuse tenderness in abdominal region with guarding. Labs significant for leukocytosis of 13, lactate of 3.5 and scr of 1.41. CT abdomen and pelvis noted for free air in peritoneum and findings for perforated acute appendicitis with fidings of 6mm gall bladder lesion and right adnexal lesion. CXR negative. Surgery consulted and patient is scheduled for urgent ex lap, abdominal washout and possible bowel resection. ICU consulted for sepsis 2/2 perforated appendicitis. Patient admitted to ICU for sepsis 2/2 acute appendicitis.     #Acute Encephalopathy   #Sepsis  #Acute perforated appendicitis  #COPD  #Neuropathy  # Elevated lactate  # Gall bladder wall lesion   # MAYRA  #RIght Adnexal Lesion      ===========Neurology===========  #Acute encephalopathy   - patient noted at baseline to be Alert and oriented x3  - presented to ED Alert and oriented x1  - unable to complete full neurological exam   - can be 2/2 Sepsis induced vs possible CVA   - unable to get CT head due to receiving IV contrast  - admit to ICU   - continue with zosyn   - trend lactate  - will give 2L LR   - urine culture negative  - blood cx, 1 set pos for gram neg rods bacteroides fragilis  - cont dextrose 5% + LR  - Improving to A&Ox3, following commands    ==========Cardiovascular=============  - No acute issues   - home meds include amlodipine and lisinopril, holding for now    ============Pulmonology===========  # Hx of COPD   - patient with hx of COPD   - not on home oxygen   - no home COPD meds  - on room air  - Chest X-ray shows same findings as previous, increased central interstitial markings.    =========GI=============  # Sepsis 2/2 Perforated acute appendicitis   - noted with findings of acute perforated appendicitis on CT scan  - Pt underwent laparoscopic appendectomy and washout  - peritoneum full of feculent pus according to surgeon, Dr. Folek  - hemodynamically stable, not requiring pressors   - IVF hydration   - continue with zosyn   - trend lactate  - urine culture negative  - blood cx, 1 set pos for gram neg rods Bacteroides fragilis  - ID consulted Dr. Moffett   - Pain control with 1 Dilaudid PRN  - Clear liquid diet    #Gallbladder wall lesion  - patient on CT noted with Focal 6 mm enhancing lesion at the gallbladder fundal wall  - Consider MR abdomen when clinically stable      ==========Renal==========  #MAYRA  - patient noted to have Scr of 1.41 on admission  - avoid nephrotoxic agents  - Cr trended down    =========Infectious Disease===========  # Sepsis 2/2 Perforated acute appendicitis   - noted with findings of acute perforated appendicitis on CT scan  - hemodynamically stable, not requiring pressors   - Clear liquid diet  - IVF hydration   - s/p diagnostic laparoscopy with appendectomy and washout  - trend lactate  - urine culture negative  - blood cx, 1 set pos for gram neg rods Bacteroides fragilis  - ID consulted Dr. Moffett   - continue with zosyn     =========Heme===========  - No acute issues   - heparin subq for DVT prophylaxis  - RLE US negative for DVT    ==========Endo==========  #Neuropathy   - home med pregabalin    ==========Skin===========  - No acute issues     =========Reproductive===========  #Right Adnexal lesion  - CT abdomen showing 5.2 cm soft tissue density structure in the right adnexa may represent an exophytic uterine fibroid, or a right ovarian mass  - f/u pelvic US when stable     ========Prophylaxis=======  - heparin subq    Code Status: Full Code    Disposition: Patient accepted and transferred to ICU  Patient is a 72 yo female with hx of COPD, not on home oxygen, neuropathy presents with abdominal pain. Upon evaluation in ED, patient noted with temp of 101.8, BP of 127/66, saturating 91% on Room air with hr of 124bpm. Physical examination noted with diffuse tenderness in abdominal region with guarding. Labs significant for leukocytosis of 13, lactate of 3.5 and scr of 1.41. CT abdomen and pelvis noted for free air in peritoneum and findings for perforated acute appendicitis with fidings of 6mm gall bladder lesion and right adnexal lesion. CXR negative. Surgery consulted and patient is scheduled for urgent ex lap, abdominal washout and possible bowel resection. ICU consulted for sepsis 2/2 perforated appendicitis. Patient admitted to ICU for sepsis 2/2 acute appendicitis.     #Acute Encephalopathy   #Sepsis  #Acute perforated appendicitis  #COPD  #Neuropathy  # Elevated lactate  # Gall bladder wall lesion   # MAYRA  #RIght Adnexal Lesion      ===========Neurology===========  #Acute encephalopathy   - patient noted at baseline to be Alert and oriented x3  - presented to ED Alert and oriented x1  - unable to complete full neurological exam   - can be 2/2 Sepsis induced vs possible CVA   - unable to get CT head due to receiving IV contrast  - admit to ICU   - continue with zosyn   - trend lactate  - will give 2L LR   - urine culture negative  - blood cx, 1 set pos for gram neg rods bacteroides fragilis  - will f/u with lab for sensitivities  - cont dextrose 5% + LR  - Improving to A&Ox3, following commands    ==========Cardiovascular=============  - No acute issues   - home meds include amlodipine and lisinopril, holding for now    ============Pulmonology===========  # Hx of COPD   - patient with hx of COPD   - not on home oxygen   - no home COPD meds  - on room air  - Chest X-ray shows same findings as previous, increased central interstitial markings.    =========GI=============  # Sepsis 2/2 Perforated acute appendicitis   - noted with findings of acute perforated appendicitis on CT scan  - Pt underwent laparoscopic appendectomy and washout  - peritoneum full of feculent pus according to surgeon, Dr. Sunshine  - hemodynamically stable, not requiring pressors   - IVF hydration   - continue with zosyn   - trend lactate  - urine culture negative  - blood cx, 1 set pos for gram neg rods Bacteroides fragilis  - E. coli in peritoneal fluid  - ID consulted Dr. Moffett   - Pain control with morphine  - Clear liquid diet    #Gallbladder wall lesion  - patient on CT noted with Focal 6 mm enhancing lesion at the gallbladder fundal wall  - Consider MR abdomen when clinically stable      ==========Renal==========  #MAYRA  - patient noted to have Scr of 1.41 on admission  - avoid nephrotoxic agents  - Cr trended down    =========Infectious Disease===========  # Sepsis 2/2 Perforated acute appendicitis   - noted with findings of acute perforated appendicitis on CT scan  - hemodynamically stable, not requiring pressors   - Clear liquid diet  - IVF hydration   - s/p diagnostic laparoscopy with appendectomy and washout  - trend lactate  - urine culture negative  - blood cx, 1 set pos for gram neg rods Bacteroides fragilis  - E. coli in peritoneal fluid  - ID consulted Dr. Moffett   - continue with zosyn   - will f/u for sensitivities    =========Heme===========  - No acute issues   - heparin subq for DVT prophylaxis  - RLE US negative for DVT    ==========Endo==========  #Neuropathy   - home med pregabalin    ==========Skin===========  - No acute issues     =========Reproductive===========  #Right Adnexal lesion  - CT abdomen showing 5.2 cm soft tissue density structure in the right adnexa may represent an exophytic uterine fibroid, or a right ovarian mass  - f/u pelvic US when stable     ========Prophylaxis=======  - heparin subq    Code Status: Full Code    Disposition: Patient accepted and transferred to ICU

## 2023-09-15 NOTE — PROGRESS NOTE ADULT - SUBJECTIVE AND OBJECTIVE BOX
No acute overnight, patient alert answering questions, pain controlled, passing gas and BM. Voiding with external cath, one episode of tachy. Blood pressure stable.     ICU Vital Signs Last 24 Hrs  T(C): 36.1 (15 Sep 2023 04:00), Max: 37 (14 Sep 2023 20:00)  T(F): 97 (15 Sep 2023 04:00), Max: 98.6 (14 Sep 2023 20:00)  HR: 87 (15 Sep 2023 07:00) (75 - 110)  BP: 101/47 (15 Sep 2023 07:00) (101/47 - 165/65)  BP(mean): 63 (15 Sep 2023 07:00) (63 - 111)  RR: 26 (15 Sep 2023 07:00) (19 - 34)  SpO2: 96% (15 Sep 2023 07:00) (88% - 98%)    O2 Parameters below as of 15 Sep 2023 07:00  Patient On (Oxygen Delivery Method): nasal cannula  O2 Flow (L/min): 2    I&O's Detail    14 Sep 2023 07:01  -  15 Sep 2023 07:00  --------------------------------------------------------  IN:    IV PiggyBack: 100 mL    IV PiggyBack: 333.2 mL    IV PiggyBack: 150 mL  Total IN: 583.2 mL    OUT:    Bulb (mL): 60 mL    Bulb (mL): 75 mL    Indwelling Catheter - Urethral (mL): 425 mL    Voided (mL): 350 mL  Total OUT: 910 mL    Total NET: -326.8 mL    UOP 75    CONSTITUTIONAL: alert and answering questions.  RESP: No respiratory distress, no use of accessory muscles  CV: RRR  GI: Soft, mild Tenderness, ND, no rebound, no guarding; incisions C/D/I, drains in place SS output.                               10.9   13.55 )-----------( 230      ( 15 Sep 2023 04:50 )             33.1         09-15    141  |  108  |  6<L>  ----------------------------<  88  3.4<L>   |  28  |  0.54    Ca    7.8<L>      15 Sep 2023 04:50  Phos  1.6     09-15  Mg     2.0     09-15    TPro  5.6<L>  /  Alb  2.0<L>  /  TBili  0.6  /  DBili  x   /  AST  21  /  ALT  22  /  AlkPhos  60  09-15

## 2023-09-15 NOTE — PROGRESS NOTE ADULT - SUBJECTIVE AND OBJECTIVE BOX
Reason for Admission:  · Reason for Admission	Sepsis 2/2 Perforated Appendicitis      · Subjective and Objective:   INTERVAL HPI/OVERNIGHT EVENTS:  No acute events overnight.  Patient examined at bedside this AM.  MARY LOU drains with serous fluid draining well. Patient endorses pain around incision sites. Repeat blood cultures have shown no growth to date.    PRESSORS: [ ] YES [X] NO    Antimicrobial:  piperacillin/tazobactam IVPB.. 3.375 Gram(s) IV Intermittent every 8 hours    Cardiovascular:    Pulmonary:    Hematalogic:  heparin   Injectable 5000 Unit(s) SubCutaneous every 8 hours    Other:  chlorhexidine 2% Cloths 1 Application(s) Topical <User Schedule>  famotidine    Tablet 20 milliGRAM(s) Oral daily  morphine  - Injectable 2 milliGRAM(s) IV Push every 6 hours PRN  pregabalin 150 milliGRAM(s) Oral three times a day    chlorhexidine 2% Cloths 1 Application(s) Topical <User Schedule>  famotidine    Tablet 20 milliGRAM(s) Oral daily  heparin   Injectable 5000 Unit(s) SubCutaneous every 8 hours  morphine  - Injectable 2 milliGRAM(s) IV Push every 6 hours PRN  piperacillin/tazobactam IVPB.. 3.375 Gram(s) IV Intermittent every 8 hours  pregabalin 150 milliGRAM(s) Oral three times a day    Drug Dosing Weight  Height (cm): 170.1 (14 Sep 2023 11:19)  Weight (kg): 69.3 (11 Sep 2023 21:45)  BMI (kg/m2): 24 (14 Sep 2023 11:19)  BSA (m2): 1.8 (14 Sep 2023 11:19)    CENTRAL LINE: [ ] YES [X] NO  LOCATION:   DATE INSERTED:  REMOVE: [ ] YES [ ] NO  EXPLAIN:    LINARES: [ ] YES [X] NO    DATE INSERTED:  REMOVE:  [ ] YES [ ] NO  EXPLAIN:    A-LINE:  [ ] YES [X] NO  LOCATION:   DATE INSERTED:  REMOVE:  [ ] YES [ ] NO  EXPLAIN:    PMH -reviewed admission note, no change since admission  PAST MEDICAL & SURGICAL HISTORY:  HTN (hypertension)      HLD (hyperlipidemia)      H/O neuropathy      History of COPD          ICU Vital Signs Last 24 Hrs  T(C): 36.1 (15 Sep 2023 04:00), Max: 37 (14 Sep 2023 20:00)  T(F): 97 (15 Sep 2023 04:00), Max: 98.6 (14 Sep 2023 20:00)  HR: 87 (15 Sep 2023 07:00) (75 - 110)  BP: 101/47 (15 Sep 2023 07:00) (101/47 - 165/65)  BP(mean): 63 (15 Sep 2023 07:00) (63 - 111)  ABP: --  ABP(mean): --  RR: 26 (15 Sep 2023 07:00) (19 - 34)  SpO2: 96% (15 Sep 2023 07:00) (88% - 98%)    O2 Parameters below as of 15 Sep 2023 07:00  Patient On (Oxygen Delivery Method): nasal cannula  O2 Flow (L/min): 2                09-14 @ 07:01  -  09-15 @ 07:00  --------------------------------------------------------  IN: 583.2 mL / OUT: 910 mL / NET: -326.8 mL            PHYSICAL EXAM:    GENERAL: NAD, well-developed, urinating with purewick  HEAD:  Atraumatic, Normocephalic  EYES: EOMI, PERRLA, conjunctiva and sclera clear  ENMT: Moist mucous membranes  NECK: Supple, normal appearance, No JVD; Normal thyroid; Trachea midline  NERVOUS SYSTEM:  Alert & Oriented X3,  Moving all extremities  CHEST/LUNG: Clear to Auscultation; No rales, rhonchi, wheezing   HEART: Regular rate and rhythm; No murmurs, rubs, or gallops  ABDOMEN: 2 MARY LOU drains with serous fluid. Soft, Nontender, Nondistended; Bowel sounds present  EXTREMITIES:  2+ Peripheral Pulses, No clubbing, cyanosis, or edema  SKIN: No rashes or lesions;  Good capillary refill      LABS:  CBC Full  -  ( 15 Sep 2023 04:50 )  WBC Count : 13.55 K/uL  RBC Count : 3.82 M/uL  Hemoglobin : 10.9 g/dL  Hematocrit : 33.1 %  Platelet Count - Automated : 230 K/uL  Mean Cell Volume : 86.6 fl  Mean Cell Hemoglobin : 28.5 pg  Mean Cell Hemoglobin Concentration : 32.9 gm/dL  Auto Neutrophil # : 10.43 K/uL  Auto Lymphocyte # : 2.17 K/uL  Auto Monocyte # : 0.27 K/uL  Auto Eosinophil # : 0.68 K/uL  Auto Basophil # : 0.00 K/uL  Auto Neutrophil % : 77.0 %  Auto Lymphocyte % : 16.0 %  Auto Monocyte % : 2.0 %  Auto Eosinophil % : 5.0 %  Auto Basophil % : 0.0 %    09-15    141  |  108  |  6<L>  ----------------------------<  88  3.4<L>   |  28  |  0.54    Ca    7.8<L>      15 Sep 2023 04:50  Phos  1.6     09-15  Mg     2.0     09-15    TPro  5.6<L>  /  Alb  2.0<L>  /  TBili  0.6  /  DBili  x   /  AST  21  /  ALT  22  /  AlkPhos  60  09-15      Urinalysis Basic - ( 15 Sep 2023 04:50 )    Color: x / Appearance: x / SG: x / pH: x  Gluc: 88 mg/dL / Ketone: x  / Bili: x / Urobili: x   Blood: x / Protein: x / Nitrite: x   Leuk Esterase: x / RBC: x / WBC x   Sq Epi: x / Non Sq Epi: x / Bacteria: x      Culture Results:   No growth at 24 hours (09-13 @ 03:59)  Culture Results:   No growth at 24 hours (09-13 @ 03:50)      RADIOLOGY & ADDITIONAL STUDIES REVIEWED:  ***    [ ]GOALS OF CARE DISCUSSION WITH PATIENT/FAMILY/PROXY:    CRITICAL CARE TIME SPENT: 35 minutes      Assessment and Plan:   · Assessment	  Patient is a 74 yo female with hx of COPD, not on home oxygen, neuropathy presents with abdominal pain. Upon evaluation in ED, patient noted with temp of 101.8, BP of 127/66, saturating 91% on Room air with hr of 124bpm. Physical examination noted with diffuse tenderness in abdominal region with guarding. Labs significant for leukocytosis of 13, lactate of 3.5 and scr of 1.41. CT abdomen and pelvis noted for free air in peritoneum and findings for perforated acute appendicitis with fidings of 6mm gall bladder lesion and right adnexal lesion. CXR negative. Surgery consulted and patient is scheduled for urgent ex lap, abdominal washout and possible bowel resection. ICU consulted for sepsis 2/2 perforated appendicitis. Patient admitted to ICU for sepsis 2/2 acute appendicitis.     #Acute Encephalopathy   #Sepsis  #Acute perforated appendicitis  #COPD  #Neuropathy  # Elevated lactate  # Gall bladder wall lesion   # MAYRA  #RIght Adnexal Lesion      ===========Neurology===========  #Acute encephalopathy   - patient noted at baseline to be Alert and oriented x3  - presented to ED Alert and oriented x1  - unable to complete full neurological exam   - can be 2/2 Sepsis induced vs possible CVA   - unable to get CT head due to receiving IV contrast  - admit to ICU   - continue with zosyn   - trend lactate  - will give 2L LR   - urine culture negative  - blood cx, 1 set pos for gram neg rods bacteroides fragilis  - will f/u with lab for sensitivities  - cont dextrose 5% + LR  - Improving to A&Ox3, following commands    ==========Cardiovascular=============  - No acute issues   - home meds include amlodipine and lisinopril, holding for now    ============Pulmonology===========  # Hx of COPD   - patient with hx of COPD   - not on home oxygen   - no home COPD meds  - on room air  - Chest X-ray shows same findings as previous, increased central interstitial markings.    =========GI=============  # Sepsis 2/2 Perforated acute appendicitis   - noted with findings of acute perforated appendicitis on CT scan  - Pt underwent laparoscopic appendectomy and washout  - peritoneum full of feculent pus according to surgeon, Dr. Sunshine  - hemodynamically stable, not requiring pressors   - IVF hydration   - continue with zosyn   - trend lactate  - urine culture negative  - blood cx, 1 set pos for gram neg rods Bacteroides fragilis  - E. coli in peritoneal fluid  - ID consulted Dr. Moffett   - Pain control with morphine  - Clear liquid diet    #Gallbladder wall lesion  - patient on CT noted with Focal 6 mm enhancing lesion at the gallbladder fundal wall  - Consider MR abdomen when clinically stable      ==========Renal==========  #MAYRA  - patient noted to have Scr of 1.41 on admission  - avoid nephrotoxic agents  - Cr trended down    =========Infectious Disease===========  # Sepsis 2/2 Perforated acute appendicitis   - noted with findings of acute perforated appendicitis on CT scan  - hemodynamically stable, not requiring pressors   - Clear liquid diet  - IVF hydration   - s/p diagnostic laparoscopy with appendectomy and washout  - trend lactate  - urine culture negative  - blood cx, 1 set pos for gram neg rods Bacteroides fragilis  - E. coli in peritoneal fluid  - ID consulted Dr. Moffett   - continue with zosyn   - will f/u for sensitivities    =========Heme===========  - No acute issues   - heparin subq for DVT prophylaxis  - RLE US negative for DVT    ==========Endo==========  #Neuropathy   - home med pregabalin    ==========Skin===========  - No acute issues     =========Reproductive===========  #Right Adnexal lesion  - CT abdomen showing 5.2 cm soft tissue density structure in the right adnexa may represent an exophytic uterine fibroid, or a right ovarian mass  - f/u pelvic US when stable     ========Prophylaxis=======  - heparin subq

## 2023-09-15 NOTE — PROGRESS NOTE ADULT - SUBJECTIVE AND OBJECTIVE BOX
INTERVAL HPI/OVERNIGHT EVENTS: ***    PRESSORS: [ ] YES [ ] NO  WHICH:    Antimicrobial:  piperacillin/tazobactam IVPB.. 3.375 Gram(s) IV Intermittent every 8 hours    Cardiovascular:    Pulmonary:    Hematalogic:  heparin   Injectable 5000 Unit(s) SubCutaneous every 8 hours    Other:  chlorhexidine 2% Cloths 1 Application(s) Topical <User Schedule>  famotidine    Tablet 20 milliGRAM(s) Oral daily  morphine  - Injectable 2 milliGRAM(s) IV Push every 6 hours PRN  pregabalin 150 milliGRAM(s) Oral three times a day    chlorhexidine 2% Cloths 1 Application(s) Topical <User Schedule>  famotidine    Tablet 20 milliGRAM(s) Oral daily  heparin   Injectable 5000 Unit(s) SubCutaneous every 8 hours  morphine  - Injectable 2 milliGRAM(s) IV Push every 6 hours PRN  piperacillin/tazobactam IVPB.. 3.375 Gram(s) IV Intermittent every 8 hours  pregabalin 150 milliGRAM(s) Oral three times a day    Drug Dosing Weight  Height (cm): 170.1 (14 Sep 2023 11:19)  Weight (kg): 69.3 (11 Sep 2023 21:45)  BMI (kg/m2): 24 (14 Sep 2023 11:19)  BSA (m2): 1.8 (14 Sep 2023 11:19)    CENTRAL LINE: [ ] YES [ ] NO  LOCATION:   DATE INSERTED:  REMOVE: [ ] YES [ ] NO  EXPLAIN:    LINARES: [ ] YES [ ] NO    DATE INSERTED:  REMOVE:  [ ] YES [ ] NO  EXPLAIN:    A-LINE:  [ ] YES [ ] NO  LOCATION:   DATE INSERTED:  REMOVE:  [ ] YES [ ] NO  EXPLAIN:    PMH -reviewed admission note, no change since admission  PAST MEDICAL & SURGICAL HISTORY:  HTN (hypertension)      HLD (hyperlipidemia)      H/O neuropathy      History of COPD          ICU Vital Signs Last 24 Hrs  T(C): 36.1 (15 Sep 2023 04:00), Max: 37 (14 Sep 2023 20:00)  T(F): 97 (15 Sep 2023 04:00), Max: 98.6 (14 Sep 2023 20:00)  HR: 87 (15 Sep 2023 07:00) (75 - 110)  BP: 101/47 (15 Sep 2023 07:00) (101/47 - 165/65)  BP(mean): 63 (15 Sep 2023 07:00) (63 - 111)  ABP: --  ABP(mean): --  RR: 26 (15 Sep 2023 07:00) (19 - 34)  SpO2: 96% (15 Sep 2023 07:00) (88% - 98%)    O2 Parameters below as of 15 Sep 2023 07:00  Patient On (Oxygen Delivery Method): nasal cannula  O2 Flow (L/min): 2                09-14 @ 07:01  -  09-15 @ 07:00  --------------------------------------------------------  IN: 583.2 mL / OUT: 910 mL / NET: -326.8 mL            PHYSICAL EXAM:    GENERAL: NAD, well-developed  HEAD:  Atraumatic, Normocephalic  EYES: EOMI, PERRLA, conjunctiva and sclera clear  ENMT: Moist mucous membranes  NECK: Supple, normal appearance, No JVD; Normal thyroid; Trachea midline  NERVOUS SYSTEM:  Alert & Oriented X3,  Motor Strength 5/5 B/L upper and lower extremities; DTRs 2+ intact and symmetric  CHEST/LUNG: No chest deformity; Clear to Auscultation; No rales, rhonchi, wheezing   HEART: Regular rate and rhythm; No murmurs, rubs, or gallops  ABDOMEN: Soft, Nontender, Nondistended; Bowel sounds present  EXTREMITIES:  2+ Peripheral Pulses, No clubbing, cyanosis, or edema  SKIN: No rashes or lesions;  Good capillary refill      LABS:  CBC Full  -  ( 15 Sep 2023 04:50 )  WBC Count : 13.55 K/uL  RBC Count : 3.82 M/uL  Hemoglobin : 10.9 g/dL  Hematocrit : 33.1 %  Platelet Count - Automated : 230 K/uL  Mean Cell Volume : 86.6 fl  Mean Cell Hemoglobin : 28.5 pg  Mean Cell Hemoglobin Concentration : 32.9 gm/dL  Auto Neutrophil # : 10.43 K/uL  Auto Lymphocyte # : 2.17 K/uL  Auto Monocyte # : 0.27 K/uL  Auto Eosinophil # : 0.68 K/uL  Auto Basophil # : 0.00 K/uL  Auto Neutrophil % : 77.0 %  Auto Lymphocyte % : 16.0 %  Auto Monocyte % : 2.0 %  Auto Eosinophil % : 5.0 %  Auto Basophil % : 0.0 %    09-15    141  |  108  |  6<L>  ----------------------------<  88  3.4<L>   |  28  |  0.54    Ca    7.8<L>      15 Sep 2023 04:50  Phos  1.6     09-15  Mg     2.0     09-15    TPro  5.6<L>  /  Alb  2.0<L>  /  TBili  0.6  /  DBili  x   /  AST  21  /  ALT  22  /  AlkPhos  60  09-15      Urinalysis Basic - ( 15 Sep 2023 04:50 )    Color: x / Appearance: x / SG: x / pH: x  Gluc: 88 mg/dL / Ketone: x  / Bili: x / Urobili: x   Blood: x / Protein: x / Nitrite: x   Leuk Esterase: x / RBC: x / WBC x   Sq Epi: x / Non Sq Epi: x / Bacteria: x      Culture Results:   No growth at 24 hours (09-13 @ 03:59)  Culture Results:   No growth at 24 hours (09-13 @ 03:50)      RADIOLOGY & ADDITIONAL STUDIES REVIEWED:  ***    [ ]GOALS OF CARE DISCUSSION WITH PATIENT/FAMILY/PROXY:    CRITICAL CARE TIME SPENT: 35 minutes INTERVAL HPI/OVERNIGHT EVENTS:  No acute events overnight.  Patient examined at bedside this AM.  MARY LOU drains with serous fluid draining well. Patient endorses pain around incision sites. Repeat blood cultures have shown no growth to date.    PRESSORS: [ ] YES [X] NO    Antimicrobial:  piperacillin/tazobactam IVPB.. 3.375 Gram(s) IV Intermittent every 8 hours    Cardiovascular:    Pulmonary:    Hematalogic:  heparin   Injectable 5000 Unit(s) SubCutaneous every 8 hours    Other:  chlorhexidine 2% Cloths 1 Application(s) Topical <User Schedule>  famotidine    Tablet 20 milliGRAM(s) Oral daily  morphine  - Injectable 2 milliGRAM(s) IV Push every 6 hours PRN  pregabalin 150 milliGRAM(s) Oral three times a day    chlorhexidine 2% Cloths 1 Application(s) Topical <User Schedule>  famotidine    Tablet 20 milliGRAM(s) Oral daily  heparin   Injectable 5000 Unit(s) SubCutaneous every 8 hours  morphine  - Injectable 2 milliGRAM(s) IV Push every 6 hours PRN  piperacillin/tazobactam IVPB.. 3.375 Gram(s) IV Intermittent every 8 hours  pregabalin 150 milliGRAM(s) Oral three times a day    Drug Dosing Weight  Height (cm): 170.1 (14 Sep 2023 11:19)  Weight (kg): 69.3 (11 Sep 2023 21:45)  BMI (kg/m2): 24 (14 Sep 2023 11:19)  BSA (m2): 1.8 (14 Sep 2023 11:19)    CENTRAL LINE: [ ] YES [X] NO  LOCATION:   DATE INSERTED:  REMOVE: [ ] YES [ ] NO  EXPLAIN:    LINARES: [ ] YES [X] NO    DATE INSERTED:  REMOVE:  [ ] YES [ ] NO  EXPLAIN:    A-LINE:  [ ] YES [X] NO  LOCATION:   DATE INSERTED:  REMOVE:  [ ] YES [ ] NO  EXPLAIN:    PMH -reviewed admission note, no change since admission  PAST MEDICAL & SURGICAL HISTORY:  HTN (hypertension)      HLD (hyperlipidemia)      H/O neuropathy      History of COPD          ICU Vital Signs Last 24 Hrs  T(C): 36.1 (15 Sep 2023 04:00), Max: 37 (14 Sep 2023 20:00)  T(F): 97 (15 Sep 2023 04:00), Max: 98.6 (14 Sep 2023 20:00)  HR: 87 (15 Sep 2023 07:00) (75 - 110)  BP: 101/47 (15 Sep 2023 07:00) (101/47 - 165/65)  BP(mean): 63 (15 Sep 2023 07:00) (63 - 111)  ABP: --  ABP(mean): --  RR: 26 (15 Sep 2023 07:00) (19 - 34)  SpO2: 96% (15 Sep 2023 07:00) (88% - 98%)    O2 Parameters below as of 15 Sep 2023 07:00  Patient On (Oxygen Delivery Method): nasal cannula  O2 Flow (L/min): 2                09-14 @ 07:01  -  09-15 @ 07:00  --------------------------------------------------------  IN: 583.2 mL / OUT: 910 mL / NET: -326.8 mL            PHYSICAL EXAM:    GENERAL: NAD, well-developed, urinating with purewick  HEAD:  Atraumatic, Normocephalic  EYES: EOMI, PERRLA, conjunctiva and sclera clear  ENMT: Moist mucous membranes  NECK: Supple, normal appearance, No JVD; Normal thyroid; Trachea midline  NERVOUS SYSTEM:  Alert & Oriented X3,  Moving all extremities  CHEST/LUNG: Clear to Auscultation; No rales, rhonchi, wheezing   HEART: Regular rate and rhythm; No murmurs, rubs, or gallops  ABDOMEN: 2 MARY LOU drains with serous fluid. Soft, Nontender, Nondistended; Bowel sounds present  EXTREMITIES:  2+ Peripheral Pulses, No clubbing, cyanosis, or edema  SKIN: No rashes or lesions;  Good capillary refill      LABS:  CBC Full  -  ( 15 Sep 2023 04:50 )  WBC Count : 13.55 K/uL  RBC Count : 3.82 M/uL  Hemoglobin : 10.9 g/dL  Hematocrit : 33.1 %  Platelet Count - Automated : 230 K/uL  Mean Cell Volume : 86.6 fl  Mean Cell Hemoglobin : 28.5 pg  Mean Cell Hemoglobin Concentration : 32.9 gm/dL  Auto Neutrophil # : 10.43 K/uL  Auto Lymphocyte # : 2.17 K/uL  Auto Monocyte # : 0.27 K/uL  Auto Eosinophil # : 0.68 K/uL  Auto Basophil # : 0.00 K/uL  Auto Neutrophil % : 77.0 %  Auto Lymphocyte % : 16.0 %  Auto Monocyte % : 2.0 %  Auto Eosinophil % : 5.0 %  Auto Basophil % : 0.0 %    09-15    141  |  108  |  6<L>  ----------------------------<  88  3.4<L>   |  28  |  0.54    Ca    7.8<L>      15 Sep 2023 04:50  Phos  1.6     09-15  Mg     2.0     09-15    TPro  5.6<L>  /  Alb  2.0<L>  /  TBili  0.6  /  DBili  x   /  AST  21  /  ALT  22  /  AlkPhos  60  09-15      Urinalysis Basic - ( 15 Sep 2023 04:50 )    Color: x / Appearance: x / SG: x / pH: x  Gluc: 88 mg/dL / Ketone: x  / Bili: x / Urobili: x   Blood: x / Protein: x / Nitrite: x   Leuk Esterase: x / RBC: x / WBC x   Sq Epi: x / Non Sq Epi: x / Bacteria: x      Culture Results:   No growth at 24 hours (09-13 @ 03:59)  Culture Results:   No growth at 24 hours (09-13 @ 03:50)      RADIOLOGY & ADDITIONAL STUDIES REVIEWED:  ***    [ ]GOALS OF CARE DISCUSSION WITH PATIENT/FAMILY/PROXY:    CRITICAL CARE TIME SPENT: 35 minutes

## 2023-09-15 NOTE — PROGRESS NOTE ADULT - ASSESSMENT
76-year-old-female presented with sepsis/bacteremia due to perforated appendicitis, s/p lap appendectomy and washout and 2 MARY LOU drain placement. Transferred back to SICU for resuscitation and monitoring.   HDS, improving neurological status and UOP     Continue SICU care  Replace lytes PRN  Strict I/O, UOP > 0.5cc/kg/h, IVFm + PRN  Clears, advance to solids  MARY LOU care, dressing changes PRN  IS - Sat 89-93 COPD   ABX by ID (Bacteremia, peritonitis - perf appy, RUL PNA

## 2023-09-16 LAB
ALBUMIN SERPL ELPH-MCNC: 1.9 G/DL — LOW (ref 3.5–5)
ALP SERPL-CCNC: 83 U/L — SIGNIFICANT CHANGE UP (ref 40–120)
ALT FLD-CCNC: 20 U/L DA — SIGNIFICANT CHANGE UP (ref 10–60)
ANION GAP SERPL CALC-SCNC: 5 MMOL/L — SIGNIFICANT CHANGE UP (ref 5–17)
AST SERPL-CCNC: 17 U/L — SIGNIFICANT CHANGE UP (ref 10–40)
BASOPHILS # BLD AUTO: 0.1 K/UL — SIGNIFICANT CHANGE UP (ref 0–0.2)
BASOPHILS NFR BLD AUTO: 0.7 % — SIGNIFICANT CHANGE UP (ref 0–2)
BILIRUB SERPL-MCNC: 0.6 MG/DL — SIGNIFICANT CHANGE UP (ref 0.2–1.2)
BUN SERPL-MCNC: 7 MG/DL — SIGNIFICANT CHANGE UP (ref 7–18)
CALCIUM SERPL-MCNC: 7.9 MG/DL — LOW (ref 8.4–10.5)
CHLORIDE SERPL-SCNC: 108 MMOL/L — SIGNIFICANT CHANGE UP (ref 96–108)
CO2 SERPL-SCNC: 28 MMOL/L — SIGNIFICANT CHANGE UP (ref 22–31)
CREAT SERPL-MCNC: 0.56 MG/DL — SIGNIFICANT CHANGE UP (ref 0.5–1.3)
EGFR: 96 ML/MIN/1.73M2 — SIGNIFICANT CHANGE UP
EOSINOPHIL # BLD AUTO: 0.14 K/UL — SIGNIFICANT CHANGE UP (ref 0–0.5)
EOSINOPHIL NFR BLD AUTO: 1 % — SIGNIFICANT CHANGE UP (ref 0–6)
GLUCOSE SERPL-MCNC: 93 MG/DL — SIGNIFICANT CHANGE UP (ref 70–99)
HCT VFR BLD CALC: 35.7 % — SIGNIFICANT CHANGE UP (ref 34.5–45)
HGB BLD-MCNC: 11.7 G/DL — SIGNIFICANT CHANGE UP (ref 11.5–15.5)
IMM GRANULOCYTES NFR BLD AUTO: 6.8 % — HIGH (ref 0–0.9)
LYMPHOCYTES # BLD AUTO: 2.9 K/UL — SIGNIFICANT CHANGE UP (ref 1–3.3)
LYMPHOCYTES # BLD AUTO: 20.8 % — SIGNIFICANT CHANGE UP (ref 13–44)
MAGNESIUM SERPL-MCNC: 2 MG/DL — SIGNIFICANT CHANGE UP (ref 1.6–2.6)
MCHC RBC-ENTMCNC: 28.8 PG — SIGNIFICANT CHANGE UP (ref 27–34)
MCHC RBC-ENTMCNC: 32.8 GM/DL — SIGNIFICANT CHANGE UP (ref 32–36)
MCV RBC AUTO: 87.9 FL — SIGNIFICANT CHANGE UP (ref 80–100)
MONOCYTES # BLD AUTO: 1.01 K/UL — HIGH (ref 0–0.9)
MONOCYTES NFR BLD AUTO: 7.2 % — SIGNIFICANT CHANGE UP (ref 2–14)
NEUTROPHILS # BLD AUTO: 8.87 K/UL — HIGH (ref 1.8–7.4)
NEUTROPHILS NFR BLD AUTO: 63.5 % — SIGNIFICANT CHANGE UP (ref 43–77)
NRBC # BLD: 0 /100 WBCS — SIGNIFICANT CHANGE UP (ref 0–0)
PHOSPHATE SERPL-MCNC: 2.1 MG/DL — LOW (ref 2.5–4.5)
PLATELET # BLD AUTO: 272 K/UL — SIGNIFICANT CHANGE UP (ref 150–400)
POTASSIUM SERPL-MCNC: 3.7 MMOL/L — SIGNIFICANT CHANGE UP (ref 3.5–5.3)
POTASSIUM SERPL-SCNC: 3.7 MMOL/L — SIGNIFICANT CHANGE UP (ref 3.5–5.3)
PROT SERPL-MCNC: 5.7 G/DL — LOW (ref 6–8.3)
RBC # BLD: 4.06 M/UL — SIGNIFICANT CHANGE UP (ref 3.8–5.2)
RBC # FLD: 14.4 % — SIGNIFICANT CHANGE UP (ref 10.3–14.5)
SODIUM SERPL-SCNC: 141 MMOL/L — SIGNIFICANT CHANGE UP (ref 135–145)
WBC # BLD: 13.97 K/UL — HIGH (ref 3.8–10.5)
WBC # FLD AUTO: 13.97 K/UL — HIGH (ref 3.8–10.5)

## 2023-09-16 RX ORDER — POTASSIUM PHOSPHATE, MONOBASIC POTASSIUM PHOSPHATE, DIBASIC 236; 224 MG/ML; MG/ML
30 INJECTION, SOLUTION INTRAVENOUS ONCE
Refills: 0 | Status: COMPLETED | OUTPATIENT
Start: 2023-09-16 | End: 2023-09-16

## 2023-09-16 RX ORDER — FAMOTIDINE 10 MG/ML
20 INJECTION INTRAVENOUS DAILY
Refills: 0 | Status: DISCONTINUED | OUTPATIENT
Start: 2023-09-16 | End: 2023-09-22

## 2023-09-16 RX ORDER — PHENAZOPYRIDINE HCL 100 MG
100 TABLET ORAL EVERY 8 HOURS
Refills: 0 | Status: DISCONTINUED | OUTPATIENT
Start: 2023-09-16 | End: 2023-09-22

## 2023-09-16 RX ADMIN — Medication 100 MILLIGRAM(S): at 21:31

## 2023-09-16 RX ADMIN — HEPARIN SODIUM 5000 UNIT(S): 5000 INJECTION INTRAVENOUS; SUBCUTANEOUS at 21:30

## 2023-09-16 RX ADMIN — HEPARIN SODIUM 5000 UNIT(S): 5000 INJECTION INTRAVENOUS; SUBCUTANEOUS at 13:45

## 2023-09-16 RX ADMIN — Medication 100 MILLIGRAM(S): at 13:45

## 2023-09-16 RX ADMIN — PIPERACILLIN AND TAZOBACTAM 25 GRAM(S): 4; .5 INJECTION, POWDER, LYOPHILIZED, FOR SOLUTION INTRAVENOUS at 05:28

## 2023-09-16 RX ADMIN — FAMOTIDINE 20 MILLIGRAM(S): 10 INJECTION INTRAVENOUS at 03:19

## 2023-09-16 RX ADMIN — Medication 150 MILLIGRAM(S): at 05:27

## 2023-09-16 RX ADMIN — HEPARIN SODIUM 5000 UNIT(S): 5000 INJECTION INTRAVENOUS; SUBCUTANEOUS at 05:27

## 2023-09-16 RX ADMIN — MORPHINE SULFATE 2 MILLIGRAM(S): 50 CAPSULE, EXTENDED RELEASE ORAL at 06:30

## 2023-09-16 RX ADMIN — Medication 30 MILLILITER(S): at 11:19

## 2023-09-16 RX ADMIN — PIPERACILLIN AND TAZOBACTAM 25 GRAM(S): 4; .5 INJECTION, POWDER, LYOPHILIZED, FOR SOLUTION INTRAVENOUS at 21:30

## 2023-09-16 RX ADMIN — Medication 150 MILLIGRAM(S): at 21:30

## 2023-09-16 RX ADMIN — Medication 150 MILLIGRAM(S): at 13:45

## 2023-09-16 RX ADMIN — CHLORHEXIDINE GLUCONATE 1 APPLICATION(S): 213 SOLUTION TOPICAL at 05:28

## 2023-09-16 RX ADMIN — POTASSIUM PHOSPHATE, MONOBASIC POTASSIUM PHOSPHATE, DIBASIC 83.33 MILLIMOLE(S): 236; 224 INJECTION, SOLUTION INTRAVENOUS at 05:27

## 2023-09-16 RX ADMIN — PIPERACILLIN AND TAZOBACTAM 25 GRAM(S): 4; .5 INJECTION, POWDER, LYOPHILIZED, FOR SOLUTION INTRAVENOUS at 13:44

## 2023-09-16 RX ADMIN — MORPHINE SULFATE 2 MILLIGRAM(S): 50 CAPSULE, EXTENDED RELEASE ORAL at 06:55

## 2023-09-16 NOTE — PROGRESS NOTE ADULT - ASSESSMENT
· Assessment    Patient is a 72 yo female with hx of COPD, not on home oxygen, neuropathy presents with abdominal pain. Upon evaluation in ED, patient noted with temp of 101.8, BP of 127/66, saturating 91% on Room air with hr of 124bpm. Physical examination noted with diffuse tenderness in abdominal region with guarding. Labs significant for leukocytosis of 13, lactate of 3.5 and scr of 1.41. CT abdomen and pelvis noted for free air in peritoneum and findings for perforated acute appendicitis with fidings of 6mm gall bladder lesion and right adnexal lesion. CXR negative. Surgery consulted and patient is scheduled for urgent ex lap, abdominal washout and possible bowel resection. ICU consulted for sepsis 2/2 perforated appendicitis. Patient admitted to ICU for sepsis 2/2 acute appendicitis.     #Acute Encephalopathy   #Sepsis  #Acute perforated appendicitis  #COPD  #Neuropathy  # Elevated lactate  # Gall bladder wall lesion   # MAYRA  #RIght Adnexal Lesion      ===========Neurology===========  #Acute encephalopathy   - patient noted at baseline to be Alert and oriented x3  - presented to ED Alert and oriented x1  - unable to complete full neurological exam   - can be 2/2 Sepsis induced vs possible CVA   - unable to get CT head due to receiving IV contrast  - admit to ICU   - continue with zosyn   - trend lactate  - will give 2L LR   - urine culture negative  - blood cx, 1 set pos for gram neg rods bacteroides fragilis  - will f/u with lab for sensitivities  - cont dextrose 5% + LR  - Improving to A&Ox3, following commands    ==========Cardiovascular=============  - No acute issues   - home meds include amlodipine and lisinopril, holding for now    ============Pulmonology===========  # Hx of COPD   - patient with hx of COPD   - not on home oxygen   - no home COPD meds  - on room air  - Chest X-ray shows same findings as previous, increased central interstitial markings.    =========GI=============  # Sepsis 2/2 Perforated acute appendicitis   - noted with findings of acute perforated appendicitis on CT scan  - Pt underwent laparoscopic appendectomy and washout  - peritoneum full of feculent pus according to surgeon, Dr. Sunshine  - hemodynamically stable, not requiring pressors   - IVF hydration   - continue with zosyn   - trend lactate  - urine culture negative  - blood cx, 1 set pos for gram neg rods Bacteroides fragilis  - E. coli in peritoneal fluid  - ID consulted Dr. Moffett   - Pain control with morphine  - Clear liquid diet    #Gallbladder wall lesion  - patient on CT noted with Focal 6 mm enhancing lesion at the gallbladder fundal wall  - Consider MR abdomen when clinically stable      ==========Renal==========  #MAYRA  - patient noted to have Scr of 1.41 on admission  - avoid nephrotoxic agents  - Cr trended down    =========Infectious Disease===========  # Sepsis 2/2 Perforated acute appendicitis   - noted with findings of acute perforated appendicitis on CT scan  - hemodynamically stable, not requiring pressors   - Clear liquid diet  - IVF hydration   - s/p diagnostic laparoscopy with appendectomy and washout  - trend lactate  - urine culture negative  - blood cx, 1 set pos for gram neg rods Bacteroides fragilis  - E. coli in peritoneal fluid  - ID consulted Dr. Moffett   - continue with zosyn   - will f/u for sensitivities    =========Heme===========  - No acute issues   - heparin subq for DVT prophylaxis  - RLE US negative for DVT    ==========Endo==========  #Neuropathy   - home med pregabalin    ==========Skin===========  - No acute issues     =========Reproductive===========  #Right Adnexal lesion  - CT abdomen showing 5.2 cm soft tissue density structure in the right adnexa may represent an exophytic uterine fibroid, or a right ovarian mass  - f/u pelvic US when stable     ========Prophylaxis=======  - heparin subq    Code Status: Full Code    Disposition: Patient accepted and transferred to ICU    Assessment    Patient is a 74 yo female with hx of COPD, not on home oxygen, neuropathy presents with abdominal pain. Upon evaluation in ED, patient noted with temp of 101.8F, BP of 127/66, saturating 91% on Room air with hr of 124bpm. Physical examination noted with diffuse tenderness in abdominal region with guarding. Labs significant for leukocytosis of 13, lactate of 3.5 and scr of 1.41. CT abdomen and pelvis noted for free air in peritoneum and findings for perforated acute appendicitis with findings of 6mm gall bladder lesion and right adnexal lesion. CXR negative. Surgery consulted and patient is scheduled for urgent ex lap, abdominal washout and possible bowel resection. ICU consulted for sepsis 2/2 perforated appendicitis. Patient admitted to ICU for sepsis 2/2 acute appendicitis.     #Acute Encephalopathy   #Sepsis  #Acute perforated appendicitis  #COPD  #Neuropathy  # Elevated lactate  # Gall bladder wall lesion   # MAYRA  #RIght Adnexal Lesion      ===========Neurology===========  #Acute encephalopathy   - patient noted at baseline to be Alert and oriented x3  - presented to ED Alert and oriented x1-->unable to complete full neurological exam   - acute encephalopathy 2/2 Sepsis 2/2 appendicitis with perforation  - admitted to ICU s/p LAP appendectomy with washout.  - urine culture negative  - blood cx, 1 set pos for gram neg rods bacteroides fragilis 9/11; 2nd set 9/13 negative   - Mental status returned to baseline--> A&Ox3, following commands and appropriate    ==========Cardiovascular=============  - No acute issues   - home meds include amlodipine and lisinopril, held for hypotension 2/2 sepsis  - pt normotensive, will reassess need to restart home antihypertensives if become hypertensive      ============Pulmonology===========  # Hx of COPD   - patient with hx of COPD   - not on home oxygen or home COPD meds   - Chest X-ray shows same findings as previous, increased central interstitial markings.  - on room air at this time, normal oxygen saturation noted    =========GI=============  # Sepsis 2/2 Perforated acute appendicitis   - noted with findings of acute perforated appendicitis on CT scan  - Pt underwent laparoscopic appendectomy and washout---> peritoneum full of feculent pus according to surgeon, Dr. Sunshine  - hemodynamically stable, not requiring pressors   - urine culture negative  - blood cx, 1 set pos for gram neg rods Bacteroides fragilis  - E. coli in peritoneal fluid  - IVF hydration performed while NPO. Oral hydration initiated 9/14 and tolerating at this time   - Pain control with morphine 2mg IVP PRN Q6hrs  - ID Dr. Moffett following      #Gallbladder wall lesion  - patient on CT noted with Focal 6 mm enhancing lesion at the gallbladder fundal wall  - Consider MR abdomen when clinically stable as per GI      ==========Renal==========  #MAYRA  - patient noted to have Scr of 1.41 on admission  - avoid nephrotoxic agents  - Cr trended down--> 0.56 (9/16/23)    =========Infectious Disease===========  # Sepsis 2/2 Perforated acute appendicitis   - CT performed-->noted findings of acute perforated appendicitis  - s/p diagnostic laparoscopy with appendectomy and washout 9/11/23  - urine culture negative  - blood cx, 1 set pos for gram neg rods Bacteroides fragilis, 2nd blood cultures negative  - E. coli in peritoneal fluid noted  - hemodynamically stable, not requiring pressors  - IV hydration performed while NPO  - ID Dr. Moffett Following  - continue with piperacillin/tazobactam  3.375g    =========Heme===========  - No acute issues   - RLE US negative for DVT  - heparin subq for DVT prophylaxis    ==========Endo==========  #Neuropathy   - home med pregabalin maintained    ==========Skin===========  - No acute issues   - LAP sites BARNEY with no signs of erythremia     =========Reproductive===========  #Right Adnexal lesion  - CT abdomen showing 5.2 cm soft tissue density structure in the right adnexa may represent an exophytic uterine fibroid, or a right ovarian mass  - f/u pelvic US when stable     ========Prophylaxis=======  - heparin subq    Code Status: Full Code    Disposition: Patient remain in ICU   Assessment    Patient is a 74 yo female with hx of COPD, not on home oxygen, neuropathy presents with abdominal pain. Upon evaluation in ED, patient noted with temp of 101.8F, BP of 127/66, saturating 91% on Room air with hr of 124bpm. Physical examination noted with diffuse tenderness in abdominal region with guarding. Labs significant for leukocytosis of 13, lactate of 3.5 and scr of 1.41. CT abdomen and pelvis noted for free air in peritoneum and findings for perforated acute appendicitis with findings of 6mm gall bladder lesion and right adnexal lesion. CXR negative. Surgery consulted and patient is scheduled for urgent ex lap, abdominal washout and possible bowel resection. ICU consulted for sepsis 2/2 perforated appendicitis. Patient admitted to ICU for sepsis 2/2 acute appendicitis.     #Acute Encephalopathy   #Sepsis  #Acute perforated appendicitis  #COPD  #Neuropathy  # Elevated lactate  # Gall bladder wall lesion   # MAYRA  #RIght Adnexal Lesion      ===========Neurology===========  #Acute encephalopathy 2/2 Sepsis 2/2 appendicitis with perforation  - patient noted at baseline to be Alert and oriented x3, presented to ED Alert and oriented x1  - admitted to ICU s/p LAP appendectomy with washout.  - urine culture negative  - blood cx, 1 set pos bacteroides fragilis 9/11; 2nd set 9/13 negative   - Mental status returned to baseline--> A&Ox3, following commands and appropriate    ==========Cardiovascular=============  - No acute issues   - home meds include amlodipine and lisinopril, held for hypotension 2/2 sepsis  - pt normotensive, will restart as appropriate     ============Pulmonology===========  # Hx of COPD   - patient with hx of COPD   - not on home oxygen or home COPD meds   - Chest X-ray shows same findings as previous, increased central interstitial markings.  - on room air at this time, satting > 92%    =========GI=============  # Sepsis 2/2 Perforated acute appendicitis   - noted with findings of acute perforated appendicitis on CT scan  - Pt underwent laparoscopic appendectomy and washout---> peritoneum full of feculent pus according to surgeon, Dr. Sunshine  - hemodynamically stable, not requiring pressors   - urine culture negative  - blood cx, 1 set pos bacteroides fragilis 9/11; 2nd set 9/13 negative;  peritoneal fluid pos e. coli  - Pain control with morphine 2mg IVP PRN Q6hrs  - ID Dr. Moffett following    #Nutrition  -diet advanced from clears 9/14, now tolerating DASH/TLC diet    #Gallbladder wall lesion  - patient on CT noted with Focal 6 mm enhancing lesion at the gallbladder fundal wall  - Consider MR abdomen when clinically stable as per GI      ==========Renal==========  #MAYRA  - patient noted to have Scr of 1.41 on admission  - avoid nephrotoxic agents  - Cr trended down--> 0.56 (9/16/23)    =========Infectious Disease===========  # Sepsis 2/2 Perforated acute appendicitis   - CT performed-->noted findings of acute perforated appendicitis  - s/p diagnostic laparoscopy with appendectomy and washout 9/11/23  - urine culture negative  - blood cx, 1 set pos bacteroides fragilis 9/11; 2nd set 9/13 negative;  peritoneal fluid pos e. coli  - hemodynamically stable, not requiring pressors  - ID Dr. Moffett Following  - continue with piperacillin/tazobactam 3.375g q8    =========Heme===========  - No acute issues   - RLE US negative for DVT  - heparin subq for DVT prophylaxis    ==========Endo==========  #Neuropathy   - home med pregabalin maintained    ==========Skin===========  - No acute issues   - LAP sites BARNEY with no signs of erythremia   - MARY LOU drains in place, management per Sx    =========Reproductive===========  #Right Adnexal lesion  - CT abdomen showing 5.2 cm soft tissue density structure in the right adnexa may represent an exophytic uterine fibroid, or a right ovarian mass  - f/u pelvic US when stable     ========Prophylaxis=======  - heparin subq    Code Status: Full Code    Disposition: Patient remain in ICU

## 2023-09-16 NOTE — PROGRESS NOTE ADULT - SUBJECTIVE AND OBJECTIVE BOX
ICU visit:  73y Female is under our care for sepsis, acute appendicitis with perforation, generalized peritonitis, leukocytosis, and bacteremia with Bacteroides. Patient is doing well and was found to be sitting comfortably on chair and chatting with some guests ( included). She admits she is feeling much better and content that she was able to have a BM today. Also experiencing some dysuria, specimen not collected yet. Has been waiting to be downgraded to floor but no beds available as of yet. No recurrent fevers and wbc coutn remains mildly elevated but stable. Repeat BC remain negative. Peritoneal cultures are growing 2 strains of e. coli that are both sensitive to zosyn.    MEDS:  piperacillin/tazobactam IVPB.. 3.375 Gram(s) IV Intermittent every 8 hours    ALLERGIES: Allergies    sulfa drugs (Unknown)    Intolerances    REVIEW OF SYSTEMS:  [  ] Not able to elicit  General: no fevers no malaise  Chest: no cough no sob  GI: no nvd +improved abdominal pain +BM  : +dysuria  Skin: no rashes  Musculoskeletal: no trauma no LBP  Neuro: no ha's no dizziness     VITALS:  ICU Vital Signs Last 24 Hrs  T(C): 36.4 (16 Sep 2023 08:00), Max: 37.5 (15 Sep 2023 20:00)  T(F): 97.6 (16 Sep 2023 08:00), Max: 99.5 (15 Sep 2023 20:00)  HR: 99 (16 Sep 2023 11:00) (74 - 110)  BP: 146/73 (16 Sep 2023 11:00) (107/97 - 153/64)  BP(mean): 94 (16 Sep 2023 11:00) (66 - 136)  ABP: --  ABP(mean): --  RR: 26 (16 Sep 2023 11:00) (19 - 29)  SpO2: 91% (16 Sep 2023 11:00) (89% - 95%)    PHYSICAL EXAM:  HEENT: n/a  Neck: supple no LN's   Respiratory: lungs clear no rales  Cardiovascular: S1 S2 tachy no murmurs  Gastrointestinal: +BS with soft, nondistended abdomen; RLQ abdominal tenderness  Extremities: no edema  Skin: no rashes  Ortho: mild kyphosis  Neuro: AAO x 3    LABS/DIAGNOSTIC TESTS:                        11.7   13.97 )-----------( 272      ( 16 Sep 2023 04:20 )             35.7     WBC Count: 13.97 K/uL (09-16 @ 04:20)  WBC Count: 13.55 K/uL (09-15 @ 04:50)  WBC Count: 12.04 K/uL (09-14 @ 03:20)  WBC Count: 7.52 K/uL (09-13 @ 03:50)  WBC Count: 5.41 K/uL (09-12 @ 04:51)    09-16    141  |  108  |  7   ----------------------------<  93  3.7   |  28  |  0.56    Ca    7.9<L>      16 Sep 2023 04:20  Phos  2.1     09-16  Mg     2.0     09-16    TPro  5.7<L>  /  Alb  1.9<L>  /  TBili  0.6  /  DBili  x   /  AST  17  /  ALT  20  /  AlkPhos  83  09-16        CULTURES:   .Blood Blood-Peripheral  09-13 @ 03:59   No growth at 72 Hours  --  --      .Blood Blood-Peripheral  09-13 @ 03:50   No growth at 72 Hours  --  --      Abdominal Fl Abdominal Fluid  09-11 @ 22:00   Culture is being performed.  --  Escherichia coli  Escherichia coli      Clean Catch Clean Catch (Midstream)  09-11 @ 15:00   No growth  --  --      .Blood Blood-Peripheral  09-11 @ 12:50   Growth in anaerobic bottle: Gram Negative Rods  --    Growth in anaerobic bottle: Gram Negative Rods      .Blood Blood-Peripheral  09-11 @ 12:45   Growth in anaerobic bottle: Bacteroides fragilis "Susceptibilities not  performed"  Direct identification is available within approximately 3-5  hours either by Blood Panel Multiplexed PCR or Direct  MALDI-TOF. Details: [email or web address]  --  Blood Culture PCR        RADIOLOGY:  no new studies

## 2023-09-16 NOTE — PROGRESS NOTE ADULT - ATTENDING COMMENTS
d/w patient and staff plan of care
IMP: This is a 73 yr old woman  with  COPD,  neuropathy presents with abdominal pain. Upon evaluation in ED, patient noted with temp of 101.8, BP of 127/66, saturating 91% on Room air with hr of 124bpm. Physical examination noted with diffuse tenderness in abdominal region with guarding. Labs significant for leukocytosis of 13, lactate of 3.5 and scr of 1.41. CT abdomen and pelvis noted for free air in peritoneum and findings for perforated acute appendicitis with findings of 6mm gall bladder lesion and right adnexal lesion. CXR negative. Surgery consulted and patient is scheduled for urgent ex lap, abdominal washout and possible bowel resection. ICU consulted for sepsis 2/2 perforated appendicitis/ peritonitis  Patient admitted to ICU for sepsis 2/2 acute appendicitis.     - Acute Encephalopathy   - Sepsis  - Acute perforated appendicitis  - COPD  - Neuropathy  - Elevated lactate  - Gall bladder wall lesion   - MAYRA  - RIght Adnexal Lesion    - Active smoker      Plan       - No sedation   - Pain control   - Encephalopathy due to sepsis   - O2 supp as needed   - Hemodynamic monitoring   - IVF  - MAYRA resolved   - Lactate trending down   - Iv antibx to cover gram neg   - F/u cultures  - S/P laparoscopic appendectomy for perforated appendix with peritonitis    - ID eval   - NPO   - Asp precaution   - Lindo cath   - Pelvic US to evaluate adnexa lesion vs MRI of abd   - GI for gallbladder lesion   - DVT GI prophy .
IMP: This is a 73 yr old woman  with  COPD,  neuropathy presents with abdominal pain. Upon evaluation in ED, patient noted with temp of 101.8, BP of 127/66, saturating 91% on Room air with hr of 124bpm. Physical examination noted with diffuse tenderness in abdominal region with guarding. Labs significant for leukocytosis of 13, lactate of 3.5 and scr of 1.41. CT abdomen and pelvis noted for free air in peritoneum and findings for perforated acute appendicitis with findings of 6mm gall bladder lesion and right adnexal lesion. CXR negative. Surgery consulted and patient is scheduled for urgent ex lap, abdominal washout and possible bowel resection. ICU consulted for sepsis 2/2 perforated appendicitis/ peritonitis  Patient admitted to ICU for sepsis 2/2 acute appendicitis.     - Acute Encephalopathy   - Sepsis  - Acute perforated appendicitis  - COPD  - Neuropathy  - Elevated lactate  - Gall bladder wall lesion   - MAYRA  - RIght Adnexal Lesion    - Active smoker      Plan       - No sedation   - Pain control   - Encephalopathy improving due to sepsis   - O2 supp as needed   - Hemodynamic monitoring   - IVF  - MAYRA resolved   - Lactate trending down   - Iv antibx to cover gram neg   - F/u cultures  - Repeat BC   - S/P laparoscopic appendectomy for perforated appendix with peritonitis    - ID eval noted ,, waiting for peritoneal cultures  - Wound care as per surgery   - NPO , diet when ok by surgery   - Asp precaution   - Lindo cath   - Pelvic US to evaluate adnexa lesion vs MRI of abd   - GI for gallbladder lesion   - DVT GI prophy .  - OOB to chair
IMP: This is a 73 yr old woman  with  COPD,  neuropathy presents with abdominal pain. Upon evaluation in ED, patient noted with temp of 101.8, BP of 127/66, saturating 91% on Room air with hr of 124bpm. Physical examination noted with diffuse tenderness in abdominal region with guarding. Labs significant for leukocytosis of 13, lactate of 3.5 and scr of 1.41. CT abdomen and pelvis noted for free air in peritoneum and findings for perforated acute appendicitis with findings of 6mm gall bladder lesion and right adnexal lesion. CXR negative. Surgery consulted and patient is scheduled for urgent ex lap, abdominal washout and possible bowel resection. ICU consulted for sepsis 2/2 perforated appendicitis/ peritonitis  Patient admitted to ICU for sepsis 2/2 acute appendicitis.     - Acute Encephalopathy   - Sepsis  - Acute perforated appendicitis  - COPD  - Neuropathy  - Elevated lactate  - Gall bladder wall lesion   - MAYRA  - RIght Adnexal Lesion    - Active smoker      Plan       - Pain control   - Encephalopathy improving , answering questions appropriately   - O2 supp as needed   - Hemodynamic monitoring   - Diet as per surgery   - MAYRA resolved   - Iv antibx to cover gram neg   - F/u cultures neg   - Repeat BC neg  - S/P laparoscopic appendectomy for perforated appendix with peritonitis    - ID following    - Peritoneal fluid + E.coli   - Wound care as per surgery   - Asp precaution   - Lindo cath   - Pelvic US to evaluate adnexa lesion vs MRI of abd   - GI for gallbladder lesion   - DVT GI prophy .  - OOB to chair.   - Incentive spirometry .  - For OZZY
IMP: This is a 73 yr old woman  with  COPD,  neuropathy presents with abdominal pain. Upon evaluation in ED, patient noted with temp of 101.8, BP of 127/66, saturating 91% on Room air with hr of 124bpm. Physical examination noted with diffuse tenderness in abdominal region with guarding. Labs significant for leukocytosis of 13, lactate of 3.5 and scr of 1.41. CT abdomen and pelvis noted for free air in peritoneum and findings for perforated acute appendicitis with findings of 6mm gall bladder lesion and right adnexal lesion. CXR negative. Surgery consulted and patient is scheduled for urgent ex lap, abdominal washout and possible bowel resection. ICU consulted for sepsis 2/2 perforated appendicitis/ peritonitis  Patient admitted to ICU for sepsis 2/2 acute appendicitis.     - Acute Encephalopathy   - Sepsis  - Acute perforated appendicitis  - COPD  - Neuropathy  - Elevated lactate  - Gall bladder wall lesion   - MAYRA  - RIght Adnexal Lesion    - Active smoker      Plan       - Pain control   - Encephalopathy resolved   - O2 supp as needed   - Hemodynamic monitoring   - Diet as per surgery   - MAYRA resolved   - Iv antibx to cover gram neg   - Repeat BC neg  - S/P laparoscopic appendectomy for perforated appendix with peritonitis    - ID following    - Peritoneal fluid + E.coli   - Wound care as per surgery   - Asp precaution   - Lindo cath   - Pelvic US to evaluate adnexa lesion vs MRI of abd   - GI for gallbladder lesion   - DVT GI prophy .  - OOB to chair.   - Incentive spirometry .  - For OZZY .
IMP: This is a 73 yr old woman  with  COPD,  neuropathy presents with abdominal pain. Upon evaluation in ED, patient noted with temp of 101.8, BP of 127/66, saturating 91% on Room air with hr of 124bpm. Physical examination noted with diffuse tenderness in abdominal region with guarding. Labs significant for leukocytosis of 13, lactate of 3.5 and scr of 1.41. CT abdomen and pelvis noted for free air in peritoneum and findings for perforated acute appendicitis with findings of 6mm gall bladder lesion and right adnexal lesion. CXR negative. Surgery consulted and patient is scheduled for urgent ex lap, abdominal washout and possible bowel resection. ICU consulted for sepsis 2/2 perforated appendicitis/ peritonitis  Patient admitted to ICU for sepsis 2/2 acute appendicitis.     - Acute Encephalopathy   - Sepsis  - Acute perforated appendicitis  - COPD  - Neuropathy  - Elevated lactate  - Gall bladder wall lesion   - MAYRA  - RIght Adnexal Lesion    - Active smoker      Plan       - Pain control   - Encephalopathy improving , answering questions appropriately   - O2 supp as needed   - Hemodynamic monitoring   - Diet as per surgery   - MAYRA resolved   - Iv antibx to cover gram neg   - F/u cultures neg   - Repeat BC   - S/P laparoscopic appendectomy for perforated appendix with peritonitis    - ID eval noted ,, waiting for peritoneal cultures  - Wound care as per surgery   - Asp precaution   - Lindo cath   - Pelvic US to evaluate adnexa lesion vs MRI of abd   - GI for gallbladder lesion   - DVT GI prophy .  - OOB to chair.   - Incentive spirometry

## 2023-09-16 NOTE — CHART NOTE - NSCHARTNOTEFT_GEN_A_CORE
73y old  Female with hx of COPD (active smoker, not on home oxygen), peripheral neuropathy presents as per ED documentation with abdominal pain. Patient unable to provide history, and history is provided by Patient sister (Vlad, [phone]) and patient's  Leroy ([phone]). Patient's sister states that on Saturday night patient started to complain of abdominal pain, and had 1 episode of NBNB emesis. Pt was found to have ruptured appendix and was admitted for sepsis. She underwent laporoscopic appendectomy with 3 liters of irrigation and 2 MARY LOU drains inserted. Dr. Sunshine performed the surgery and stated there was pus throughout the abdomen. Pt had a baker inserted and was brought to the ICU. She continued to be confused and A&Ox1. She was started on Zosyn for antibiotic and Dilaudid for pain control and made NPO. She was also given fluids to rehydrate. Precedex drip was started since pt was disoriented and pulling out lines when she was awake. Blood cultures were drawn and showed Bacteroides fragilis gram neg rods. On 9/14, Precedex drip was discontinued, blood pressure was stable and  Her mental status continued to improve, she was A&Ox3, OOBTC. Baker catheter was removed and pt was able to void with purewick. Pt getting Morphine and Tyleonol as needed for pain. Recommend to consult pain management on the floors. 73y old  Female with hx of COPD (active smoker, not on home oxygen), peripheral neuropathy presents as per ED documentation with abdominal pain. Patient unable to provide history, and history is provided by Patient sister (Vlad, [phone]) and patient's  Leroy ([phone]). Patient's sister states that on Saturday night patient started to complain of abdominal pain, and had 1 episode of NBNB emesis. Pt was found to have ruptured appendix and was admitted for sepsis. She underwent laparoscopic appendectomy with 3 liters of irrigation and 2 MARY LOU drains inserted. Dr. Sunshine performed the surgery and stated there was pus throughout the abdomen. Pt had a baker inserted and was brought to the ICU. She continued to be confused and A&Ox1. She was started on Zosyn for antibiotic and Dilaudid for pain control and made NPO. She was also given fluids to rehydrate. Precedex drip was started since pt was disoriented and pulling out lines when she was awake. Blood cultures were drawn and showed Bacteroides fragilis gram neg rods. On 9/14, Precedex drip was discontinued, blood pressure was stable and  Her mental status continued to improve, she was A&Ox3, OOBTC. Baker catheter was removed and pt was able to void with pure wick. Pt getting Morphine and Tylenol as needed for pain. Recommend to consult pain management on the floors.      Patient is stable for downgrade to floor under care of  ------------- for further management , covering resident MARKOS Gimenez. was informed.    Things to follow up  - F/u with ID regarding the duration of antibiotics 73y old  Female with hx of COPD (active smoker, not on home oxygen), peripheral neuropathy presents as per ED documentation with abdominal pain. Patient unable to provide history, and history is provided by Patient sister (Vlad, [phone]) and patient's  Leroy ([phone]). Patient's sister states that on Saturday night patient started to complain of abdominal pain, and had 1 episode of NBNB emesis. Pt was found to have ruptured appendix and was admitted for sepsis. She underwent laparoscopic appendectomy with 3 liters of irrigation and 2 MARY LOU drains inserted. Dr. Sunshine performed the surgery and stated there was pus throughout the abdomen. Pt had a baker inserted and was brought to the ICU. She continued to be confused and A&Ox1. She was started on Zosyn for antibiotic and Dilaudid for pain control and made NPO. She was also given fluids to rehydrate. Precedex drip was started since pt was disoriented and pulling out lines when she was awake. Blood cultures were drawn and showed Bacteroides fragilis gram neg rods. On 9/14, Precedex drip was discontinued and  blood pressure has been stable. Her mental status continued to improve, she is AAOx3 and OOBTC. Baker catheter was removed and pt was able to void with pure wick. Pt getting Morphine and Tylenol as needed for pain. Recommend to consult pain management on the floors.      Patient is stable for downgrade to floor under care of . ------------- for further management , covering resident MARKOS Gimenez. was informed.    Things to follow up  - F/u with ID regarding the duration of antibiotics  - Pain management 73y old  Female with hx of COPD (active smoker, not on home oxygen), peripheral neuropathy presents as per ED documentation with abdominal pain. Patient unable to provide history, and history is provided by Patient sister (Vlad, [phone]) and patient's  Leroy ([phone]). Patient's sister states that on Saturday night patient started to complain of abdominal pain, and had 1 episode of NBNB emesis. Pt was found to have ruptured appendix and was admitted for sepsis. She underwent laparoscopic appendectomy with 3 liters of irrigation and 2 MARY LOU drains inserted. Dr. Sunshine performed the surgery and stated there was pus throughout the abdomen. Pt had a baker inserted and was brought to the ICU. She continued to be confused and A&Ox1. She was started on Zosyn for antibiotic and Dilaudid for pain control and made NPO. She was also given fluids to rehydrate. Precedex drip was started since pt was disoriented and pulling out lines when she was awake. Blood cultures were drawn and showed Bacteroides fragilis gram neg rods. On 9/14, Precedex drip was discontinued and  blood pressure has been stable. Her mental status continued to improve, she is AAOx3 and OOBTC. Baker catheter was removed and pt was able to void with pure wick. Pt getting Morphine and Tylenol as needed for pain. Recommend to consult pain management on the floors.      Patient is stable for downgrade to floor under care of Dr. Barboza for further management , covering resident MARKOS Gimenez. was informed.    Things to follow up  - F/u with ID regarding the duration of antibiotics  - Pain management 73y old  Female with hx of COPD (active smoker, not on home oxygen), peripheral neuropathy presents as per ED documentation with abdominal pain. Patient unable to provide history, and history is provided by Patient sister (Vlad, [phone]) and patient's  Leroy ([phone]). Patient's sister states that on Saturday night patient started to complain of abdominal pain, and had 1 episode of NBNB emesis. Pt was found to have ruptured appendix and was admitted for sepsis. She underwent laparoscopic appendectomy with 3 liters of irrigation and 2 MARY LOU drains inserted. Dr. Sunshine performed the surgery and stated there was pus throughout the abdomen. Pt had a baker inserted and was brought to the ICU. She continued to be confused and A&Ox1. She was started on Zosyn for antibiotic and Dilaudid for pain control and made NPO. She was also given fluids to rehydrate. Precedex drip was started since pt was disoriented and pulling out lines when she was awake. Blood cultures were drawn and showed Bacteroides fragilis gram neg rods. On 9/14, Precedex drip was discontinued and  blood pressure has been stable. Her mental status continued to improve, she is AAOx3 and OOBTC. Baker catheter was removed and pt was able to void with pure wick. Pt getting Morphine and Tylenol as needed for pain. Recommend to consult pain management on the floors.      Patient is stable for downgrade to floor under care of Dr. Barboza for further management , covering resident MARKOS Gimenez. was informed.    Things to follow up  - F/u with ID regarding the duration of antibiotics  - Pain management      ***Spoke to patient son Leroy Arora ([phone]) about the patient being downgraded to the floors*** 73y old  Female with hx of COPD (active smoker, not on home oxygen), peripheral neuropathy presents as per ED documentation with abdominal pain. Patient unable to provide history, and history is provided by Patient sister (Vlad, [phone]) and patient's  Leroy ([phone]). Patient's sister states that on Saturday night patient started to complain of abdominal pain, and had 1 episode of NBNB emesis. Pt was found to have ruptured appendix and was admitted for sepsis. She underwent laparoscopic appendectomy with 3 liters of irrigation and 2 MARY LOU drains inserted. Dr. Sunshine performed the surgery and stated there was pus throughout the abdomen. Pt had a baker inserted and was brought to the ICU. She continued to be confused and A&Ox1. She was started on Zosyn for antibiotic and Dilaudid for pain control and made NPO. She was also given fluids to rehydrate. Precedex drip was started since pt was disoriented and pulling out lines when she was awake. Blood cultures were drawn and showed Bacteroides fragilis gram neg rods. On 9/14, Precedex drip was discontinued and  blood pressure has been stable. Her mental status continued to improve, she is AAOx3 and OOBTC. Baker catheter was removed and pt was able to void with pure wick. Pt getting Morphine and Tylenol as needed for pain. Recommend to consult pain management on the floors.      Patient is stable for downgrade to floor under care of Dr. Barboza for further management , covering  MARKOS Gimenez. was informed.    Things to follow up  - F/u with ID regarding the duration of antibiotics  - Pain management      ***Spoke to patient son Leroy Arora ([phone]) about the patient being downgraded to the floors***

## 2023-09-16 NOTE — PROGRESS NOTE ADULT - SUBJECTIVE AND OBJECTIVE BOX
INTERVAL HPI/OVERNIGHT EVENTS:    VITAL SIGNS:  T(F): 99.5 (09-16-23 @ 04:00)  HR: 96 (09-16-23 @ 07:00)  BP: 142/65 (09-16-23 @ 07:00)  RR: 19 (09-16-23 @ 07:00)  SpO2: 92% (09-16-23 @ 07:00)  Wt(kg): --    PHYSICAL EXAM:    Constitutional:  Eyes:  ENMT:  Neck:  Respiratory:  Cardiovascular:  Gastrointestinal:  Extremities:  Vascular:  Neurological:  Musculoskeletal:    MEDICATIONS  (STANDING):  chlorhexidine 2% Cloths 1 Application(s) Topical <User Schedule>  famotidine    Tablet 20 milliGRAM(s) Oral daily  heparin   Injectable 5000 Unit(s) SubCutaneous every 8 hours  piperacillin/tazobactam IVPB.. 3.375 Gram(s) IV Intermittent every 8 hours  pregabalin 150 milliGRAM(s) Oral three times a day    MEDICATIONS  (PRN):  morphine  - Injectable 2 milliGRAM(s) IV Push every 6 hours PRN Severe Pain (7 - 10)      Allergies    sulfa drugs (Unknown)    Intolerances        LABS:                        11.7   13.97 )-----------( 272      ( 16 Sep 2023 04:20 )             35.7     09-16    141  |  108  |  7   ----------------------------<  93  3.7   |  28  |  0.56    Ca    7.9<L>      16 Sep 2023 04:20  Phos  2.1     09-16  Mg     2.0     09-16    TPro  5.7<L>  /  Alb  1.9<L>  /  TBili  0.6  /  DBili  x   /  AST  17  /  ALT  20  /  AlkPhos  83  09-16      Urinalysis Basic - ( 16 Sep 2023 04:20 )    Color: x / Appearance: x / SG: x / pH: x  Gluc: 93 mg/dL / Ketone: x  / Bili: x / Urobili: x   Blood: x / Protein: x / Nitrite: x   Leuk Esterase: x / RBC: x / WBC x   Sq Epi: x / Non Sq Epi: x / Bacteria: x        RADIOLOGY & ADDITIONAL TESTS:      Assessment and Plan:   · Assessment	  · Assessment    Patient is a 72 yo female with hx of COPD, not on home oxygen, neuropathy presents with abdominal pain. Upon evaluation in ED, patient noted with temp of 101.8, BP of 127/66, saturating 91% on Room air with hr of 124bpm. Physical examination noted with diffuse tenderness in abdominal region with guarding. Labs significant for leukocytosis of 13, lactate of 3.5 and scr of 1.41. CT abdomen and pelvis noted for free air in peritoneum and findings for perforated acute appendicitis with fidings of 6mm gall bladder lesion and right adnexal lesion. CXR negative. Surgery consulted and patient is scheduled for urgent ex lap, abdominal washout and possible bowel resection. ICU consulted for sepsis 2/2 perforated appendicitis. Patient admitted to ICU for sepsis 2/2 acute appendicitis.     #Acute Encephalopathy   #Sepsis  #Acute perforated appendicitis  #COPD  #Neuropathy  # Elevated lactate  # Gall bladder wall lesion   # MAYRA  #RIght Adnexal Lesion      ===========Neurology===========  #Acute encephalopathy   - patient noted at baseline to be Alert and oriented x3  - presented to ED Alert and oriented x1  - unable to complete full neurological exam   - can be 2/2 Sepsis induced vs possible CVA   - unable to get CT head due to receiving IV contrast  - admit to ICU   - continue with zosyn   - trend lactate  - will give 2L LR   - urine culture negative  - blood cx, 1 set pos for gram neg rods bacteroides fragilis  - will f/u with lab for sensitivities  - cont dextrose 5% + LR  - Improving to A&Ox3, following commands    ==========Cardiovascular=============  - No acute issues   - home meds include amlodipine and lisinopril, holding for now    ============Pulmonology===========  # Hx of COPD   - patient with hx of COPD   - not on home oxygen   - no home COPD meds  - on room air  - Chest X-ray shows same findings as previous, increased central interstitial markings.    =========GI=============  # Sepsis 2/2 Perforated acute appendicitis   - noted with findings of acute perforated appendicitis on CT scan  - Pt underwent laparoscopic appendectomy and washout  - peritoneum full of feculent pus according to surgeon, Dr. Folek  - hemodynamically stable, not requiring pressors   - IVF hydration   - continue with zosyn   - trend lactate  - urine culture negative  - blood cx, 1 set pos for gram neg rods Bacteroides fragilis  - E. coli in peritoneal fluid  - ID consulted Dr. Moffett   - Pain control with morphine  - Clear liquid diet    #Gallbladder wall lesion  - patient on CT noted with Focal 6 mm enhancing lesion at the gallbladder fundal wall  - Consider MR abdomen when clinically stable      ==========Renal==========  #MAYRA  - patient noted to have Scr of 1.41 on admission  - avoid nephrotoxic agents  - Cr trended down    =========Infectious Disease===========  # Sepsis 2/2 Perforated acute appendicitis   - noted with findings of acute perforated appendicitis on CT scan  - hemodynamically stable, not requiring pressors   - Clear liquid diet  - IVF hydration   - s/p diagnostic laparoscopy with appendectomy and washout  - trend lactate  - urine culture negative  - blood cx, 1 set pos for gram neg rods Bacteroides fragilis  - E. coli in peritoneal fluid  - ID consulted Dr. Moffett   - continue with zosyn   - will f/u for sensitivities    =========Heme===========  - No acute issues   - heparin subq for DVT prophylaxis  - RLE US negative for DVT    ==========Endo==========  #Neuropathy   - home med pregabalin    ==========Skin===========  - No acute issues     =========Reproductive===========  #Right Adnexal lesion  - CT abdomen showing 5.2 cm soft tissue density structure in the right adnexa may represent an exophytic uterine fibroid, or a right ovarian mass  - f/u pelvic US when stable     ========Prophylaxis=======  - heparin subq    Code Status: Full Code

## 2023-09-16 NOTE — PROGRESS NOTE ADULT - SUBJECTIVE AND OBJECTIVE BOX
INTERVAL HPI/OVERNIGHT EVENTS:    Pt seen and examined at bedside. Offers no acute complaints at this time. Tolerating reg diet well.   Tmax 101      Vital Signs Last 24 Hrs  T(C): 36.4 (16 Sep 2023 08:00), Max: 37.5 (15 Sep 2023 20:00)  T(F): 97.6 (16 Sep 2023 08:00), Max: 99.5 (15 Sep 2023 20:00)  HR: 99 (16 Sep 2023 11:00) (74 - 112)  BP: 146/73 (16 Sep 2023 11:00) (107/97 - 153/64)  BP(mean): 94 (16 Sep 2023 11:00) (66 - 136)  RR: 26 (16 Sep 2023 11:00) (19 - 29)  SpO2: 91% (16 Sep 2023 11:00) (89% - 95%)    Parameters below as of 16 Sep 2023 07:00  Patient On (Oxygen Delivery Method): room air      I&O's Detail    15 Sep 2023 07:01  -  16 Sep 2023 07:00  --------------------------------------------------------  IN:    IV PiggyBack: 499.8 mL    IV PiggyBack: 500 mL    IV PiggyBack: 300 mL    Oral Fluid: 400 mL  Total IN: 1699.8 mL    OUT:    Bulb (mL): 70 mL    Bulb (mL): 80 mL    Voided (mL): 750 mL  Total OUT: 900 mL    Total NET: 799.8 mL      famotidine    Tablet 20 milliGRAM(s) Oral daily  phenazopyridine 100 milliGRAM(s) Oral every 8 hours  piperacillin/tazobactam IVPB.. 3.375 Gram(s) IV Intermittent every 8 hours      Physical Exam  General: AAOx3, No acute distress  Skin: No jaundice, no icterus  Abdomen: soft, nondistended, nontender, no rebound tenderness, no guarding, no palpable masses, MARY LOU x2 serous output   Extremities: non edematous, no calf pain bilaterally        Labs:                        11.7   13.97 )-----------( 272      ( 16 Sep 2023 04:20 )             35.7     09-16    141  |  108  |  7   ----------------------------<  93  3.7   |  28  |  0.56    Ca    7.9<L>      16 Sep 2023 04:20  Phos  2.1     09-16  Mg     2.0     09-16    TPro  5.7<L>  /  Alb  1.9<L>  /  TBili  0.6  /  DBili  x   /  AST  17  /  ALT  20  /  AlkPhos  83  09-16

## 2023-09-16 NOTE — PROGRESS NOTE ADULT - ASSESSMENT
Acute Appendicitis with Perforation  Generalized Peritonitis  Leukocytosis - mild  Bacteremia with Bacteroides - cultures are neg  S/p sepsis    Plan:  ·	Cont Zosyn 3.375 gms iv q8hrs  D6  ·	awaiting downgrade to floor    Time spent - 33 mins

## 2023-09-16 NOTE — PROGRESS NOTE ADULT - ASSESSMENT
76-year-old-female presented with sepsis/bacteremia due to perforated appendicitis, s/p lap appendectomy and washout and 2 MARY LOU drain placement. Transferred back to SICU for resuscitation and monitoring.    Tmax 101    Continue ICU care  Replace lytes PRN  Strict I/O, UOP > 0.5cc/kg/h, IVFm + PRN  Diet as tolerated   MARY LOU care, dressing changes PRN  ABX as per by ID

## 2023-09-16 NOTE — PROGRESS NOTE ADULT - SUBJECTIVE AND OBJECTIVE BOX
INTERVAL HPI/OVERNIGHT EVENTS:       PRESSORS: [ ] YES [ ] NO  WHICH:    ANTIBIOTICS:                  DATE STARTED:  ANTIBIOTICS:                  DATE STARTED:    Antimicrobial:  piperacillin/tazobactam IVPB.. 3.375 Gram(s) IV Intermittent every 8 hours    Cardiovascular:    Pulmonary:    Hematalogic:  heparin   Injectable 5000 Unit(s) SubCutaneous every 8 hours    Other:  chlorhexidine 2% Cloths 1 Application(s) Topical <User Schedule>  famotidine    Tablet 20 milliGRAM(s) Oral daily  morphine  - Injectable 2 milliGRAM(s) IV Push every 6 hours PRN  pregabalin 150 milliGRAM(s) Oral three times a day    chlorhexidine 2% Cloths 1 Application(s) Topical <User Schedule>  famotidine    Tablet 20 milliGRAM(s) Oral daily  heparin   Injectable 5000 Unit(s) SubCutaneous every 8 hours  morphine  - Injectable 2 milliGRAM(s) IV Push every 6 hours PRN  piperacillin/tazobactam IVPB.. 3.375 Gram(s) IV Intermittent every 8 hours  pregabalin 150 milliGRAM(s) Oral three times a day    Drug Dosing Weight  Height (cm): 170.1 (14 Sep 2023 11:19)  Weight (kg): 69.3 (11 Sep 2023 21:45)  BMI (kg/m2): 24 (14 Sep 2023 11:19)  BSA (m2): 1.8 (14 Sep 2023 11:19)    PHYSICAL EXAM:  GENERAL: NAD  EYES: EOMI, PERRLA  NECK: Supple, No JVD; Trachea midline: No LAD   NERVOUS SYSTEM:  Alert & Oriented X3,  Motor Strength 5/5 B/L upper and lower extremities  CHEST/LUNG:  breath sounds present bilaterally, No rales, rhonchi, wheezing  HEART: Regular rate and rhythm; No murmurs, rubs, or gallops  ABDOMEN: Soft, Nontender, Nondistended; Bowel sounds present, no pain or masses on palpation  : voiding well, Linares in place  EXTREMITIES:  2+ Peripheral Pulses, No clubbing, cyanosis, or edema  SKIN: warm, intact, no lesions     LINES/DRAINS/DEVICES  CENTRAL LINE: [ ] YES [ ] NO  LOCATION:     LINARES: [ ] YES [ ] NO     A-LINE:  [ ] YES [ ] NO  LOCATION:       ICU Vital Signs Last 24 Hrs  T(C): 37.5 (16 Sep 2023 04:00), Max: 37.5 (15 Sep 2023 20:00)  T(F): 99.5 (16 Sep 2023 04:00), Max: 99.5 (15 Sep 2023 20:00)  HR: 96 (16 Sep 2023 07:00) (75 - 112)  BP: 142/65 (16 Sep 2023 07:00) (107/97 - 153/64)  BP(mean): 87 (16 Sep 2023 07:00) (66 - 136)  ABP: --  ABP(mean): --  RR: 19 (16 Sep 2023 07:00) (19 - 29)  SpO2: 92% (16 Sep 2023 07:00) (89% - 97%)    O2 Parameters below as of 16 Sep 2023 07:00  Patient On (Oxygen Delivery Method): room air                  09-15 @ 07:01  -  09-16 @ 07:00  --------------------------------------------------------  IN: 1699.8 mL / OUT: 900 mL / NET: 799.8 mL              LABS:  CBC Full  -  ( 16 Sep 2023 04:20 )  WBC Count : 13.97 K/uL  RBC Count : 4.06 M/uL  Hemoglobin : 11.7 g/dL  Hematocrit : 35.7 %  Platelet Count - Automated : 272 K/uL  Mean Cell Volume : 87.9 fl  Mean Cell Hemoglobin : 28.8 pg  Mean Cell Hemoglobin Concentration : 32.8 gm/dL  Auto Neutrophil # : 8.87 K/uL  Auto Lymphocyte # : 2.90 K/uL  Auto Monocyte # : 1.01 K/uL  Auto Eosinophil # : 0.14 K/uL  Auto Basophil # : 0.10 K/uL  Auto Neutrophil % : 63.5 %  Auto Lymphocyte % : 20.8 %  Auto Monocyte % : 7.2 %  Auto Eosinophil % : 1.0 %  Auto Basophil % : 0.7 %    09-16    141  |  108  |  7   ----------------------------<  93  3.7   |  28  |  0.56    Ca    7.9<L>      16 Sep 2023 04:20  Phos  2.1     09-16  Mg     2.0     09-16    TPro  5.7<L>  /  Alb  1.9<L>  /  TBili  0.6  /  DBili  x   /  AST  17  /  ALT  20  /  AlkPhos  83  09-16      Urinalysis Basic - ( 16 Sep 2023 04:20 )    Color: x / Appearance: x / SG: x / pH: x  Gluc: 93 mg/dL / Ketone: x  / Bili: x / Urobili: x   Blood: x / Protein: x / Nitrite: x   Leuk Esterase: x / RBC: x / WBC x   Sq Epi: x / Non Sq Epi: x / Bacteria: x          RADIOLOGY & ADDITIONAL STUDIES REVIEWED DURING TEAM ROUNDS    [ ]GOALS OF CARE DISCUSSION WITH PATIENT/FAMILY/PROXY:    CRITICAL CARE TIME SPENT: 35 minutes   INTERVAL HPI/OVERNIGHT EVENTS: No overnight events noted. Pt assessed at bedside. Pt is in bed at this time. Pt is comfortable and no acute signs of distress. Pt denies pain at this time. Pt remains hemodynamically stable. Pt states that have been experiencing dysuria. UA ordered and will followup with results.       PRESSORS: [ ] YES [X ] NO      ANTIBIOTICS: piperacillin/tazobactam IVPB.. 3.375 Gram            DATE STARTED:9/11/23      Antimicrobial:  piperacillin/tazobactam IVPB.. 3.375 Gram(s) IV Intermittent every 8 hours    Cardiovascular:    Pulmonary:    Hematalogic:  heparin   Injectable 5000 Unit(s) SubCutaneous every 8 hours    Other:  chlorhexidine 2% Cloths 1 Application(s) Topical <User Schedule>  famotidine    Tablet 20 milliGRAM(s) Oral daily  morphine  - Injectable 2 milliGRAM(s) IV Push every 6 hours PRN  pregabalin 150 milliGRAM(s) Oral three times a day    chlorhexidine 2% Cloths 1 Application(s) Topical <User Schedule>  famotidine    Tablet 20 milliGRAM(s) Oral daily  heparin   Injectable 5000 Unit(s) SubCutaneous every 8 hours  morphine  - Injectable 2 milliGRAM(s) IV Push every 6 hours PRN  piperacillin/tazobactam IVPB.. 3.375 Gram(s) IV Intermittent every 8 hours  pregabalin 150 milliGRAM(s) Oral three times a day    Drug Dosing Weight  Height (cm): 170.1 (14 Sep 2023 11:19)  Weight (kg): 69.3 (11 Sep 2023 21:45)  BMI (kg/m2): 24 (14 Sep 2023 11:19)  BSA (m2): 1.8 (14 Sep 2023 11:19)    PHYSICAL EXAM:  GENERAL: NAD  EYES: EOMI, PERRLA  NECK: Supple, No JVD; Trachea midline: No LAD   NERVOUS SYSTEM:  Alert & Oriented X3,  Motor Strength 5/5 B/L upper and lower extremities  CHEST/LUNG:  breath sounds present bilaterally, No rales, rhonchi, wheezing  HEART: Regular rate and rhythm; No murmurs, rubs, or gallops  ABDOMEN: Soft, tender to palpation in RUQ and RLL, rounded; Bowel sounds present, no masses on palpation, MARY LOU x2 noted on abdomen with serosanguineous drainage noted.   : voiding well, Prima fit in place   EXTREMITIES:  2+ Peripheral Pulses, No clubbing, cyanosis, or edema  SKIN: generalized ABD LAP sites noted. MARY LOU drain x2, sites c/d/i    LINES/DRAINS/DEVICES  CENTRAL LINE: [ ] YES [ X] NO  LOCATION:     LINARES: [ ] YES [ X] NO     A-LINE:  [ ] YES [x ] NO  LOCATION:       ICU Vital Signs Last 24 Hrs  T(C): 37.5 (16 Sep 2023 04:00), Max: 37.5 (15 Sep 2023 20:00)  T(F): 99.5 (16 Sep 2023 04:00), Max: 99.5 (15 Sep 2023 20:00)  HR: 96 (16 Sep 2023 07:00) (75 - 112)  BP: 142/65 (16 Sep 2023 07:00) (107/97 - 153/64)  BP(mean): 87 (16 Sep 2023 07:00) (66 - 136)  RR: 19 (16 Sep 2023 07:00) (19 - 29)  SpO2: 92% (16 Sep 2023 07:00) (89% - 97%)    O2 Parameters below as of 16 Sep 2023 07:00  Patient On (Oxygen Delivery Method): room air        09-15 @ 07:01  -  09-16 @ 07:00  --------------------------------------------------------  IN: 1699.8 mL / OUT: 900 mL / NET: 799.8 mL      LABS:  CBC Full  -  ( 16 Sep 2023 04:20 )  WBC Count : 13.97 K/uL  RBC Count : 4.06 M/uL  Hemoglobin : 11.7 g/dL  Hematocrit : 35.7 %  Platelet Count - Automated : 272 K/uL  Mean Cell Volume : 87.9 fl  Mean Cell Hemoglobin : 28.8 pg  Mean Cell Hemoglobin Concentration : 32.8 gm/dL  Auto Neutrophil # : 8.87 K/uL  Auto Lymphocyte # : 2.90 K/uL  Auto Monocyte # : 1.01 K/uL  Auto Eosinophil # : 0.14 K/uL  Auto Basophil # : 0.10 K/uL  Auto Neutrophil % : 63.5 %  Auto Lymphocyte % : 20.8 %  Auto Monocyte % : 7.2 %  Auto Eosinophil % : 1.0 %  Auto Basophil % : 0.7 %    09-16    141  |  108  |  7   ----------------------------<  93  3.7   |  28  |  0.56    Ca    7.9<L>      16 Sep 2023 04:20  Phos  2.1     09-16  Mg     2.0     09-16    TPro  5.7<L>  /  Alb  1.9<L>  /  TBili  0.6  /  DBili  x   /  AST  17  /  ALT  20  /  AlkPhos  83  09-16      Urinalysis Basic - ( 16 Sep 2023 04:20 )    Color: x / Appearance: x / SG: x / pH: x  Gluc: 93 mg/dL / Ketone: x  / Bili: x / Urobili: x   Blood: x / Protein: x / Nitrite: x   Leuk Esterase: x / RBC: x / WBC x   Sq Epi: x / Non Sq Epi: x / Bacteria: x          RADIOLOGY & ADDITIONAL STUDIES REVIEWED DURING TEAM ROUNDS    [ ]GOALS OF CARE DISCUSSION WITH PATIENT/FAMILY/PROXY:    CRITICAL CARE TIME SPENT: 35 minutes

## 2023-09-17 ENCOUNTER — TRANSCRIPTION ENCOUNTER (OUTPATIENT)
Age: 73
End: 2023-09-17

## 2023-09-17 LAB
ALBUMIN SERPL ELPH-MCNC: 1.7 G/DL — LOW (ref 3.5–5)
ALP SERPL-CCNC: 81 U/L — SIGNIFICANT CHANGE UP (ref 40–120)
ALT FLD-CCNC: 19 U/L DA — SIGNIFICANT CHANGE UP (ref 10–60)
ANION GAP SERPL CALC-SCNC: 8 MMOL/L — SIGNIFICANT CHANGE UP (ref 5–17)
AST SERPL-CCNC: 16 U/L — SIGNIFICANT CHANGE UP (ref 10–40)
BASOPHILS # BLD AUTO: 0.08 K/UL — SIGNIFICANT CHANGE UP (ref 0–0.2)
BASOPHILS NFR BLD AUTO: 0.6 % — SIGNIFICANT CHANGE UP (ref 0–2)
BILIRUB SERPL-MCNC: 0.5 MG/DL — SIGNIFICANT CHANGE UP (ref 0.2–1.2)
BUN SERPL-MCNC: 5 MG/DL — LOW (ref 7–18)
CALCIUM SERPL-MCNC: 8.3 MG/DL — LOW (ref 8.4–10.5)
CHLORIDE SERPL-SCNC: 106 MMOL/L — SIGNIFICANT CHANGE UP (ref 96–108)
CO2 SERPL-SCNC: 27 MMOL/L — SIGNIFICANT CHANGE UP (ref 22–31)
CREAT SERPL-MCNC: 0.49 MG/DL — LOW (ref 0.5–1.3)
CULTURE RESULTS: SIGNIFICANT CHANGE UP
CULTURE RESULTS: SIGNIFICANT CHANGE UP
EGFR: 99 ML/MIN/1.73M2 — SIGNIFICANT CHANGE UP
EOSINOPHIL # BLD AUTO: 0.14 K/UL — SIGNIFICANT CHANGE UP (ref 0–0.5)
EOSINOPHIL NFR BLD AUTO: 1 % — SIGNIFICANT CHANGE UP (ref 0–6)
GLUCOSE SERPL-MCNC: 84 MG/DL — SIGNIFICANT CHANGE UP (ref 70–99)
HCT VFR BLD CALC: 31.4 % — LOW (ref 34.5–45)
HGB BLD-MCNC: 10.4 G/DL — LOW (ref 11.5–15.5)
IMM GRANULOCYTES NFR BLD AUTO: 9.5 % — HIGH (ref 0–0.9)
LYMPHOCYTES # BLD AUTO: 16.3 % — SIGNIFICANT CHANGE UP (ref 13–44)
LYMPHOCYTES # BLD AUTO: 2.3 K/UL — SIGNIFICANT CHANGE UP (ref 1–3.3)
MAGNESIUM SERPL-MCNC: 2 MG/DL — SIGNIFICANT CHANGE UP (ref 1.6–2.6)
MCHC RBC-ENTMCNC: 29.1 PG — SIGNIFICANT CHANGE UP (ref 27–34)
MCHC RBC-ENTMCNC: 33.1 GM/DL — SIGNIFICANT CHANGE UP (ref 32–36)
MCV RBC AUTO: 87.7 FL — SIGNIFICANT CHANGE UP (ref 80–100)
MONOCYTES # BLD AUTO: 1.41 K/UL — HIGH (ref 0–0.9)
MONOCYTES NFR BLD AUTO: 10 % — SIGNIFICANT CHANGE UP (ref 2–14)
NEUTROPHILS # BLD AUTO: 8.81 K/UL — HIGH (ref 1.8–7.4)
NEUTROPHILS NFR BLD AUTO: 62.6 % — SIGNIFICANT CHANGE UP (ref 43–77)
NRBC # BLD: 0 /100 WBCS — SIGNIFICANT CHANGE UP (ref 0–0)
ORGANISM # SPEC MICROSCOPIC CNT: SIGNIFICANT CHANGE UP
PHOSPHATE SERPL-MCNC: 2.2 MG/DL — LOW (ref 2.5–4.5)
PLATELET # BLD AUTO: 315 K/UL — SIGNIFICANT CHANGE UP (ref 150–400)
POTASSIUM SERPL-MCNC: 4.3 MMOL/L — SIGNIFICANT CHANGE UP (ref 3.5–5.3)
POTASSIUM SERPL-SCNC: 4.3 MMOL/L — SIGNIFICANT CHANGE UP (ref 3.5–5.3)
PROT SERPL-MCNC: 5.5 G/DL — LOW (ref 6–8.3)
RBC # BLD: 3.58 M/UL — LOW (ref 3.8–5.2)
RBC # FLD: 14.4 % — SIGNIFICANT CHANGE UP (ref 10.3–14.5)
SODIUM SERPL-SCNC: 141 MMOL/L — SIGNIFICANT CHANGE UP (ref 135–145)
SPECIMEN SOURCE: SIGNIFICANT CHANGE UP
SPECIMEN SOURCE: SIGNIFICANT CHANGE UP
WBC # BLD: 14.08 K/UL — HIGH (ref 3.8–10.5)
WBC # FLD AUTO: 14.08 K/UL — HIGH (ref 3.8–10.5)

## 2023-09-17 RX ORDER — ACETAMINOPHEN 500 MG
650 TABLET ORAL EVERY 6 HOURS
Refills: 0 | Status: DISCONTINUED | OUTPATIENT
Start: 2023-09-17 | End: 2023-09-22

## 2023-09-17 RX ORDER — POTASSIUM PHOSPHATE, MONOBASIC POTASSIUM PHOSPHATE, DIBASIC 236; 224 MG/ML; MG/ML
30 INJECTION, SOLUTION INTRAVENOUS ONCE
Refills: 0 | Status: COMPLETED | OUTPATIENT
Start: 2023-09-17 | End: 2023-09-17

## 2023-09-17 RX ADMIN — Medication 100 MILLIGRAM(S): at 13:43

## 2023-09-17 RX ADMIN — Medication 100 MILLIGRAM(S): at 21:17

## 2023-09-17 RX ADMIN — Medication 150 MILLIGRAM(S): at 06:15

## 2023-09-17 RX ADMIN — HEPARIN SODIUM 5000 UNIT(S): 5000 INJECTION INTRAVENOUS; SUBCUTANEOUS at 06:14

## 2023-09-17 RX ADMIN — Medication 150 MILLIGRAM(S): at 13:43

## 2023-09-17 RX ADMIN — FAMOTIDINE 20 MILLIGRAM(S): 10 INJECTION INTRAVENOUS at 12:03

## 2023-09-17 RX ADMIN — HEPARIN SODIUM 5000 UNIT(S): 5000 INJECTION INTRAVENOUS; SUBCUTANEOUS at 21:17

## 2023-09-17 RX ADMIN — Medication 100 MILLIGRAM(S): at 06:16

## 2023-09-17 RX ADMIN — Medication 650 MILLIGRAM(S): at 21:18

## 2023-09-17 RX ADMIN — PIPERACILLIN AND TAZOBACTAM 25 GRAM(S): 4; .5 INJECTION, POWDER, LYOPHILIZED, FOR SOLUTION INTRAVENOUS at 17:22

## 2023-09-17 RX ADMIN — PIPERACILLIN AND TAZOBACTAM 25 GRAM(S): 4; .5 INJECTION, POWDER, LYOPHILIZED, FOR SOLUTION INTRAVENOUS at 06:15

## 2023-09-17 RX ADMIN — POTASSIUM PHOSPHATE, MONOBASIC POTASSIUM PHOSPHATE, DIBASIC 83.33 MILLIMOLE(S): 236; 224 INJECTION, SOLUTION INTRAVENOUS at 12:03

## 2023-09-17 RX ADMIN — CHLORHEXIDINE GLUCONATE 1 APPLICATION(S): 213 SOLUTION TOPICAL at 06:25

## 2023-09-17 RX ADMIN — Medication 650 MILLIGRAM(S): at 22:13

## 2023-09-17 RX ADMIN — HEPARIN SODIUM 5000 UNIT(S): 5000 INJECTION INTRAVENOUS; SUBCUTANEOUS at 13:44

## 2023-09-17 RX ADMIN — Medication 150 MILLIGRAM(S): at 21:17

## 2023-09-17 NOTE — DISCHARGE NOTE PROVIDER - NSDCFUADDINST_GEN_ALL_CORE_FT
You just had surgery, appendectomy and to wash out your abdomen with drain placement due to perforated appendicitis. Given the significant inflammatory changes from this appendicitis. Now that you are feeling well enough for discharge, you will be discharged home with oral antibiotic. The drain was removed and you have a gauze and tape that can be changed as needed until its dry for one day.     Your surgical incisions are covered with thin white bandages strips. Please keep these in place as they will fall off on their own within 10 days. You may shower normally over the dressings and drain, taking care to avoid rubbing or pulling on the drain or incisions. Please pat to dry - do NOT rub. The drain sites are covered with gauze and plastic, change them every day or earlier if wet, can remove if dry for >1 day.     You will go home with the intraabdominal drain in place - your bedside nurses will instruct you on drain care and recording outputs prior to your discharge. Please keep track of the outputs of the drain twice daily and bring this data with you to your outpatient follow up appointment with Dr. Sunshine.    Upon discharge, you will need to continue taking the 2 oral antibiotics (Levofloxacin and Flagyl) for 10 additional days to ensure complete resolution of infection.    It is normal to have some post-operative pain after surgery. For pain, please take Tylenol 650mg every 6 hours alternating with Ibuprofen 400mg every 6 hours.     Please call the phone number listed in this discharge paperwork to schedule a routine post-operative clinic visit with Dr. Sunshine within 7-10 days of surgery. Additionally, please see your primary care physician within 2 weeks of surgery to update him/her on your procedure.    Please do not lifting anything heavy (>15lbs) for the next 4 weeks to prevent hernia formation.

## 2023-09-17 NOTE — DISCHARGE NOTE PROVIDER - NSDCCPCAREPLAN_GEN_ALL_CORE_FT
PRINCIPAL DISCHARGE DIAGNOSIS  Diagnosis: Acute perforated appendicitis  Assessment and Plan of Treatment:

## 2023-09-17 NOTE — DISCHARGE NOTE PROVIDER - ATTENDING DISCHARGE PHYSICAL EXAMINATION:
Vital Signs Last 24 Hrs  T(C): 36.7 (22 Sep 2023 05:21), Max: 37.3 (21 Sep 2023 20:45)  T(F): 98 (22 Sep 2023 05:21), Max: 99.1 (21 Sep 2023 20:45)  HR: 81 (22 Sep 2023 05:21) (81 - 99)  BP: 158/72 (22 Sep 2023 05:21) (128/70 - 163/80)  BP(mean): 108 (21 Sep 2023 20:45) (108 - 108)  RR: 17 (22 Sep 2023 05:21) (17 - 18)  SpO2: 91% (22 Sep 2023 05:21) (91% - 92%)  O2 Parameters below as of 22 Sep 2023 05:21  Patient On (Oxygen Delivery Method): room air    Physical Examination:  Gen: Awake, alert, oriented x3 and in no acute distress  Pulm: Normal work of breathing on room air, no respiratory distress  Abd: Soft, non-distended, no tenderness. No guarding, rigidity, or rebound.  RLQ pigtail drain yellowish output. Incision sites CDI, drain sites with guaze and tegaderm.   Ext: Moving all extremities equally  Neuro: GCS 15, no focal deficits, mental status markedly improved compared to initial delirium

## 2023-09-17 NOTE — PROGRESS NOTE ADULT - SUBJECTIVE AND OBJECTIVE BOX
73y Female is under our care for sepsis, acute appendicitis with perforation, generalized peritonitis, leukocytosis, and bacteremia with Bacteroides. Patient was downgraded to the floor yesterday evening. She continue to do well and admits dysuria has resolved. She was able to have another BM overnight. Wbc count has been slowly increasing, but remains afebrile.     MEDS:  piperacillin/tazobactam IVPB.. 3.375 Gram(s) IV Intermittent every 8 hours    ALLERGIES: Allergies    sulfa drugs (Unknown)    Intolerances    REVIEW OF SYSTEMS:  [  ] Not able to elicit  General: no fevers no malaise  Chest: no cough no sob  GI: no nvd +improving abdominal pain +BM  Skin: no rashes  Musculoskeletal: no trauma no LBP  Neuro: no ha's no dizziness     VITALS:  Vital Signs Last 24 Hrs  T(C): 36.8 (09-17-23 @ 04:53), Max: 37.6 (09-16-23 @ 16:00)  T(F): 98.3 (09-17-23 @ 04:53), Max: 99.6 (09-16-23 @ 16:00)  HR: 102 (09-17-23 @ 04:53) (77 - 108)  BP: 161/75 (09-17-23 @ 04:53) (95/82 - 161/75)  BP(mean): 99 (09-16-23 @ 20:25) (68 - 99)  RR: 17 (09-17-23 @ 04:53) (17 - 31)  SpO2: 94% (09-17-23 @ 04:53) (90% - 95%)    PHYSICAL EXAM:  HEENT: n/a  Neck: supple no LN's   Respiratory: lungs clear no rales  Cardiovascular: S1 S2 tachy no murmurs  Gastrointestinal: +BS with soft, nondistended abdomen; RLQ abdominal tenderness   +drainage cath x 2, both draining serous fluid  Extremities: no edema  Skin: no rashes  Ortho: mild kyphosis  Neuro: awake and alert    LABS/DIAGNOSTIC TESTS:                         10.4   14.08 )-----------( 315      ( 17 Sep 2023 06:50 )             31.4   WBC Count: 14.08 K/uL (09-17 @ 06:50)  WBC Count: 13.97 K/uL (09-16 @ 04:20)  WBC Count: 13.55 K/uL (09-15 @ 04:50)  WBC Count: 12.04 K/uL (09-14 @ 03:20)  WBC Count: 7.52 K/uL (09-13 @ 03:50)    09-17    141  |  106  |  5<L>  ----------------------------<  84  4.3   |  27  |  0.49<L>    Ca    8.3<L>      17 Sep 2023 06:50  Phos  2.2     09-17  Mg     2.0     09-17    TPro  5.5<L>  /  Alb  1.7<L>  /  TBili  0.5  /  DBili  x   /  AST  16  /  ALT  19  /  AlkPhos  81  09-17        CULTURES:   .Blood Blood-Peripheral  09-13 @ 03:59   No growth at 72 Hours  --  --      .Blood Blood-Peripheral  09-13 @ 03:50   No growth at 72 Hours  --  --      Abdominal Fl Abdominal Fluid  09-11 @ 22:00   Culture is being performed.  --  Escherichia coli  Escherichia coli      Clean Catch Clean Catch (Midstream)  09-11 @ 15:00   No growth  --  --      .Blood Blood-Peripheral  09-11 @ 12:50   Growth in anaerobic bottle: Gram Negative Rods  --    Growth in anaerobic bottle: Gram Negative Rods      .Blood Blood-Peripheral  09-11 @ 12:45   Growth in anaerobic bottle: Bacteroides fragilis "Susceptibilities not  performed"  Direct identification is available within approximately 3-5  hours either by Blood Panel Multiplexed PCR or Direct  MALDI-TOF. Details: [email or web address]  --  Blood Culture PCR        RADIOLOGY:  no new studies

## 2023-09-17 NOTE — DISCHARGE NOTE PROVIDER - HOSPITAL COURSE
Patient is a 74 yo female with hx of COPD, not on home oxygen, neuropathy presents with abdominal pain. Upon evaluation in ED, patient noted with temp of 101.8, BP of 127/66, saturating 91% on Room air with hr of 124bpm. Physical examination noted with diffuse tenderness in abdominal region with guarding. Labs significant for leukocytosis of 13, lactate of 3.5 and scr of 1.41. CT abdomen and pelvis noted for free air in peritoneum and findings for perforated acute appendicitis with fidings of 6mm gall bladder lesion and right adnexal lesion. CXR negative. Surgery consulted and patient is scheduled for urgent ex lap, abdominal washout and possible bowel resection. ICU consulted for sepsis 2/2 perforated appendicitis. Patient admitted to ICU for sepsis 2/2 acute appendicitis. Patient was taken to the OR on 9/11 for a laparoscopic appendectomy found to have a perforated, gangrenous appendix with peritonitis, two MARY LOU drains were placed. Patient was monitored in the ICU postoperatively. Patient spiked fevers, and remained on IV antibiotics during admission. Diet was slowly advanced as tolerated. Patient was downgraded to the floor once she remained HDS. Patient is a 74 yo female with hx of COPD, not on home oxygen, neuropathy presents with abdominal pain. Upon evaluation in ED, patient noted with temp of 101.8, BP of 127/66, saturating 91% on Room air with hr of 124bpm. Physical examination noted with diffuse tenderness in abdominal region with guarding. Labs significant for leukocytosis of 13, lactate of 3.5 and scr of 1.41. CT abdomen and pelvis noted for free air in peritoneum and findings for perforated acute appendicitis with 6mm gall bladder lesion and right adnexal lesion. CXR negative. Surgery consulted and patient is scheduled for urgent ex lap, abdominal washout and possible bowel resection. ICU consulted for sepsis 2/2 perforated appendicitis. Patient admitted to ICU for sepsis 2/2 acute appendicitis. Patient was taken to the OR on 9/11 for a laparoscopic appendectomy found to have a perforated, gangrenous appendix with peritonitis, two MARY LOU drains were placed. Patient was monitored in the ICU postoperatively. Patient spiked fevers, and remained on IV antibiotics during admission. Diet was slowly advanced as tolerated. Patient was downgraded to the floor once she remained HDS. WBC remained elevated and RLQ pain. CT was done showing RLQ fluid collection, underwent IR drainage and pigtail drain placement. Patient continued with IV antibiotic, tolerating diet, having BM and passing gas, pain controlled. MARY LOU drains were removed. Patient did meet discharge criteria, plan to go home with home health, pigtail drain care, PO antibiotics.

## 2023-09-17 NOTE — DISCHARGE NOTE PROVIDER - CARE PROVIDER_API CALL
Keila Sunshine  Surgery  [address], [address]  [place] [address]  Phone: [phone]  Fax: [phone]  Follow Up Time: 1 week

## 2023-09-17 NOTE — PROGRESS NOTE ADULT - ASSESSMENT
76-year-old-female presented with sepsis/bacteremia due to perforated appendicitis, s/p lap appendectomy and washout and 2 MARY LOU drain placement. Transferred back to SICU for resuscitation and monitoring.      PLAN  - Continue diet as tolerated  - Monitor I/Os   - MARY LOU care, dressing changes PRN  - Continue ABX, ID recommendations appreciated  - OOB/ambulate as tolerated   - DVT ppx  - Appreciate medicine recommendations 76-year-old-female presented with sepsis/bacteremia due to perforated appendicitis, s/p lap appendectomy and washout and 2 MARY LOU drain placement on 9/11    PLAN  - Continue diet as tolerated  - Monitor I/Os   - MARY LOU care, dressing changes PRN  - Continue ABX, ID recommendations appreciated  - OOB/ambulate as tolerated   - DVT ppx  - Appreciate medicine recommendations

## 2023-09-17 NOTE — PROGRESS NOTE ADULT - ASSESSMENT
Acute Appendicitis with Perforation  Generalized Peritonitis  Leukocytosis - mild  Bacteremia with Bacteroides - cultures are neg    Plan:  ·	Cont Zosyn 3.375 gms iv q8hrs  D7  ·	when ready for dc will be switched to PO antibiotics  Acute Appendicitis with Perforation  Generalized Peritonitis  Leukocytosis - slowly increasing  Bacteremia with Bacteroides - cultures are neg    Plan:  ·	Cont Zosyn 3.375 gms iv q8hrs  D7  ·	monitoring wbc count for now  ·	when ready for dc will switch to ceftin 500mg PO BID and flagyl 500mg PO TID both until 9/26/23.

## 2023-09-17 NOTE — PROGRESS NOTE ADULT - SUBJECTIVE AND OBJECTIVE BOX
INTERVAL HPI/OVERNIGHT EVENTS:  Patient seen,doing better,was transferred to regular floor  VITAL SIGNS:  T(F): 98.3 (09-17-23 @ 04:53)  HR: 102 (09-17-23 @ 04:53)  BP: 161/75 (09-17-23 @ 04:53)  RR: 17 (09-17-23 @ 04:53)  SpO2: 94% (09-17-23 @ 04:53)  Wt(kg): --    PHYSICAL EXAM:  awake  Constitutional:  Eyes:  ENMT:perrla  Neck:  Respiratory:clear  Cardiovascular:s1s2,m-none  Gastrointestinal:soft,bs pos,mild tenderness  Extremities:  Vascular:  Neurological:no focal deficit  Musculoskeletal:    MEDICATIONS  (STANDING):  chlorhexidine 2% Cloths 1 Application(s) Topical <User Schedule>  famotidine    Tablet 20 milliGRAM(s) Oral daily  heparin   Injectable 5000 Unit(s) SubCutaneous every 8 hours  phenazopyridine 100 milliGRAM(s) Oral every 8 hours  piperacillin/tazobactam IVPB.. 3.375 Gram(s) IV Intermittent every 8 hours  pregabalin 150 milliGRAM(s) Oral three times a day    MEDICATIONS  (PRN):  morphine  - Injectable 2 milliGRAM(s) IV Push every 6 hours PRN Severe Pain (7 - 10)      Allergies    sulfa drugs (Unknown)    Intolerances        LABS:                        10.4   14.08 )-----------( 315      ( 17 Sep 2023 06:50 )             31.4     09-17    141  |  106  |  5<L>  ----------------------------<  84  4.3   |  27  |  0.49<L>    Ca    8.3<L>      17 Sep 2023 06:50  Phos  2.2     09-17  Mg     2.0     09-17    TPro  5.5<L>  /  Alb  1.7<L>  /  TBili  0.5  /  DBili  x   /  AST  16  /  ALT  19  /  AlkPhos  81  09-17      Urinalysis Basic - ( 17 Sep 2023 06:50 )    Color: x / Appearance: x / SG: x / pH: x  Gluc: 84 mg/dL / Ketone: x  / Bili: x / Urobili: x   Blood: x / Protein: x / Nitrite: x   Leuk Esterase: x / RBC: x / WBC x   Sq Epi: x / Non Sq Epi: x / Bacteria: x        RADIOLOGY & ADDITIONAL TESTS:      Assessment and Plan:   · Assessment	  · Assessment    Patient is a 72 yo female with hx of COPD, not on home oxygen, neuropathy presents with abdominal pain. Upon evaluation in ED, patient noted with temp of 101.8, BP of 127/66, saturating 91% on Room air with hr of 124bpm. Physical examination noted with diffuse tenderness in abdominal region with guarding. Labs significant for leukocytosis of 13, lactate of 3.5 and scr of 1.41. CT abdomen and pelvis noted for free air in peritoneum and findings for perforated acute appendicitis with fidings of 6mm gall bladder lesion and right adnexal lesion. CXR negative. Surgery consulted and patient is scheduled for urgent ex lap, abdominal washout and possible bowel resection. ICU consulted for sepsis 2/2 perforated appendicitis. Patient admitted to ICU for sepsis 2/2 acute appendicitis.     #Acute Encephalopathy   #Sepsis  #Acute perforated appendicitis  #COPD  #Neuropathy  # Elevated lactate  # Gall bladder wall lesion   # MAYRA  #RIght Adnexal Lesion    ============Pulmonology===========  # Hx of COPD -stable  - patient with hx of COPD   - Chest X-ray shows same findings as previous, increased central interstitial markings.    =========GI=============  # Sepsis 2/2 Perforated acute appendicitis   - noted with findings of acute perforated appendicitis on CT scan  - Pt underwent laparoscopic appendectomy and washout  - peritoneum full of feculent pus according to surgeon, Dr. Sunshine  - hemodynamically stable, not requiring pressors   - IVF hydration   - continue with zosyn   - ID consulted Dr. Moffett   - Pain control with morphine  - Clear liquid diet    #Gallbladder wall lesion  - patient on CT noted with Focal 6 mm enhancing lesion at the gallbladder fundal wall  - Consider MR abdomen when clinically stable      ==========Renal==========  #MAYRA  - patient noted to have Scr of 1.41 on admission  - avoid nephrotoxic agents  - Cr trended down    =========Infectious Disease===========  # Sepsis 2/2 Perforated acute appendicitis   - noted with findings of acute perforated appendicitis on CT scan  - hemodynamically stable, not requiring pressors   - Clear liquid diet  - IVF hydration   - s/p diagnostic laparoscopy with appendectomy and washout  - trend lactate  - ID consulted Dr. Moffett   - continue with zosyn   - will f/u for sensitivities    =========Reproductive===========  #Right Adnexal lesion  - CT abdomen showing 5.2 cm soft tissue density structure in the right adnexa may represent an exophytic uterine fibroid, or a right ovarian mass  - f/u pelvic US when stable     ========Prophylaxis=======  - heparin subq    Code Status: Full Code

## 2023-09-17 NOTE — PROGRESS NOTE ADULT - SUBJECTIVE AND OBJECTIVE BOX
INTERVAL HPI/OVERNIGHT EVENTS: NAEO. AVSS. Patient seen and examined at bedside. Tolerating diet. Denies pain, nausea, emesis, fevers, chills.     Vital Signs Last 24 Hrs  T(C): 36.8 (17 Sep 2023 04:53), Max: 37.6 (16 Sep 2023 16:00)  T(F): 98.3 (17 Sep 2023 04:53), Max: 99.6 (16 Sep 2023 16:00)  HR: 102 (17 Sep 2023 04:53) (74 - 108)  BP: 161/75 (17 Sep 2023 04:53) (95/82 - 161/75)  BP(mean): 99 (16 Sep 2023 20:25) (68 - 99)  RR: 17 (17 Sep 2023 04:53) (17 - 31)  SpO2: 94% (17 Sep 2023 04:53) (90% - 95%)    Parameters below as of 17 Sep 2023 04:53  Patient On (Oxygen Delivery Method): room air      I&O's Detail    16 Sep 2023 07:01  -  17 Sep 2023 07:00  --------------------------------------------------------  IN:    IV PiggyBack: 100 mL    Oral Fluid: 1290 mL  Total IN: 1390 mL    OUT:    Bulb (mL): 65 mL    Bulb (mL): 45 mL    Indwelling Catheter - Urethral (mL): 300 mL    Stool (mL): 1 mL    Voided (mL): 300 mL  Total OUT: 711 mL    Total NET: 679 mL        famotidine    Tablet 20 milliGRAM(s) Oral daily  phenazopyridine 100 milliGRAM(s) Oral every 8 hours  piperacillin/tazobactam IVPB.. 3.375 Gram(s) IV Intermittent every 8 hours      Physical Exam:  General: AAOx3, No acute distress  HEENT: NC/AT, trachea midline  Respiratory: Nonlabored breathing, equal chest rise b/l   Skin: No jaundice, no icterus  Abdomen: soft,  nondistended, nontender. Incision sites with steri strips c/d/i. JPx2 with serous output.   Extremities: non edematous, no calf pain bilaterally  Lines/Drains/Tubes:     Drains/Tubes:     09-16-23 @ 07:01  -  09-17-23 @ 07:00  --------------------------------------------------------  IN: 1390 mL / OUT: 711 mL / NET: 679 mL        Labs:                        10.4   14.08 )-----------( 315      ( 17 Sep 2023 06:50 )             31.4     09-17    141  |  106  |  5<L>  ----------------------------<  84  4.3   |  27  |  0.49<L>    Ca    8.3<L>      17 Sep 2023 06:50  Phos  2.2     09-17  Mg     2.0     09-17    TPro  5.5<L>  /  Alb  1.7<L>  /  TBili  0.5  /  DBili  x   /  AST  16  /  ALT  19  /  AlkPhos  81  09-17        RADIOLOGY & ADDITIONAL STUDIES:

## 2023-09-17 NOTE — DISCHARGE NOTE PROVIDER - PROVIDER TOKENS
Telephone Encounter by Grupo Yan DO at 01/03/17 07:19 AM     Author:  Grupo Yan DO Service:  (none) Author Type:  Physician     Filed:  01/03/17 07:19 AM Encounter Date:  12/30/2016 Status:  Signed     :  Grupo Yan DO (Physician)            see other telephone message. Okay to order quantiferon gold.[RP1.1M]       Revision History        User Key Date/Time User Provider Type Action    > RP1.1 01/03/17 07:19 AM Grupo Yan DO Physician Sign    M - Manual             PROVIDER:[TOKEN:[[id number]],FOLLOWUP:[1 week]]

## 2023-09-17 NOTE — DISCHARGE NOTE PROVIDER - NSDCMRMEDTOKEN_GEN_ALL_CORE_FT
amLODIPine 5 mg oral tablet: 1 tab(s) orally once a day  lisinopril 20 mg oral tablet: 1 tab(s) orally once a day  pregabalin 150 mg oral capsule: 1 cap(s) orally 3 times a day   amLODIPine 5 mg oral tablet: 1 tab(s) orally once a day  levoFLOXacin 500 mg oral tablet: 1 tab(s) orally once a day  lisinopril 20 mg oral tablet: 1 tab(s) orally once a day  metroNIDAZOLE 500 mg oral tablet: 1 tab(s) orally every 8 hours MDD: 3  pregabalin 150 mg oral capsule: 1 cap(s) orally 3 times a day

## 2023-09-18 LAB
ALBUMIN SERPL ELPH-MCNC: 1.9 G/DL — LOW (ref 3.5–5)
ALP SERPL-CCNC: 96 U/L — SIGNIFICANT CHANGE UP (ref 40–120)
ALT FLD-CCNC: 21 U/L DA — SIGNIFICANT CHANGE UP (ref 10–60)
ANION GAP SERPL CALC-SCNC: 6 MMOL/L — SIGNIFICANT CHANGE UP (ref 5–17)
ANISOCYTOSIS BLD QL: SLIGHT — SIGNIFICANT CHANGE UP
AST SERPL-CCNC: 19 U/L — SIGNIFICANT CHANGE UP (ref 10–40)
BASOPHILS # BLD AUTO: 0 K/UL — SIGNIFICANT CHANGE UP (ref 0–0.2)
BASOPHILS NFR BLD AUTO: 0 % — SIGNIFICANT CHANGE UP (ref 0–2)
BILIRUB SERPL-MCNC: 0.6 MG/DL — SIGNIFICANT CHANGE UP (ref 0.2–1.2)
BUN SERPL-MCNC: 4 MG/DL — LOW (ref 7–18)
CALCIUM SERPL-MCNC: 8.4 MG/DL — SIGNIFICANT CHANGE UP (ref 8.4–10.5)
CHLORIDE SERPL-SCNC: 109 MMOL/L — HIGH (ref 96–108)
CO2 SERPL-SCNC: 29 MMOL/L — SIGNIFICANT CHANGE UP (ref 22–31)
CREAT SERPL-MCNC: 0.5 MG/DL — SIGNIFICANT CHANGE UP (ref 0.5–1.3)
CULTURE RESULTS: SIGNIFICANT CHANGE UP
CULTURE RESULTS: SIGNIFICANT CHANGE UP
EGFR: 98 ML/MIN/1.73M2 — SIGNIFICANT CHANGE UP
EOSINOPHIL # BLD AUTO: 0.26 K/UL — SIGNIFICANT CHANGE UP (ref 0–0.5)
EOSINOPHIL NFR BLD AUTO: 2 % — SIGNIFICANT CHANGE UP (ref 0–6)
GLUCOSE SERPL-MCNC: 84 MG/DL — SIGNIFICANT CHANGE UP (ref 70–99)
HCT VFR BLD CALC: 32.9 % — LOW (ref 34.5–45)
HGB BLD-MCNC: 10.7 G/DL — LOW (ref 11.5–15.5)
LYMPHOCYTES # BLD AUTO: 18 % — SIGNIFICANT CHANGE UP (ref 13–44)
LYMPHOCYTES # BLD AUTO: 2.35 K/UL — SIGNIFICANT CHANGE UP (ref 1–3.3)
MAGNESIUM SERPL-MCNC: 2.2 MG/DL — SIGNIFICANT CHANGE UP (ref 1.6–2.6)
MANUAL SMEAR VERIFICATION: SIGNIFICANT CHANGE UP
MCHC RBC-ENTMCNC: 28.5 PG — SIGNIFICANT CHANGE UP (ref 27–34)
MCHC RBC-ENTMCNC: 32.5 GM/DL — SIGNIFICANT CHANGE UP (ref 32–36)
MCV RBC AUTO: 87.7 FL — SIGNIFICANT CHANGE UP (ref 80–100)
METAMYELOCYTES # FLD: 1 % — HIGH (ref 0–0)
MONOCYTES # BLD AUTO: 0.91 K/UL — HIGH (ref 0–0.9)
MONOCYTES NFR BLD AUTO: 7 % — SIGNIFICANT CHANGE UP (ref 2–14)
MYELOCYTES NFR BLD: 8 % — HIGH (ref 0–0)
NEUTROPHILS # BLD AUTO: 8.35 K/UL — HIGH (ref 1.8–7.4)
NEUTROPHILS NFR BLD AUTO: 64 % — SIGNIFICANT CHANGE UP (ref 43–77)
NRBC # BLD: 0 /100 — SIGNIFICANT CHANGE UP (ref 0–0)
PHOSPHATE SERPL-MCNC: 2.6 MG/DL — SIGNIFICANT CHANGE UP (ref 2.5–4.5)
PLAT MORPH BLD: NORMAL — SIGNIFICANT CHANGE UP
PLATELET # BLD AUTO: 378 K/UL — SIGNIFICANT CHANGE UP (ref 150–400)
PLATELET COUNT - ESTIMATE: NORMAL — SIGNIFICANT CHANGE UP
POIKILOCYTOSIS BLD QL AUTO: SLIGHT — SIGNIFICANT CHANGE UP
POTASSIUM SERPL-MCNC: 3.8 MMOL/L — SIGNIFICANT CHANGE UP (ref 3.5–5.3)
POTASSIUM SERPL-SCNC: 3.8 MMOL/L — SIGNIFICANT CHANGE UP (ref 3.5–5.3)
PROT SERPL-MCNC: 6.1 G/DL — SIGNIFICANT CHANGE UP (ref 6–8.3)
RBC # BLD: 3.75 M/UL — LOW (ref 3.8–5.2)
RBC # FLD: 14.6 % — HIGH (ref 10.3–14.5)
RBC BLD AUTO: ABNORMAL
SODIUM SERPL-SCNC: 144 MMOL/L — SIGNIFICANT CHANGE UP (ref 135–145)
SPECIMEN SOURCE: SIGNIFICANT CHANGE UP
SPECIMEN SOURCE: SIGNIFICANT CHANGE UP
WBC # BLD: 13.04 K/UL — HIGH (ref 3.8–10.5)
WBC # FLD AUTO: 13.04 K/UL — HIGH (ref 3.8–10.5)

## 2023-09-18 PROCEDURE — 74177 CT ABD & PELVIS W/CONTRAST: CPT | Mod: 26

## 2023-09-18 RX ORDER — DIATRIZOATE MEGLUMINE 180 MG/ML
30 INJECTION, SOLUTION INTRAVESICAL ONCE
Refills: 0 | Status: COMPLETED | OUTPATIENT
Start: 2023-09-18 | End: 2023-09-18

## 2023-09-18 RX ORDER — IOHEXOL 300 MG/ML
30 INJECTION, SOLUTION INTRAVENOUS ONCE
Refills: 0 | Status: DISCONTINUED | OUTPATIENT
Start: 2023-09-18 | End: 2023-09-18

## 2023-09-18 RX ADMIN — DIATRIZOATE MEGLUMINE 30 MILLILITER(S): 180 INJECTION, SOLUTION INTRAVESICAL at 13:16

## 2023-09-18 RX ADMIN — Medication 100 MILLIGRAM(S): at 22:18

## 2023-09-18 RX ADMIN — CHLORHEXIDINE GLUCONATE 1 APPLICATION(S): 213 SOLUTION TOPICAL at 06:10

## 2023-09-18 RX ADMIN — Medication 100 MILLIGRAM(S): at 16:12

## 2023-09-18 RX ADMIN — FAMOTIDINE 20 MILLIGRAM(S): 10 INJECTION INTRAVENOUS at 12:31

## 2023-09-18 RX ADMIN — HEPARIN SODIUM 5000 UNIT(S): 5000 INJECTION INTRAVENOUS; SUBCUTANEOUS at 16:12

## 2023-09-18 RX ADMIN — Medication 150 MILLIGRAM(S): at 16:11

## 2023-09-18 RX ADMIN — PIPERACILLIN AND TAZOBACTAM 25 GRAM(S): 4; .5 INJECTION, POWDER, LYOPHILIZED, FOR SOLUTION INTRAVENOUS at 01:33

## 2023-09-18 RX ADMIN — HEPARIN SODIUM 5000 UNIT(S): 5000 INJECTION INTRAVENOUS; SUBCUTANEOUS at 22:17

## 2023-09-18 RX ADMIN — Medication 150 MILLIGRAM(S): at 22:17

## 2023-09-18 RX ADMIN — Medication 100 MILLIGRAM(S): at 06:10

## 2023-09-18 RX ADMIN — PIPERACILLIN AND TAZOBACTAM 25 GRAM(S): 4; .5 INJECTION, POWDER, LYOPHILIZED, FOR SOLUTION INTRAVENOUS at 08:15

## 2023-09-18 RX ADMIN — HEPARIN SODIUM 5000 UNIT(S): 5000 INJECTION INTRAVENOUS; SUBCUTANEOUS at 06:10

## 2023-09-18 RX ADMIN — Medication 150 MILLIGRAM(S): at 06:10

## 2023-09-18 RX ADMIN — PIPERACILLIN AND TAZOBACTAM 25 GRAM(S): 4; .5 INJECTION, POWDER, LYOPHILIZED, FOR SOLUTION INTRAVENOUS at 16:13

## 2023-09-18 NOTE — CHART NOTE - NSCHARTNOTEFT_GEN_A_CORE
Assessment:   Patient is a 73y old  Female who presents with a chief complaint of Sepsis 2/2 Perforated Appendicitis (18 Sep 2023 11:04). Pt D/G from ICU. Diet advanced to solids (see below), noted as tolerating. While in room visiting another pt, sister reports pt has not received visit for menu selection. Pt's sister given  business card and called kitchen, spoke to diet tech. Returned to see if everything OK. Both stated satisfaction, voiced appreciation.         Diet Prescription: Diet, DASH/TLC:   Sodium & Cholesterol Restricted (23 @ 11:52)      Daily     Daily Weight in k.8 (17 Sep 2023 04:53)  Weight in k (16 Sep 2023 07:00)  Weight in k (15 Sep 2023 08:00)  Weight in k.5 (14 Sep 2023 07:00)  Weight in k.2 (12 Sep 2023 07:00)  Weight in k.3 (11 Sep 2023 17:49)        Pertinent Medications: MEDICATIONS  (STANDING):  chlorhexidine 2% Cloths 1 Application(s) Topical <User Schedule>  famotidine    Tablet 20 milliGRAM(s) Oral daily  heparin   Injectable 5000 Unit(s) SubCutaneous every 8 hours  phenazopyridine 100 milliGRAM(s) Oral every 8 hours  piperacillin/tazobactam IVPB.. 3.375 Gram(s) IV Intermittent every 8 hours  pregabalin 150 milliGRAM(s) Oral three times a day    MEDICATIONS  (PRN):  acetaminophen     Tablet .. 650 milliGRAM(s) Oral every 6 hours PRN Temp greater or equal to 38C (100.4F), Mild Pain (1 - 3)  morphine  - Injectable 2 milliGRAM(s) IV Push every 6 hours PRN Severe Pain (7 - 10)    Pertinent Labs:  Na144 mmol/L Glu 84 mg/dL K+ 3.8 mmol/L Cr  0.50 mg/dL BUN 4 mg/dL<L>  Phos 2.6 mg/dL  Alb 1.9 g/dL<L>     CAPILLARY BLOOD GLUCOSE        Estimated Needs:   [x ] no change since previous assessment  [ ] recalculated:       Previous Nutrition Diagnosis:   [ ] Altered GI function  [x ]Inadequate Oral Intake [ ] Swallowing Difficulty   [ ] Altered nutrition related labs [ ] Increased Nutrient Needs [ ] Overweight/Obesity   [ ] Unintended Weight Loss [ ] Food & Nutrition Related Knowledge Deficit [ ] Malnutrition   [ ] Other:     Nutrition Diagnosis is  [x ] resolving/ resolve w/ diet advancement, tolerance and good appetite reported.      Interventions:   Recommend  [ ] Change Diet To:  [ ] Nutrition Supplement  [ ] Nutrition Support  [x ] Other: MD to monitor. RD available.     Monitoring and Evaluation:   [x ] PO intake [ x ] Tolerance to diet prescription [ x ] weights [ x ] labs[ x ] follow up per protocol  [ ] other:

## 2023-09-18 NOTE — PROGRESS NOTE ADULT - SUBJECTIVE AND OBJECTIVE BOX
73y Female    Meds:  piperacillin/tazobactam IVPB.. 3.375 Gram(s) IV Intermittent every 8 hours    Allergies    sulfa drugs (Unknown)    Intolerances        VITALS:  Vital Signs Last 24 Hrs  T(C): 36.8 (18 Sep 2023 12:30), Max: 38 (17 Sep 2023 20:42)  T(F): 98.2 (18 Sep 2023 12:30), Max: 100.4 (17 Sep 2023 20:42)  HR: 102 (18 Sep 2023 12:30) (80 - 104)  BP: 102/64 (18 Sep 2023 12:30) (102/64 - 166/83)  BP(mean): 100 (17 Sep 2023 20:42) (100 - 100)  RR: 18 (18 Sep 2023 12:30) (17 - 92)  SpO2: 93% (18 Sep 2023 12:30) (93% - 94%)    Parameters below as of 18 Sep 2023 12:30  Patient On (Oxygen Delivery Method): nasal cannula  O2 Flow (L/min): 2      LABS/DIAGNOSTIC TESTS:                          10.7   13.04 )-----------( 378      ( 18 Sep 2023 07:25 )             32.9         09-18    144  |  109<H>  |  4<L>  ----------------------------<  84  3.8   |  29  |  0.50    Ca    8.4      18 Sep 2023 07:25  Phos  2.6     09-18  Mg     2.2     09-18    TPro  6.1  /  Alb  1.9<L>  /  TBili  0.6  /  DBili  x   /  AST  19  /  ALT  21  /  AlkPhos  96  09-18      LIVER FUNCTIONS - ( 18 Sep 2023 07:25 )  Alb: 1.9 g/dL / Pro: 6.1 g/dL / ALK PHOS: 96 U/L / ALT: 21 U/L DA / AST: 19 U/L / GGT: x             CULTURES: .Blood Blood-Peripheral  09-13 @ 03:59   No growth at 5 days  --  --      .Blood Blood-Peripheral  09-13 @ 03:50   No growth at 5 days  --  --      Abdominal Fl Abdominal Fluid  09-11 @ 22:00   Culture is being performed.  --  Escherichia coli  Escherichia coli      Clean Catch Clean Catch (Midstream)  09-11 @ 15:00   No growth  --  --      .Blood Blood-Peripheral  09-11 @ 12:50   Growth in anaerobic bottle: Bacteroides ovatus group "Susceptibilities  not performed"  --    Growth in anaerobic bottle: Gram Negative Rods      .Blood Blood-Peripheral  09-11 @ 12:45   Growth in anaerobic bottle: Bacteroides fragilis "Susceptibilities not  performed"  Direct identification is available within approximately 3-5  hours either by Blood Panel Multiplexed PCR or Direct  MALDI-TOF. Details: [email or web address]  --  Blood Culture PCR            RADIOLOGY:< from: CT Abdomen and Pelvis w/ Oral Cont and w/ IV Cont (09.18.23 @ 16:00) >  ACC: [id number] EXAM:  CT ABDOMEN AND PELVIS OC IC   ORDERED BY: ROSALINA HARKINS     PROCEDURE DATE:  09/18/2023          INTERPRETATION:  CLINICAL INFORMATION: 73 years  Female with post-op perf   appy w/leukocytosis and fevers.    COMPARISON: 9/11/2023    CONTRAST/COMPLICATIONS:  IV Contrast: Omnipaque 350  90 cc administered   10 cc discarded  Oral Contrast: Gastroview  Complications: None reported at time of study completion    PROCEDURE:  CT of the Abdomen and Pelvis was performed.  Sagittal and coronal reformats were performed.    FINDINGS:  LOWER CHEST: Small bilateral pleural effusions. Bibasilar discoid   atelectasis.    LIVER: 2.6 cm peripherally enhancing liver lesion (2:25) unchanged. 3.0 x   3.3 cm heterogeneously enhancing mass abutting the gallbladder.  BILE DUCTS: Normal caliber.  GALLBLADDER: Mild thickening of the gallbladder fundus.  SPLEEN: Within normal limits.  PANCREAS: Within normal limits.  ADRENALS: Within normal limits.  KIDNEYS/URETERS: Within normal limits.    BLADDER: Within normal limits.  REPRODUCTIVE ORGANS: Left-sided calcified myomata. Redemonstrated   heterogeneous 5.2 cm right adnexal mass may represent uterine myomata or   ovarian neoplasm.    BOWEL: No bowel obstruction. Enteric contrast in the small bowel has not   yet reached the cecum. Appendix is surgically absent.  PERITONEUM: No ascites. 7.8 x 3.3 x 8.5 cm collection in the right lower   quadrant operative bed.  VESSELS: Atherosclerotic changes.  RETROPERITONEUM/LYMPH NODES: No lymphadenopathy.  ABDOMINAL WALL: Drainage catheter via a left paraumbilical approach with   tip in the right subphrenic space. Anasarca.  BONES: Degenerative changes. Grade 1 anterolisthesis at L4-5. Small   sclerotic lesion in the medullary space of the proximal right femur may   represent an enchondroma.    IMPRESSION:    Status post appendectomy. 7.8 x 3.3 x 8.5 cm collection in the operative   bed, either abscess, seroma or hematoma.    Drainage catheter tip in the right subphrenic space.    2 indeterminate liver masses. Recommend MRI without and with gadolinium.   Focal thickening of the gallbladder fundus can also be assessed with MRI.    Heterogeneous right periuterine soft tissue mass reidentified, either   myomata or ovarian neoplasm. Recommend pelvic ultrasound and/or MRI. GYN   evaluation is advised.    Findings discussed with  Dr. Moffett at 9/18/2023 4:38 PM with readback.    --- End of Report ---            STEPHANY OREILLY MD; Attending Radiologist  This document has been electronically signed. Sep 18 2023  4:52PM    < end of copied text >        ROS:  [  ] UNABLE TO ELICIT 73y Female who is looking good clinically but had a low grade  temp of 100.4 and her leukocytosis is slightly higher and still has lower abdominal pain and so a repeat CT abdomen and pelvis was done and she was found to have a large RLQ abscess. Her repeat blood cultures remain negative.    Meds:  piperacillin/tazobactam IVPB.. 3.375 Gram(s) IV Intermittent every 8 hours    Allergies    sulfa drugs (Unknown)    Intolerances        VITALS:  Vital Signs Last 24 Hrs  T(C): 36.8 (18 Sep 2023 12:30), Max: 38 (17 Sep 2023 20:42)  T(F): 98.2 (18 Sep 2023 12:30), Max: 100.4 (17 Sep 2023 20:42)  HR: 102 (18 Sep 2023 12:30) (80 - 104)  BP: 102/64 (18 Sep 2023 12:30) (102/64 - 166/83)  BP(mean): 100 (17 Sep 2023 20:42) (100 - 100)  RR: 18 (18 Sep 2023 12:30) (17 - 92)  SpO2: 93% (18 Sep 2023 12:30) (93% - 94%)    Parameters below as of 18 Sep 2023 12:30  Patient On (Oxygen Delivery Method): nasal cannula  O2 Flow (L/min): 2      LABS/DIAGNOSTIC TESTS:                          10.7   13.04 )-----------( 378      ( 18 Sep 2023 07:25 )             32.9         09-18    144  |  109<H>  |  4<L>  ----------------------------<  84  3.8   |  29  |  0.50    Ca    8.4      18 Sep 2023 07:25  Phos  2.6     09-18  Mg     2.2     09-18    TPro  6.1  /  Alb  1.9<L>  /  TBili  0.6  /  DBili  x   /  AST  19  /  ALT  21  /  AlkPhos  96  09-18      LIVER FUNCTIONS - ( 18 Sep 2023 07:25 )  Alb: 1.9 g/dL / Pro: 6.1 g/dL / ALK PHOS: 96 U/L / ALT: 21 U/L DA / AST: 19 U/L / GGT: x             CULTURES: .Blood Blood-Peripheral  09-13 @ 03:59   No growth at 5 days  --  --      .Blood Blood-Peripheral  09-13 @ 03:50   No growth at 5 days  --  --      Abdominal Fl Abdominal Fluid  09-11 @ 22:00   Culture is being performed.  --  Escherichia coli  Escherichia coli      Clean Catch Clean Catch (Midstream)  09-11 @ 15:00   No growth  --  --      .Blood Blood-Peripheral  09-11 @ 12:50   Growth in anaerobic bottle: Bacteroides ovatus group "Susceptibilities  not performed"  --    Growth in anaerobic bottle: Gram Negative Rods      .Blood Blood-Peripheral  09-11 @ 12:45   Growth in anaerobic bottle: Bacteroides fragilis "Susceptibilities not  performed"  Direct identification is available within approximately 3-5  hours either by Blood Panel Multiplexed PCR or Direct  MALDI-TOF. Details: [email or web address]  --  Blood Culture PCR            RADIOLOGY:< from: CT Abdomen and Pelvis w/ Oral Cont and w/ IV Cont (09.18.23 @ 16:00) >  ACC: [id number] EXAM:  CT ABDOMEN AND PELVIS OC IC   ORDERED BY: ROSALINA HARKINS     PROCEDURE DATE:  09/18/2023          INTERPRETATION:  CLINICAL INFORMATION: 73 years  Female with post-op perf   appy w/leukocytosis and fevers.    COMPARISON: 9/11/2023    CONTRAST/COMPLICATIONS:  IV Contrast: Omnipaque 350  90 cc administered   10 cc discarded  Oral Contrast: Gastroview  Complications: None reported at time of study completion    PROCEDURE:  CT of the Abdomen and Pelvis was performed.  Sagittal and coronal reformats were performed.    FINDINGS:  LOWER CHEST: Small bilateral pleural effusions. Bibasilar discoid   atelectasis.    LIVER: 2.6 cm peripherally enhancing liver lesion (2:25) unchanged. 3.0 x   3.3 cm heterogeneously enhancing mass abutting the gallbladder.  BILE DUCTS: Normal caliber.  GALLBLADDER: Mild thickening of the gallbladder fundus.  SPLEEN: Within normal limits.  PANCREAS: Within normal limits.  ADRENALS: Within normal limits.  KIDNEYS/URETERS: Within normal limits.    BLADDER: Within normal limits.  REPRODUCTIVE ORGANS: Left-sided calcified myomata. Redemonstrated   heterogeneous 5.2 cm right adnexal mass may represent uterine myomata or   ovarian neoplasm.    BOWEL: No bowel obstruction. Enteric contrast in the small bowel has not   yet reached the cecum. Appendix is surgically absent.  PERITONEUM: No ascites. 7.8 x 3.3 x 8.5 cm collection in the right lower   quadrant operative bed.  VESSELS: Atherosclerotic changes.  RETROPERITONEUM/LYMPH NODES: No lymphadenopathy.  ABDOMINAL WALL: Drainage catheter via a left paraumbilical approach with   tip in the right subphrenic space. Anasarca.  BONES: Degenerative changes. Grade 1 anterolisthesis at L4-5. Small   sclerotic lesion in the medullary space of the proximal right femur may   represent an enchondroma.    IMPRESSION:    Status post appendectomy. 7.8 x 3.3 x 8.5 cm collection in the operative   bed, either abscess, seroma or hematoma.    Drainage catheter tip in the right subphrenic space.    2 indeterminate liver masses. Recommend MRI without and with gadolinium.   Focal thickening of the gallbladder fundus can also be assessed with MRI.    Heterogeneous right periuterine soft tissue mass reidentified, either   myomata or ovarian neoplasm. Recommend pelvic ultrasound and/or MRI. GYN   evaluation is advised.    Findings discussed with  Dr. Moffett at 9/18/2023 4:38 PM with readback.    --- End of Report ---            STEPHANY OREILLY MD; Attending Radiologist  This document has been electronically signed. Sep 18 2023  4:52PM    < end of copied text >        ROS:  [  ] UNABLE TO ELICIT

## 2023-09-18 NOTE — PROGRESS NOTE ADULT - NS ATTEND AMEND GEN_ALL_CORE FT
I agree with above
Pt seen and examined. Sister at bedside. A+O x 3. Sitting OOB to chair. Febrile with persistent leukocytosis.  Abd- soft, ND, non tender to palpation, no guarding no rebound. MARY LOU drains mostly serous.  At high risk for abscess. For CT a/p today given fevers, leukocytosis.
I agree with above

## 2023-09-18 NOTE — PROGRESS NOTE ADULT - SUBJECTIVE AND OBJECTIVE BOX
S: Patient seen and examined at bedside. No acute overnight events. HD stable, afebrile but temp mildly elevated to 100.4F overnight, minimal tachycardia to low 100s. Patient states she feels well and denies any current complaints. No abdominal pain, nausea, or vomiting. Still having BMs w/last BM yesterday which was soft but not diarrhea. Tolerating solid diet. Remains on zosyn w/WBC lateral today at 14 from 13 the prior 2 days. JPs x2 remain in place w/40cc/115cc output each SS. ID on board w/plan for PO Ceftin and Flagyl to end date 9/26 if dc'ed.     O:  Vital Signs Last 24 Hrs  T(C): 36.4 (18 Sep 2023 05:12), Max: 38 (17 Sep 2023 20:42)  T(F): 97.6 (18 Sep 2023 05:12), Max: 100.4 (17 Sep 2023 20:42)  HR: 80 (18 Sep 2023 05:12) (80 - 104)  BP: 166/83 (18 Sep 2023 05:12) (122/64 - 166/83)  BP(mean): 100 (17 Sep 2023 20:42) (100 - 100)  RR: 17 (18 Sep 2023 05:12) (17 - 92)  SpO2: 94% (18 Sep 2023 05:12) (94% - 95%)    Parameters below as of 18 Sep 2023 05:12  Patient On (Oxygen Delivery Method): room air    Physical Examination:  Gen: Awake, alert, oriented x3 and in no acute distress - no further delirium/disorientation  Pulm: Normal work of breathing on room air, no respiratory distress  Abd: Soft, non-distended, non-tender. No guarding, rigidity, or rebound. MARY LOU x2 in place w/minimal SS output. Incision sites CDI no strikethrough.  Ext: Moving all extremities equally  Pulses: 2+ distal pulses in all extremities  Neuro: GCS 15, no focal deficits, mental status markedly improved compared to initial delirium                           10.7   13.04 )-----------( 378      ( 18 Sep 2023 07:25 )             32.9   09-18    144  |  109<H>  |  4<L>  ----------------------------<  84  3.8   |  29  |  0.50    Ca    8.4      18 Sep 2023 07:25  Phos  2.6     09-18  Mg     2.2     09-18    TPro  6.1  /  Alb  1.9<L>  /  TBili  0.6  /  DBili  x   /  AST  19  /  ALT  21  /  AlkPhos  96  09-18    A: 73F s/p lap appy 2/2 gross perforated appendicitis w/persistent leukocytosis and low grade fevers    P:  - C/w surgical floor  - Pain control  - Vitals Q4h  - C/w zosyn  - C/w regular diet, monitor bowel fxn - currently having normal BMs  - CT AP w/PO and IV contrast today - eval for enteric leak or other infectious source given persistent leukocytosis and low grade fevers  - C/w trend MARY LOU output and quality - currently SS

## 2023-09-18 NOTE — PROGRESS NOTE ADULT - SUBJECTIVE AND OBJECTIVE BOX
INTERVAL HPI/OVERNIGHT EVENTS:  Patient seen,stable ,no acute issues  VITAL SIGNS:  T(F): 98.2 (09-18-23 @ 12:30)  HR: 102 (09-18-23 @ 12:30)  BP: 102/64 (09-18-23 @ 12:30)  RR: 18 (09-18-23 @ 12:30)  SpO2: 93% (09-18-23 @ 12:30)  Wt(kg): --    PHYSICAL EXAM:  awake  Constitutional:  Eyes:  ENMT:perrla  Neck:  Respiratory:clear  Cardiovascular:s1s2,m-none  Gastrointestinal:soft,bs pos  Extremities:  Vascular:  Neurological:no focal deficit  Musculoskeletal:    MEDICATIONS  (STANDING):  chlorhexidine 2% Cloths 1 Application(s) Topical <User Schedule>  famotidine    Tablet 20 milliGRAM(s) Oral daily  heparin   Injectable 5000 Unit(s) SubCutaneous every 8 hours  phenazopyridine 100 milliGRAM(s) Oral every 8 hours  piperacillin/tazobactam IVPB.. 3.375 Gram(s) IV Intermittent every 8 hours  pregabalin 150 milliGRAM(s) Oral three times a day    MEDICATIONS  (PRN):  acetaminophen     Tablet .. 650 milliGRAM(s) Oral every 6 hours PRN Temp greater or equal to 38C (100.4F), Mild Pain (1 - 3)  morphine  - Injectable 2 milliGRAM(s) IV Push every 6 hours PRN Severe Pain (7 - 10)      Allergies    sulfa drugs (Unknown)    Intolerances        LABS:                        10.7   13.04 )-----------( 378      ( 18 Sep 2023 07:25 )             32.9     09-18    144  |  109<H>  |  4<L>  ----------------------------<  84  3.8   |  29  |  0.50    Ca    8.4      18 Sep 2023 07:25  Phos  2.6     09-18  Mg     2.2     09-18    TPro  6.1  /  Alb  1.9<L>  /  TBili  0.6  /  DBili  x   /  AST  19  /  ALT  21  /  AlkPhos  96  09-18      Urinalysis Basic - ( 18 Sep 2023 07:25 )    Color: x / Appearance: x / SG: x / pH: x  Gluc: 84 mg/dL / Ketone: x  / Bili: x / Urobili: x   Blood: x / Protein: x / Nitrite: x   Leuk Esterase: x / RBC: x / WBC x   Sq Epi: x / Non Sq Epi: x / Bacteria: x        RADIOLOGY & ADDITIONAL TESTS:      Assessment and Plan:   · Assessment	  · Assessment    Patient is a 74 yo female with hx of COPD, not on home oxygen, neuropathy presents with abdominal pain. Upon evaluation in ED, patient noted with temp of 101.8, BP of 127/66, saturating 91% on Room air with hr of 124bpm. Physical examination noted with diffuse tenderness in abdominal region with guarding. Labs significant for leukocytosis of 13, lactate of 3.5 and scr of 1.41. CT abdomen and pelvis noted for free air in peritoneum and findings for perforated acute appendicitis with fidings of 6mm gall bladder lesion and right adnexal lesion. CXR negative. Surgery consulted and patient is scheduled for urgent ex lap, abdominal washout and possible bowel resection. ICU consulted for sepsis 2/2 perforated appendicitis. Patient admitted to ICU for sepsis 2/2 acute appendicitis.     #Acute Encephalopathy   #Sepsis  #Acute perforated appendicitis  #COPD  #Neuropathy  # Elevated lactate  # Gall bladder wall lesion   # MAYRA  #RIght Adnexal Lesion    ============Pulmonology===========  # Hx of COPD -stable  - patient with hx of COPD   - Chest X-ray shows same findings as previous, increased central interstitial markings.    =========GI=============  # Sepsis 2/2 Perforated acute appendicitis   - Pt underwent laparoscopic appendectomy and washout  - peritoneum full of feculent pus according to surgeon, Dr. Sunshine  - IVF hydration   - continue with zosyn   - ID consulted Dr. Moffett   - Pain control with morphine    #Gallbladder wall lesion  - patient on CT noted with Focal 6 mm enhancing lesion at the gallbladder fundal wall  - Consider MR abdomen when clinically stable      ==========Renal==========  #MAYRA  - patient noted to have Scr of 1.41 on admission  - avoid nephrotoxic agents  - Cr trended down    =========Infectious Disease===========  # Sepsis 2/2 Perforated acute appendicitis   - noted with findings of acute perforated appendicitis on CT scan  - hemodynamically stable, not requiring pressors   - Clear liquid diet  - IVF hydration   - s/p diagnostic laparoscopy with appendectomy and washout  - trend lactate  - ID consulted Dr. Moffett   - continue with zosyn   - will f/u for sensitivities    =========Reproductive===========  #Right Adnexal lesion  - CT abdomen showing 5.2 cm soft tissue density structure in the right adnexa may represent an exophytic uterine fibroid, or a right ovarian mass  - f/u pelvic US when stable     ========Prophylaxis=======  - heparin subq    Code Status: Full Code

## 2023-09-18 NOTE — PROGRESS NOTE ADULT - MUSCULOSKELETAL
no joint swelling/no joint erythema/no joint warmth
no joint swelling/no joint erythema/no joint warmth
[phone]

## 2023-09-19 LAB
-  AMOXICILLIN/CLAVULANIC ACID: SIGNIFICANT CHANGE UP
-  CLINDAMYCIN: SIGNIFICANT CHANGE UP
-  IMIPENEM: SIGNIFICANT CHANGE UP
-  METRONIDAZOLE: SIGNIFICANT CHANGE UP
ALBUMIN SERPL ELPH-MCNC: 1.9 G/DL — LOW (ref 3.5–5)
ALP SERPL-CCNC: 103 U/L — SIGNIFICANT CHANGE UP (ref 40–120)
ALT FLD-CCNC: 22 U/L DA — SIGNIFICANT CHANGE UP (ref 10–60)
ANION GAP SERPL CALC-SCNC: 9 MMOL/L — SIGNIFICANT CHANGE UP (ref 5–17)
AST SERPL-CCNC: 17 U/L — SIGNIFICANT CHANGE UP (ref 10–40)
BASOPHILS # BLD AUTO: 0.06 K/UL — SIGNIFICANT CHANGE UP (ref 0–0.2)
BASOPHILS NFR BLD AUTO: 0.5 % — SIGNIFICANT CHANGE UP (ref 0–2)
BILIRUB SERPL-MCNC: 0.4 MG/DL — SIGNIFICANT CHANGE UP (ref 0.2–1.2)
BUN SERPL-MCNC: 4 MG/DL — LOW (ref 7–18)
CALCIUM SERPL-MCNC: 8.3 MG/DL — LOW (ref 8.4–10.5)
CHLORIDE SERPL-SCNC: 104 MMOL/L — SIGNIFICANT CHANGE UP (ref 96–108)
CO2 SERPL-SCNC: 28 MMOL/L — SIGNIFICANT CHANGE UP (ref 22–31)
CREAT SERPL-MCNC: 0.58 MG/DL — SIGNIFICANT CHANGE UP (ref 0.5–1.3)
CULTURE RESULTS: SIGNIFICANT CHANGE UP
EGFR: 95 ML/MIN/1.73M2 — SIGNIFICANT CHANGE UP
EOSINOPHIL # BLD AUTO: 0.25 K/UL — SIGNIFICANT CHANGE UP (ref 0–0.5)
EOSINOPHIL NFR BLD AUTO: 2 % — SIGNIFICANT CHANGE UP (ref 0–6)
GLUCOSE SERPL-MCNC: 97 MG/DL — SIGNIFICANT CHANGE UP (ref 70–99)
HCT VFR BLD CALC: 29.9 % — LOW (ref 34.5–45)
HGB BLD-MCNC: 9.8 G/DL — LOW (ref 11.5–15.5)
IMM GRANULOCYTES NFR BLD AUTO: 6.1 % — HIGH (ref 0–0.9)
LYMPHOCYTES # BLD AUTO: 19 % — SIGNIFICANT CHANGE UP (ref 13–44)
LYMPHOCYTES # BLD AUTO: 2.4 K/UL — SIGNIFICANT CHANGE UP (ref 1–3.3)
MAGNESIUM SERPL-MCNC: 2.2 MG/DL — SIGNIFICANT CHANGE UP (ref 1.6–2.6)
MCHC RBC-ENTMCNC: 28.7 PG — SIGNIFICANT CHANGE UP (ref 27–34)
MCHC RBC-ENTMCNC: 32.8 GM/DL — SIGNIFICANT CHANGE UP (ref 32–36)
MCV RBC AUTO: 87.7 FL — SIGNIFICANT CHANGE UP (ref 80–100)
METHOD TYPE: SIGNIFICANT CHANGE UP
MONOCYTES # BLD AUTO: 1.15 K/UL — HIGH (ref 0–0.9)
MONOCYTES NFR BLD AUTO: 9.1 % — SIGNIFICANT CHANGE UP (ref 2–14)
NEUTROPHILS # BLD AUTO: 8.01 K/UL — HIGH (ref 1.8–7.4)
NEUTROPHILS NFR BLD AUTO: 63.3 % — SIGNIFICANT CHANGE UP (ref 43–77)
NRBC # BLD: 0 /100 WBCS — SIGNIFICANT CHANGE UP (ref 0–0)
ORGANISM # SPEC MICROSCOPIC CNT: SIGNIFICANT CHANGE UP
PHOSPHATE SERPL-MCNC: 3 MG/DL — SIGNIFICANT CHANGE UP (ref 2.5–4.5)
PLATELET # BLD AUTO: 387 K/UL — SIGNIFICANT CHANGE UP (ref 150–400)
POTASSIUM SERPL-MCNC: 3.7 MMOL/L — SIGNIFICANT CHANGE UP (ref 3.5–5.3)
POTASSIUM SERPL-SCNC: 3.7 MMOL/L — SIGNIFICANT CHANGE UP (ref 3.5–5.3)
PROT SERPL-MCNC: 5.9 G/DL — LOW (ref 6–8.3)
RBC # BLD: 3.41 M/UL — LOW (ref 3.8–5.2)
RBC # FLD: 14.6 % — HIGH (ref 10.3–14.5)
SODIUM SERPL-SCNC: 141 MMOL/L — SIGNIFICANT CHANGE UP (ref 135–145)
SURGICAL PATHOLOGY STUDY: SIGNIFICANT CHANGE UP
WBC # BLD: 12.64 K/UL — HIGH (ref 3.8–10.5)
WBC # FLD AUTO: 12.64 K/UL — HIGH (ref 3.8–10.5)

## 2023-09-19 RX ORDER — SODIUM CHLORIDE 9 MG/ML
1000 INJECTION INTRAMUSCULAR; INTRAVENOUS; SUBCUTANEOUS
Refills: 0 | Status: DISCONTINUED | OUTPATIENT
Start: 2023-09-19 | End: 2023-09-20

## 2023-09-19 RX ORDER — PIPERACILLIN AND TAZOBACTAM 4; .5 G/20ML; G/20ML
3.38 INJECTION, POWDER, LYOPHILIZED, FOR SOLUTION INTRAVENOUS EVERY 8 HOURS
Refills: 0 | Status: DISCONTINUED | OUTPATIENT
Start: 2023-09-19 | End: 2023-09-22

## 2023-09-19 RX ADMIN — HEPARIN SODIUM 5000 UNIT(S): 5000 INJECTION INTRAVENOUS; SUBCUTANEOUS at 12:16

## 2023-09-19 RX ADMIN — PIPERACILLIN AND TAZOBACTAM 25 GRAM(S): 4; .5 INJECTION, POWDER, LYOPHILIZED, FOR SOLUTION INTRAVENOUS at 00:57

## 2023-09-19 RX ADMIN — Medication 100 MILLIGRAM(S): at 05:11

## 2023-09-19 RX ADMIN — Medication 150 MILLIGRAM(S): at 21:13

## 2023-09-19 RX ADMIN — FAMOTIDINE 20 MILLIGRAM(S): 10 INJECTION INTRAVENOUS at 12:16

## 2023-09-19 RX ADMIN — Medication 100 MILLIGRAM(S): at 12:16

## 2023-09-19 RX ADMIN — HEPARIN SODIUM 5000 UNIT(S): 5000 INJECTION INTRAVENOUS; SUBCUTANEOUS at 05:13

## 2023-09-19 RX ADMIN — PIPERACILLIN AND TAZOBACTAM 25 GRAM(S): 4; .5 INJECTION, POWDER, LYOPHILIZED, FOR SOLUTION INTRAVENOUS at 12:16

## 2023-09-19 RX ADMIN — CHLORHEXIDINE GLUCONATE 1 APPLICATION(S): 213 SOLUTION TOPICAL at 05:11

## 2023-09-19 RX ADMIN — Medication 150 MILLIGRAM(S): at 05:11

## 2023-09-19 RX ADMIN — PIPERACILLIN AND TAZOBACTAM 25 GRAM(S): 4; .5 INJECTION, POWDER, LYOPHILIZED, FOR SOLUTION INTRAVENOUS at 19:56

## 2023-09-19 RX ADMIN — Medication 100 MILLIGRAM(S): at 21:14

## 2023-09-19 RX ADMIN — Medication 150 MILLIGRAM(S): at 12:16

## 2023-09-19 NOTE — PROGRESS NOTE ADULT - SUBJECTIVE AND OBJECTIVE BOX
S: Patient seen and examined at bedside. No acute overnight events. HD stable, afebrile. Patient states she feels well and denies any complaints. Continues to tolerate solid diet and having BMs. Plan for IR consult today to eval for perc drainage of intraabdominal abscess.    O:  Vital Signs Last 24 Hrs  T(C): 37.2 (19 Sep 2023 12:44), Max: 37.7 (18 Sep 2023 20:40)  T(F): 98.9 (19 Sep 2023 12:44), Max: 99.8 (18 Sep 2023 20:40)  HR: 87 (19 Sep 2023 13:02) (79 - 91)  BP: 154/65 (19 Sep 2023 12:44) (138/64 - 164/68)  BP(mean): 88 (19 Sep 2023 06:00) (88 - 88)  RR: 18 (19 Sep 2023 12:44) (18 - 18)  SpO2: 93% (19 Sep 2023 13:02) (93% - 94%)    Parameters below as of 19 Sep 2023 13:02  Patient On (Oxygen Delivery Method): nasal cannula  O2 Flow (L/min): 2    Physical Examination:  Gen: Awake, alert, oriented x3 and in no acute distress - no further delirium/disorientation  Pulm: Normal work of breathing on room air, no respiratory distress  Abd: Soft, non-distended, non-tender. No guarding, rigidity, or rebound. MARY LOU x2 in place w/minimal SS output. Incision sites CDI no strikethrough.  Ext: Moving all extremities equally  Pulses: 2+ distal pulses in all extremities  Neuro: GCS 15, no focal deficits, mental status markedly improved compared to initial delirium                           9.8    12.64 )-----------( 387      ( 19 Sep 2023 06:23 )             29.9   09-19    141  |  104  |  4<L>  ----------------------------<  97  3.7   |  28  |  0.58    Ca    8.3<L>      19 Sep 2023 06:23  Phos  3.0     09-19  Mg     2.2     09-19    TPro  5.9<L>  /  Alb  1.9<L>  /  TBili  0.4  /  DBili  x   /  AST  17  /  ALT  22  /  AlkPhos  103  09-19    A: 73F s/p lap appy 2/2 gross perforated appendicitis c/b intraabdominal abscess    P:  - C/w surgical floor  - Pain control  - Vitals Q4h  - C/w zosyn  - C/w regular diet, monitor bowel fxn - currently having normal BMs  - CT noted w/RLQ abscess - IR consulted for eval for perc drainage, will f/u  - C/w trend MARY LOU output and quality - currently SS

## 2023-09-19 NOTE — PROGRESS NOTE ADULT - SUBJECTIVE AND OBJECTIVE BOX
Patient for probable drainage of a pelvic collection in IR tomorrow.  Please keep patient NPO, send early AM labs including CBC, BMP, and PT, INR / PTT.  Hold all anticoagulation, NSAIDS, and antiplatelet medication.

## 2023-09-19 NOTE — PROGRESS NOTE ADULT - SUBJECTIVE AND OBJECTIVE BOX
INTERVAL HPI/OVERNIGHT EVENTS:  Patient seen,doing better,no acute issues,afebrile  VITAL SIGNS:  T(F): 98.9 (09-19-23 @ 12:44)  HR: 87 (09-19-23 @ 13:02)  BP: 154/65 (09-19-23 @ 12:44)  RR: 18 (09-19-23 @ 12:44)  SpO2: 93% (09-19-23 @ 13:02)  Wt(kg): --    PHYSICAL EXAM:  awake,alert  Constitutional:  Eyes:  ENMT:perrla  Neck:  Respiratory:clear  Cardiovascular:s1s2,m-none  Gastrointestinal:soft,bs pos,mild tendernesss with drainage  Extremities:  Vascular:  Neurological:no docal deficit  Musculoskeletal:    MEDICATIONS  (STANDING):  chlorhexidine 2% Cloths 1 Application(s) Topical <User Schedule>  famotidine    Tablet 20 milliGRAM(s) Oral daily  heparin   Injectable 5000 Unit(s) SubCutaneous every 8 hours  phenazopyridine 100 milliGRAM(s) Oral every 8 hours  pregabalin 150 milliGRAM(s) Oral three times a day    MEDICATIONS  (PRN):  acetaminophen     Tablet .. 650 milliGRAM(s) Oral every 6 hours PRN Temp greater or equal to 38C (100.4F), Mild Pain (1 - 3)  morphine  - Injectable 2 milliGRAM(s) IV Push every 6 hours PRN Severe Pain (7 - 10)      Allergies    sulfa drugs (Unknown)    Intolerances        LABS:                        9.8    12.64 )-----------( 387      ( 19 Sep 2023 06:23 )             29.9     09-19    141  |  104  |  4<L>  ----------------------------<  97  3.7   |  28  |  0.58    Ca    8.3<L>      19 Sep 2023 06:23  Phos  3.0     09-19  Mg     2.2     09-19    TPro  5.9<L>  /  Alb  1.9<L>  /  TBili  0.4  /  DBili  x   /  AST  17  /  ALT  22  /  AlkPhos  103  09-19      Urinalysis Basic - ( 19 Sep 2023 06:23 )    Color: x / Appearance: x / SG: x / pH: x  Gluc: 97 mg/dL / Ketone: x  / Bili: x / Urobili: x   Blood: x / Protein: x / Nitrite: x   Leuk Esterase: x / RBC: x / WBC x   Sq Epi: x / Non Sq Epi: x / Bacteria: x        RADIOLOGY & ADDITIONAL TESTS:      Assessment and Plan:   · Assessment	  · Assessment    Patient is a 72 yo female with hx of COPD, not on home oxygen, neuropathy presents with abdominal pain. Upon evaluation in ED, patient noted with temp of 101.8, BP of 127/66, saturating 91% on Room air with hr of 124bpm. Physical examination noted with diffuse tenderness in abdominal region with guarding. Labs significant for leukocytosis of 13, lactate of 3.5 and scr of 1.41. CT abdomen and pelvis noted for free air in peritoneum and findings for perforated acute appendicitis with fidings of 6mm gall bladder lesion and right adnexal lesion. CXR negative. Surgery consulted and patient is scheduled for urgent ex lap, abdominal washout and possible bowel resection. ICU consulted for sepsis 2/2 perforated appendicitis. Patient admitted to ICU for sepsis 2/2 acute appendicitis.     #Acute Encephalopathy   #Sepsis  #Acute perforated appendicitis  #COPD  #Neuropathy  # Elevated lactate  # Gall bladder wall lesion   # MAYRA  #RIght Adnexal Lesion    ============Pulmonology===========  # Hx of COPD -stable  - patient with hx of COPD   - Chest X-ray shows same findings as previous, increased central interstitial markings.    =========GI=============  # Sepsis 2/2 Perforated acute appendicitis   - Pt underwent laparoscopic appendectomy and washout  - peritoneum full of feculent pus according to surgeon, Dr. Sunshine  - IVF hydration   - continue with zosyn   - ID consulted Dr. Moffett   - Pain control with morphine    #Gallbladder wall lesion  - patient on CT noted with Focal 6 mm enhancing lesion at the gallbladder fundal wall  - Consider MR abdomen when clinically stable      ==========Renal==========  #MAYRA  - patient noted to have Scr of 1.41 on admission  - avoid nephrotoxic agents  - Cr trended down    =========Infectious Disease===========  # Sepsis 2/2 Perforated acute appendicitis   - noted with findings of acute perforated appendicitis on CT scan  - hemodynamically stable, not requiring pressors   - Clear liquid diet  - IVF hydration   - s/p diagnostic laparoscopy with appendectomy and washout  - trend lactate  - ID consulted Dr. Moffett   - continue with zosyn   - will f/u for sensitivities  s/p ct scan-results noticed  pending for possiblel drainage of collection by IR    =========Reproductive===========  #Right Adnexal lesion  - CT abdomen showing 5.2 cm soft tissue density structure in the right adnexa may represent an exophytic uterine fibroid, or a right ovarian mass  - f/u pelvic US when stable     ========Prophylaxis=======  - heparin subq    Code Status: Full Code

## 2023-09-20 LAB
ANION GAP SERPL CALC-SCNC: 5 MMOL/L — SIGNIFICANT CHANGE UP (ref 5–17)
APTT BLD: 30.4 SEC — SIGNIFICANT CHANGE UP (ref 24.5–35.6)
BUN SERPL-MCNC: 3 MG/DL — LOW (ref 7–18)
CALCIUM SERPL-MCNC: 8.1 MG/DL — LOW (ref 8.4–10.5)
CHLORIDE SERPL-SCNC: 106 MMOL/L — SIGNIFICANT CHANGE UP (ref 96–108)
CO2 SERPL-SCNC: 31 MMOL/L — SIGNIFICANT CHANGE UP (ref 22–31)
CREAT SERPL-MCNC: 0.52 MG/DL — SIGNIFICANT CHANGE UP (ref 0.5–1.3)
EGFR: 97 ML/MIN/1.73M2 — SIGNIFICANT CHANGE UP
GLUCOSE SERPL-MCNC: 90 MG/DL — SIGNIFICANT CHANGE UP (ref 70–99)
GRAM STN FLD: SIGNIFICANT CHANGE UP
HCT VFR BLD CALC: 30.6 % — LOW (ref 34.5–45)
HGB BLD-MCNC: 9.9 G/DL — LOW (ref 11.5–15.5)
INR BLD: 0.95 RATIO — SIGNIFICANT CHANGE UP (ref 0.85–1.18)
MAGNESIUM SERPL-MCNC: 2.2 MG/DL — SIGNIFICANT CHANGE UP (ref 1.6–2.6)
MCHC RBC-ENTMCNC: 28.9 PG — SIGNIFICANT CHANGE UP (ref 27–34)
MCHC RBC-ENTMCNC: 32.4 GM/DL — SIGNIFICANT CHANGE UP (ref 32–36)
MCV RBC AUTO: 89.2 FL — SIGNIFICANT CHANGE UP (ref 80–100)
NRBC # BLD: 0 /100 WBCS — SIGNIFICANT CHANGE UP (ref 0–0)
PHOSPHATE SERPL-MCNC: 2.8 MG/DL — SIGNIFICANT CHANGE UP (ref 2.5–4.5)
PLATELET # BLD AUTO: 410 K/UL — HIGH (ref 150–400)
POTASSIUM SERPL-MCNC: 3.9 MMOL/L — SIGNIFICANT CHANGE UP (ref 3.5–5.3)
POTASSIUM SERPL-SCNC: 3.9 MMOL/L — SIGNIFICANT CHANGE UP (ref 3.5–5.3)
PROTHROM AB SERPL-ACNC: 10.9 SEC — SIGNIFICANT CHANGE UP (ref 9.5–13)
RBC # BLD: 3.43 M/UL — LOW (ref 3.8–5.2)
RBC # FLD: 14.7 % — HIGH (ref 10.3–14.5)
SODIUM SERPL-SCNC: 142 MMOL/L — SIGNIFICANT CHANGE UP (ref 135–145)
SPECIMEN SOURCE: SIGNIFICANT CHANGE UP
WBC # BLD: 12.87 K/UL — HIGH (ref 3.8–10.5)
WBC # FLD AUTO: 12.87 K/UL — HIGH (ref 3.8–10.5)

## 2023-09-20 PROCEDURE — 10160 PNXR ASPIR ABSC HMTMA BULLA: CPT

## 2023-09-20 PROCEDURE — 77012 CT SCAN FOR NEEDLE BIOPSY: CPT | Mod: 26

## 2023-09-20 RX ORDER — HEPARIN SODIUM 5000 [USP'U]/ML
5000 INJECTION INTRAVENOUS; SUBCUTANEOUS EVERY 8 HOURS
Refills: 0 | Status: DISCONTINUED | OUTPATIENT
Start: 2023-09-20 | End: 2023-09-22

## 2023-09-20 RX ORDER — ALBUTEROL 90 UG/1
2.5 AEROSOL, METERED ORAL ONCE
Refills: 0 | Status: COMPLETED | OUTPATIENT
Start: 2023-09-20 | End: 2023-09-20

## 2023-09-20 RX ORDER — SODIUM CHLORIDE 9 MG/ML
1000 INJECTION, SOLUTION INTRAVENOUS
Refills: 0 | Status: DISCONTINUED | OUTPATIENT
Start: 2023-09-20 | End: 2023-09-22

## 2023-09-20 RX ORDER — SODIUM,POTASSIUM PHOSPHATES 278-250MG
2 POWDER IN PACKET (EA) ORAL ONCE
Refills: 0 | Status: DISCONTINUED | OUTPATIENT
Start: 2023-09-20 | End: 2023-09-22

## 2023-09-20 RX ADMIN — CHLORHEXIDINE GLUCONATE 1 APPLICATION(S): 213 SOLUTION TOPICAL at 05:42

## 2023-09-20 RX ADMIN — SODIUM CHLORIDE 75 MILLILITER(S): 9 INJECTION, SOLUTION INTRAVENOUS at 20:09

## 2023-09-20 RX ADMIN — Medication 150 MILLIGRAM(S): at 21:18

## 2023-09-20 RX ADMIN — Medication 100 MILLIGRAM(S): at 21:18

## 2023-09-20 RX ADMIN — PIPERACILLIN AND TAZOBACTAM 25 GRAM(S): 4; .5 INJECTION, POWDER, LYOPHILIZED, FOR SOLUTION INTRAVENOUS at 20:09

## 2023-09-20 RX ADMIN — HEPARIN SODIUM 5000 UNIT(S): 5000 INJECTION INTRAVENOUS; SUBCUTANEOUS at 21:18

## 2023-09-20 RX ADMIN — Medication 100 MILLIGRAM(S): at 16:48

## 2023-09-20 RX ADMIN — ALBUTEROL 2.5 MILLIGRAM(S): 90 AEROSOL, METERED ORAL at 13:00

## 2023-09-20 RX ADMIN — Medication 150 MILLIGRAM(S): at 16:43

## 2023-09-20 RX ADMIN — Medication 30 MILLILITER(S): at 21:18

## 2023-09-20 RX ADMIN — Medication 650 MILLIGRAM(S): at 16:49

## 2023-09-20 RX ADMIN — PIPERACILLIN AND TAZOBACTAM 25 GRAM(S): 4; .5 INJECTION, POWDER, LYOPHILIZED, FOR SOLUTION INTRAVENOUS at 11:49

## 2023-09-20 RX ADMIN — PIPERACILLIN AND TAZOBACTAM 25 GRAM(S): 4; .5 INJECTION, POWDER, LYOPHILIZED, FOR SOLUTION INTRAVENOUS at 03:53

## 2023-09-20 RX ADMIN — Medication 650 MILLIGRAM(S): at 16:43

## 2023-09-20 RX ADMIN — Medication 150 MILLIGRAM(S): at 05:41

## 2023-09-20 RX ADMIN — FAMOTIDINE 20 MILLIGRAM(S): 10 INJECTION INTRAVENOUS at 16:43

## 2023-09-20 RX ADMIN — Medication 100 MILLIGRAM(S): at 05:41

## 2023-09-20 NOTE — PROGRESS NOTE ADULT - SUBJECTIVE AND OBJECTIVE BOX
S: Patient seen and examined at bedside. No acute overnight events. HD stable, afebrile. Feeling well, having BM and passing gas. Tolerating diet. Plan for IR today for perc drainage of intraabdominal abscess.    O:  Vital Signs Last 24 Hrs  T(C): 36.8 (20 Sep 2023 06:00), Max: 37.2 (19 Sep 2023 12:44)  T(F): 98.2 (20 Sep 2023 06:00), Max: 98.9 (19 Sep 2023 12:44)  HR: 78 (20 Sep 2023 06:00) (76 - 91)  BP: 143/63 (20 Sep 2023 06:00) (140/76 - 154/65)  BP(mean): 90 (20 Sep 2023 06:00) (90 - 90)  RR: 16 (20 Sep 2023 06:00) (16 - 18)  SpO2: 95% (20 Sep 2023 06:00) (93% - 97%)    Parameters below as of 20 Sep 2023 06:00  Patient On (Oxygen Delivery Method): nasal cannula  O2 Flow (L/min): 2    I&O's Detail    19 Sep 2023 07:01  -  20 Sep 2023 07:00  --------------------------------------------------------  IN:    Oral Fluid: 300 mL  Total IN: 300 mL    OUT:    Bulb (mL): 65 mL    Bulb (mL): 75 mL    Voided (mL): 700 mL  Total OUT: 840 mL    Total NET: -540 mL    Physical Examination:  Gen: Awake, alert, oriented x3 and in no acute distress  Pulm: Normal work of breathing on room air, no respiratory distress  Abd: Soft, non-distended, mild RLQ tenderness. No guarding, rigidity, or rebound. MARY LOU x2 in place w SS output. Incision sites CDI  Ext: Moving all extremities equally  Neuro: GCS 15, no focal deficits, mental status markedly improved compared to initial delirium

## 2023-09-20 NOTE — PROCEDURE NOTE - PROCEDURE FINDINGS AND DETAILS
- RLQ abdominal abscess again identified and drained. 10.2Fr pigtail drainage catheter placed and 25cc of cloudy yellow fluid with debris was aspirated. Sample of fluid sent for GS and culture. Catheter attached to gravity drainage. Official report to follow.

## 2023-09-20 NOTE — PROGRESS NOTE ADULT - SUBJECTIVE AND OBJECTIVE BOX
INTERVAL HPI/OVERNIGHT EVENTS:  Patient seen,doing better,no acute issues  VITAL SIGNS:  T(F): 98.2 (09-20-23 @ 08:55)  HR: 78 (09-20-23 @ 08:55)  BP: 146/63 (09-20-23 @ 08:55)  RR: 16 (09-20-23 @ 08:55)  SpO2: 95% (09-20-23 @ 06:00)  Wt(kg): --    PHYSICAL EXAM:  awake  Constitutional:  Eyes:  ENMT:perrla  Neck:  Respiratory:clear  Cardiovascular:s1s2,m-none  Gastrointestinal:soft,bs pos  Extremities:  Vascular:  Neurological:  Musculoskeletal:    MEDICATIONS  (STANDING):  albuterol    0.083%. 2.5 milliGRAM(s) Nebulizer once  chlorhexidine 2% Cloths 1 Application(s) Topical <User Schedule>  famotidine    Tablet 20 milliGRAM(s) Oral daily  phenazopyridine 100 milliGRAM(s) Oral every 8 hours  piperacillin/tazobactam IVPB.. 3.375 Gram(s) IV Intermittent every 8 hours  potassium phosphate / sodium phosphate Powder (PHOS-NaK) 2 Packet(s) Oral once  pregabalin 150 milliGRAM(s) Oral three times a day  sodium chloride 0.9%. 1000 milliLiter(s) (100 mL/Hr) IV Continuous <Continuous>    MEDICATIONS  (PRN):  acetaminophen     Tablet .. 650 milliGRAM(s) Oral every 6 hours PRN Temp greater or equal to 38C (100.4F), Mild Pain (1 - 3)  morphine  - Injectable 2 milliGRAM(s) IV Push every 6 hours PRN Severe Pain (7 - 10)      Allergies    sulfa drugs (Unknown)    Intolerances        LABS:                        9.9    12.87 )-----------( 410      ( 20 Sep 2023 08:22 )             30.6     09-20    142  |  106  |  3<L>  ----------------------------<  90  3.9   |  31  |  0.52    Ca    8.1<L>      20 Sep 2023 08:22  Phos  2.8     09-20  Mg     2.2     09-20    TPro  5.9<L>  /  Alb  1.9<L>  /  TBili  0.4  /  DBili  x   /  AST  17  /  ALT  22  /  AlkPhos  103  09-19    PT/INR - ( 20 Sep 2023 08:22 )   PT: 10.9 sec;   INR: 0.95 ratio         PTT - ( 20 Sep 2023 08:22 )  PTT:30.4 sec  Urinalysis Basic - ( 20 Sep 2023 08:22 )    Color: x / Appearance: x / SG: x / pH: x  Gluc: 90 mg/dL / Ketone: x  / Bili: x / Urobili: x   Blood: x / Protein: x / Nitrite: x   Leuk Esterase: x / RBC: x / WBC x   Sq Epi: x / Non Sq Epi: x / Bacteria: x        RADIOLOGY & ADDITIONAL TESTS:        Assessment and Plan:   · Assessment	  · Assessment    Patient is a 74 yo female with hx of COPD, not on home oxygen, neuropathy presents with abdominal pain. Upon evaluation in ED, patient noted with temp of 101.8, BP of 127/66, saturating 91% on Room air with hr of 124bpm. Physical examination noted with diffuse tenderness in abdominal region with guarding. Labs significant for leukocytosis of 13, lactate of 3.5 and scr of 1.41. CT abdomen and pelvis noted for free air in peritoneum and findings for perforated acute appendicitis with fidings of 6mm gall bladder lesion and right adnexal lesion. CXR negative. Surgery consulted and patient is scheduled for urgent ex lap, abdominal washout and possible bowel resection. ICU consulted for sepsis 2/2 perforated appendicitis. Patient admitted to ICU for sepsis 2/2 acute appendicitis.     #Acute Encephalopathy   #Sepsis  #Acute perforated appendicitis  #COPD  #Neuropathy  # Elevated lactate  # Gall bladder wall lesion   # MAYRA  #RIght Adnexal Lesion    ============Pulmonology===========  # Hx of COPD -stable  - patient with hx of COPD   - Chest X-ray shows same findings as previous, increased central interstitial markings.    =========GI=============  # Sepsis 2/2 Perforated acute appendicitis   - Pt underwent laparoscopic appendectomy and washout  - peritoneum full of feculent pus according to surgeon, Dr. Sunshine  - IVF hydration   - continue with zosyn   - ID consulted Dr. Moffett   - Pain control with morphine  drainage of abscess today by IR    #Gallbladder wall lesion  - patient on CT noted with Focal 6 mm enhancing lesion at the gallbladder fundal wall  - Consider MR abdomen when clinically stable      ==========Renal==========  #MAYRA  - patient noted to have Scr of 1.41 on admission  - avoid nephrotoxic agents  - Cr trended down    =========Infectious Disease===========  # Sepsis 2/2 Perforated acute appendicitis   - noted with findings of acute perforated appendicitis on CT scan  - hemodynamically stable, not requiring pressors   - Clear liquid diet  - IVF hydration   - s/p diagnostic laparoscopy with appendectomy and washout  - trend lactate  - ID consulted Dr. Moffett   - continue with zosyn   - will f/u for sensitivities  s/p ct scan-results noticed  pending for possiblel drainage of collection by IR    =========Reproductive===========  #Right Adnexal lesion  - CT abdomen showing 5.2 cm soft tissue density structure in the right adnexa may represent an exophytic uterine fibroid, or a right ovarian mass  - f/u pelvic US when stable     ========Prophylaxis=======  - heparin subq    Code Status: Full Code

## 2023-09-20 NOTE — PROCEDURE NOTE - PLAN
- PACU x 2 hours.  - Monitor drainage catheter output.   - Flush catheter with 5cc NS daily.  - F/u culture results.  - HOLD prophylactic anticoagulation for 12-24 hours. May resume thereafter, IF there are no signs of bleeding.   - Global care per primary team.

## 2023-09-20 NOTE — PRE PROCEDURE NOTE - PRE PROCEDURE EVALUATION
Interventional Radiology Pre-Procedure Note    Diagnosis/Indication: 73y old Female who presented with sepsis due to Perforated Appendicitis s/p lap appy, c/b intraabdominal abscess. Drainage of the abscess was requested.     PAST MEDICAL & SURGICAL HISTORY:  HTN (hypertension)  HLD (hyperlipidemia)  H/O neuropathy  History of COPD    Allergies: sulfa drugs (Unknown)    LABS:  CBC Full  -  ( 20 Sep 2023 08:22 )  WBC Count : 12.87 K/uL  RBC Count : 3.43 M/uL  Hemoglobin : 9.9 g/dL  Hematocrit : 30.6 %  Platelet Count - Automated : 410 K/uL  Mean Cell Volume : 89.2 fl  Mean Cell Hemoglobin : 28.9 pg  Mean Cell Hemoglobin Concentration : 32.4 gm/dL    09-20    142  |  106  |  3<L>  ----------------------------<  90  3.9   |  31  |  0.52    Ca    8.1<L>      20 Sep 2023 08:22  Phos  2.8     09-20  Mg     2.2     09-20    TPro  5.9<L>  /  Alb  1.9<L>  /  TBili  0.4  /  DBili  x   /  AST  17  /  ALT  22  /  AlkPhos  103  09-19    PT/INR - ( 20 Sep 2023 08:22 )   PT: 10.9 sec;   INR: 0.95 ratio      PTT - ( 20 Sep 2023 08:22 )  PTT:30.4 sec    Procedure/ risks/ benefits/ alternatives were discussed with the patient, including but not limited to risks of bleeding, infection and possible injury to nearby structures. The patient verbalizes understanding, and witnessed informed consent was obtained.

## 2023-09-21 LAB
ANION GAP SERPL CALC-SCNC: 8 MMOL/L — SIGNIFICANT CHANGE UP (ref 5–17)
BUN SERPL-MCNC: 4 MG/DL — LOW (ref 7–18)
CALCIUM SERPL-MCNC: 8.2 MG/DL — LOW (ref 8.4–10.5)
CHLORIDE SERPL-SCNC: 107 MMOL/L — SIGNIFICANT CHANGE UP (ref 96–108)
CO2 SERPL-SCNC: 28 MMOL/L — SIGNIFICANT CHANGE UP (ref 22–31)
CREAT SERPL-MCNC: 0.55 MG/DL — SIGNIFICANT CHANGE UP (ref 0.5–1.3)
EGFR: 96 ML/MIN/1.73M2 — SIGNIFICANT CHANGE UP
GLUCOSE SERPL-MCNC: 93 MG/DL — SIGNIFICANT CHANGE UP (ref 70–99)
HCT VFR BLD CALC: 29.8 % — LOW (ref 34.5–45)
HGB BLD-MCNC: 9.6 G/DL — LOW (ref 11.5–15.5)
MAGNESIUM SERPL-MCNC: 2.1 MG/DL — SIGNIFICANT CHANGE UP (ref 1.6–2.6)
MCHC RBC-ENTMCNC: 28.5 PG — SIGNIFICANT CHANGE UP (ref 27–34)
MCHC RBC-ENTMCNC: 32.2 GM/DL — SIGNIFICANT CHANGE UP (ref 32–36)
MCV RBC AUTO: 88.4 FL — SIGNIFICANT CHANGE UP (ref 80–100)
NRBC # BLD: 0 /100 WBCS — SIGNIFICANT CHANGE UP (ref 0–0)
PHOSPHATE SERPL-MCNC: 2.8 MG/DL — SIGNIFICANT CHANGE UP (ref 2.5–4.5)
PLATELET # BLD AUTO: 416 K/UL — HIGH (ref 150–400)
POTASSIUM SERPL-MCNC: 3.5 MMOL/L — SIGNIFICANT CHANGE UP (ref 3.5–5.3)
POTASSIUM SERPL-SCNC: 3.5 MMOL/L — SIGNIFICANT CHANGE UP (ref 3.5–5.3)
RBC # BLD: 3.37 M/UL — LOW (ref 3.8–5.2)
RBC # FLD: 14.6 % — HIGH (ref 10.3–14.5)
SODIUM SERPL-SCNC: 143 MMOL/L — SIGNIFICANT CHANGE UP (ref 135–145)
WBC # BLD: 12.6 K/UL — HIGH (ref 3.8–10.5)
WBC # FLD AUTO: 12.6 K/UL — HIGH (ref 3.8–10.5)

## 2023-09-21 RX ORDER — SODIUM,POTASSIUM PHOSPHATES 278-250MG
2 POWDER IN PACKET (EA) ORAL
Refills: 0 | Status: COMPLETED | OUTPATIENT
Start: 2023-09-21 | End: 2023-09-22

## 2023-09-21 RX ADMIN — HEPARIN SODIUM 5000 UNIT(S): 5000 INJECTION INTRAVENOUS; SUBCUTANEOUS at 22:27

## 2023-09-21 RX ADMIN — PIPERACILLIN AND TAZOBACTAM 25 GRAM(S): 4; .5 INJECTION, POWDER, LYOPHILIZED, FOR SOLUTION INTRAVENOUS at 23:47

## 2023-09-21 RX ADMIN — Medication 2 PACKET(S): at 17:17

## 2023-09-21 RX ADMIN — CHLORHEXIDINE GLUCONATE 1 APPLICATION(S): 213 SOLUTION TOPICAL at 06:08

## 2023-09-21 RX ADMIN — PIPERACILLIN AND TAZOBACTAM 25 GRAM(S): 4; .5 INJECTION, POWDER, LYOPHILIZED, FOR SOLUTION INTRAVENOUS at 06:07

## 2023-09-21 RX ADMIN — Medication 100 MILLIGRAM(S): at 15:14

## 2023-09-21 RX ADMIN — Medication 150 MILLIGRAM(S): at 22:28

## 2023-09-21 RX ADMIN — Medication 100 MILLIGRAM(S): at 06:08

## 2023-09-21 RX ADMIN — FAMOTIDINE 20 MILLIGRAM(S): 10 INJECTION INTRAVENOUS at 15:19

## 2023-09-21 RX ADMIN — Medication 100 MILLIGRAM(S): at 22:28

## 2023-09-21 RX ADMIN — PIPERACILLIN AND TAZOBACTAM 25 GRAM(S): 4; .5 INJECTION, POWDER, LYOPHILIZED, FOR SOLUTION INTRAVENOUS at 15:20

## 2023-09-21 RX ADMIN — Medication 150 MILLIGRAM(S): at 06:08

## 2023-09-21 NOTE — PROGRESS NOTE ADULT - SUBJECTIVE AND OBJECTIVE BOX
S: Patient seen and examined at bedside. IR drainage yesterday, 25 cc yellowish output and pigtail placement. No acute overnight events. HD stable, afebrile. Feeling well, having BM and passing gas. Tolerating diet.     O:  Vital Signs Last 24 Hrs  T(C): 36.6 (21 Sep 2023 06:00), Max: 36.8 (20 Sep 2023 08:55)  T(F): 97.9 (21 Sep 2023 06:00), Max: 98.3 (20 Sep 2023 15:28)  HR: 75 (21 Sep 2023 06:00) (69 - 88)  BP: 152/74 (21 Sep 2023 06:00) (124/66 - 153/63)  BP(mean): 100 (21 Sep 2023 06:00) (92 - 100)  RR: 18 (21 Sep 2023 06:00) (10 - 22)  SpO2: 93% (21 Sep 2023 06:00) (93% - 95%)    Parameters below as of 21 Sep 2023 00:00  Patient On (Oxygen Delivery Method): room air        I&O's Detail    20 Sep 2023 07:01  -  21 Sep 2023 07:00  --------------------------------------------------------  IN:  Total IN: 0 mL    OUT:    Bulb (mL): 5 mL    Bulb (mL): 55 mL    Drain (mL): 12 mL    Stool (mL): 1 mL  Total OUT: 73 mL    Total NET: -73 mL        Physical Examination:  Gen: Awake, alert, oriented x3 and in no acute distress  Pulm: Normal work of breathing on room air, no respiratory distress  Abd: Soft, non-distended, no tenderness. No guarding, rigidity, or rebound. MARY LOU x2 in place w SS output. RLQ pigtail drain yellowish output. Incision sites CDI  Ext: Moving all extremities equally  Neuro: GCS 15, no focal deficits, mental status markedly improved compared to initial delirium                                    9.6    12.60 )-----------( 416      ( 21 Sep 2023 05:21 )             29.8   09-21    143  |  107  |  4<L>  ----------------------------<  93  3.5   |  28  |  0.55    Ca    8.2<L>      21 Sep 2023 05:21  Phos  2.8     09-21  Mg     2.1     09-21

## 2023-09-21 NOTE — PROGRESS NOTE ADULT - SUBJECTIVE AND OBJECTIVE BOX
INTERVAL HPI/OVERNIGHT EVENTS:  Patient seen,afebrile,no acute issues  VITAL SIGNS:  T(F): 98.6 (09-21-23 @ 12:40)  HR: 99 (09-21-23 @ 12:40)  BP: 128/70 (09-21-23 @ 12:40)  RR: 18 (09-21-23 @ 12:40)  SpO2: 92% (09-21-23 @ 12:40)  Wt(kg): --    PHYSICAL EXAM:  awake  Constitutional:  Eyes:  ENMT:perrla  Neck:  Respiratory:clear  Cardiovascular:s1s2,m-none  Gastrointestinal:soft,bs pos,milfd tenderness,drainage in place  Extremities:  Vascular:  Neurological:no focal deficit  Musculoskeletal:    MEDICATIONS  (STANDING):  chlorhexidine 2% Cloths 1 Application(s) Topical <User Schedule>  famotidine    Tablet 20 milliGRAM(s) Oral daily  heparin   Injectable 5000 Unit(s) SubCutaneous every 8 hours  lactated ringers. 1000 milliLiter(s) (75 mL/Hr) IV Continuous <Continuous>  phenazopyridine 100 milliGRAM(s) Oral every 8 hours  piperacillin/tazobactam IVPB.. 3.375 Gram(s) IV Intermittent every 8 hours  potassium phosphate / sodium phosphate Powder (PHOS-NaK) 2 Packet(s) Oral two times a day  potassium phosphate / sodium phosphate Powder (PHOS-NaK) 2 Packet(s) Oral once  pregabalin 150 milliGRAM(s) Oral three times a day    MEDICATIONS  (PRN):  acetaminophen     Tablet .. 650 milliGRAM(s) Oral every 6 hours PRN Temp greater or equal to 38C (100.4F), Mild Pain (1 - 3)  morphine  - Injectable 2 milliGRAM(s) IV Push every 6 hours PRN Severe Pain (7 - 10)      Allergies    sulfa drugs (Unknown)    Intolerances        LABS:                        9.6    12.60 )-----------( 416      ( 21 Sep 2023 05:21 )             29.8     09-21    143  |  107  |  4<L>  ----------------------------<  93  3.5   |  28  |  0.55    Ca    8.2<L>      21 Sep 2023 05:21  Phos  2.8     09-21  Mg     2.1     09-21      PT/INR - ( 20 Sep 2023 08:22 )   PT: 10.9 sec;   INR: 0.95 ratio         PTT - ( 20 Sep 2023 08:22 )  PTT:30.4 sec  Urinalysis Basic - ( 21 Sep 2023 05:21 )    Color: x / Appearance: x / SG: x / pH: x  Gluc: 93 mg/dL / Ketone: x  / Bili: x / Urobili: x   Blood: x / Protein: x / Nitrite: x   Leuk Esterase: x / RBC: x / WBC x   Sq Epi: x / Non Sq Epi: x / Bacteria: x        RADIOLOGY & ADDITIONAL TESTS:      Assessment and Plan:   · Assessment	  · Assessment    Patient is a 72 yo female with hx of COPD, not on home oxygen, neuropathy presents with abdominal pain. Upon evaluation in ED, patient noted with temp of 101.8, BP of 127/66, saturating 91% on Room air with hr of 124bpm. Physical examination noted with diffuse tenderness in abdominal region with guarding. Labs significant for leukocytosis of 13, lactate of 3.5 and scr of 1.41. CT abdomen and pelvis noted for free air in peritoneum and findings for perforated acute appendicitis with fidings of 6mm gall bladder lesion and right adnexal lesion. CXR negative. Surgery consulted and patient is scheduled for urgent ex lap, abdominal washout and possible bowel resection. ICU consulted for sepsis 2/2 perforated appendicitis. Patient admitted to ICU for sepsis 2/2 acute appendicitis.     #Acute Encephalopathy   #Sepsis  #Acute perforated appendicitis  #COPD  #Neuropathy  # Elevated lactate  # Gall bladder wall lesion   # MAYRA  #RIght Adnexal Lesion    ============Pulmonology===========  # Hx of COPD -stable  - patient with hx of COPD   - Chest X-ray shows same findings as previous, increased central interstitial markings.    =========GI=============  # Sepsis 2/2 Perforated acute appendicitis   - Pt underwent laparoscopic appendectomy and washout  - peritoneum full of feculent pus according to surgeon, Dr. Sunshine  - IVF hydration   - continue with zosyn   - ID consulted Dr. Moffett   - Pain control with morphine  -s/p abscess drainage,stable    #Gallbladder wall lesion  - patient on CT noted with Focal 6 mm enhancing lesion at the gallbladder fundal wall  - Consider MR abdomen when clinically stable      ==========Renal==========  #MAYRA  - patient noted to have Scr of 1.41 on admission  - avoid nephrotoxic agents  - Cr trended down    =========Infectious Disease===========  # Sepsis 2/2 Perforated acute appendicitis   - noted with findings of acute perforated appendicitis on CT scan  - hemodynamically stable, not requiring pressors   - Clear liquid diet  - IVF hydration   - s/p diagnostic laparoscopy with appendectomy and washout  - trend lactate  - ID consulted Dr. Moffett   - continue with zosyn   - will f/u for sensitivities  s/p ct scan-results noticed  pending for possiblel drainage of collection by IR    =========Reproductive===========  #Right Adnexal lesion  - CT abdomen showing 5.2 cm soft tissue density structure in the right adnexa may represent an exophytic uterine fibroid, or a right ovarian mass  - f/u pelvic US when stable     ========Prophylaxis=======  - heparin subq    Code Status: Full Code

## 2023-09-21 NOTE — PROGRESS NOTE ADULT - ASSESSMENT
A: 73F s/p lap appy for perforated appendicitis and peritonitis c/b intraabdominal abscess    P:  - C/w surgical floor  - Pain control  - Vitals Q4h  - C/w zosyn  - Diet  - IS   - PT  - C/w trend MARY LOU and Pigtail output and quality - currently SS    will remove 1 MARY LOU drain today

## 2023-09-22 ENCOUNTER — TRANSCRIPTION ENCOUNTER (OUTPATIENT)
Age: 73
End: 2023-09-22

## 2023-09-22 VITALS
SYSTOLIC BLOOD PRESSURE: 143 MMHG | DIASTOLIC BLOOD PRESSURE: 76 MMHG | RESPIRATION RATE: 18 BRPM | TEMPERATURE: 98 F | OXYGEN SATURATION: 94 % | HEART RATE: 99 BPM

## 2023-09-22 LAB
-  AMIKACIN: SIGNIFICANT CHANGE UP
-  AMOXICILLIN/CLAVULANIC ACID: SIGNIFICANT CHANGE UP
-  AMPICILLIN/SULBACTAM: SIGNIFICANT CHANGE UP
-  AMPICILLIN: SIGNIFICANT CHANGE UP
-  AZTREONAM: SIGNIFICANT CHANGE UP
-  CEFAZOLIN: SIGNIFICANT CHANGE UP
-  CEFEPIME: SIGNIFICANT CHANGE UP
-  CEFOXITIN: SIGNIFICANT CHANGE UP
-  CEFTRIAXONE: SIGNIFICANT CHANGE UP
-  CIPROFLOXACIN: SIGNIFICANT CHANGE UP
-  ERTAPENEM: SIGNIFICANT CHANGE UP
-  GENTAMICIN: SIGNIFICANT CHANGE UP
-  IMIPENEM: SIGNIFICANT CHANGE UP
-  LEVOFLOXACIN: SIGNIFICANT CHANGE UP
-  MEROPENEM: SIGNIFICANT CHANGE UP
-  PIPERACILLIN/TAZOBACTAM: SIGNIFICANT CHANGE UP
-  TOBRAMYCIN: SIGNIFICANT CHANGE UP
-  TRIMETHOPRIM/SULFAMETHOXAZOLE: SIGNIFICANT CHANGE UP
ANION GAP SERPL CALC-SCNC: 8 MMOL/L — SIGNIFICANT CHANGE UP (ref 5–17)
BUN SERPL-MCNC: 3 MG/DL — LOW (ref 7–18)
CALCIUM SERPL-MCNC: 8.5 MG/DL — SIGNIFICANT CHANGE UP (ref 8.4–10.5)
CHLORIDE SERPL-SCNC: 107 MMOL/L — SIGNIFICANT CHANGE UP (ref 96–108)
CO2 SERPL-SCNC: 28 MMOL/L — SIGNIFICANT CHANGE UP (ref 22–31)
CREAT SERPL-MCNC: 0.58 MG/DL — SIGNIFICANT CHANGE UP (ref 0.5–1.3)
EGFR: 95 ML/MIN/1.73M2 — SIGNIFICANT CHANGE UP
GLUCOSE SERPL-MCNC: 107 MG/DL — HIGH (ref 70–99)
HCT VFR BLD CALC: 30.5 % — LOW (ref 34.5–45)
HGB BLD-MCNC: 9.9 G/DL — LOW (ref 11.5–15.5)
MAGNESIUM SERPL-MCNC: 2.2 MG/DL — SIGNIFICANT CHANGE UP (ref 1.6–2.6)
MCHC RBC-ENTMCNC: 28.8 PG — SIGNIFICANT CHANGE UP (ref 27–34)
MCHC RBC-ENTMCNC: 32.5 GM/DL — SIGNIFICANT CHANGE UP (ref 32–36)
MCV RBC AUTO: 88.7 FL — SIGNIFICANT CHANGE UP (ref 80–100)
METHOD TYPE: SIGNIFICANT CHANGE UP
NRBC # BLD: 0 /100 WBCS — SIGNIFICANT CHANGE UP (ref 0–0)
PHOSPHATE SERPL-MCNC: 3.6 MG/DL — SIGNIFICANT CHANGE UP (ref 2.5–4.5)
PLATELET # BLD AUTO: 408 K/UL — HIGH (ref 150–400)
POTASSIUM SERPL-MCNC: 3.5 MMOL/L — SIGNIFICANT CHANGE UP (ref 3.5–5.3)
POTASSIUM SERPL-SCNC: 3.5 MMOL/L — SIGNIFICANT CHANGE UP (ref 3.5–5.3)
RBC # BLD: 3.44 M/UL — LOW (ref 3.8–5.2)
RBC # FLD: 14.7 % — HIGH (ref 10.3–14.5)
SODIUM SERPL-SCNC: 143 MMOL/L — SIGNIFICANT CHANGE UP (ref 135–145)
WBC # BLD: 11.32 K/UL — HIGH (ref 3.8–10.5)
WBC # FLD AUTO: 11.32 K/UL — HIGH (ref 3.8–10.5)

## 2023-09-22 PROCEDURE — 77012 CT SCAN FOR NEEDLE BIOPSY: CPT

## 2023-09-22 PROCEDURE — 87015 SPECIMEN INFECT AGNT CONCNTJ: CPT

## 2023-09-22 PROCEDURE — 87070 CULTURE OTHR SPECIMN AEROBIC: CPT

## 2023-09-22 PROCEDURE — 86850 RBC ANTIBODY SCREEN: CPT

## 2023-09-22 PROCEDURE — 80048 BASIC METABOLIC PNL TOTAL CA: CPT

## 2023-09-22 PROCEDURE — 97116 GAIT TRAINING THERAPY: CPT

## 2023-09-22 PROCEDURE — 87205 SMEAR GRAM STAIN: CPT

## 2023-09-22 PROCEDURE — 82803 BLOOD GASES ANY COMBINATION: CPT

## 2023-09-22 PROCEDURE — 71045 X-RAY EXAM CHEST 1 VIEW: CPT

## 2023-09-22 PROCEDURE — 87086 URINE CULTURE/COLONY COUNT: CPT

## 2023-09-22 PROCEDURE — 88304 TISSUE EXAM BY PATHOLOGIST: CPT

## 2023-09-22 PROCEDURE — C1889: CPT

## 2023-09-22 PROCEDURE — 87206 SMEAR FLUORESCENT/ACID STAI: CPT

## 2023-09-22 PROCEDURE — 83605 ASSAY OF LACTIC ACID: CPT

## 2023-09-22 PROCEDURE — 96374 THER/PROPH/DIAG INJ IV PUSH: CPT

## 2023-09-22 PROCEDURE — 86900 BLOOD TYPING SEROLOGIC ABO: CPT

## 2023-09-22 PROCEDURE — 93005 ELECTROCARDIOGRAM TRACING: CPT

## 2023-09-22 PROCEDURE — 87150 DNA/RNA AMPLIFIED PROBE: CPT

## 2023-09-22 PROCEDURE — 87077 CULTURE AEROBIC IDENTIFY: CPT

## 2023-09-22 PROCEDURE — 80053 COMPREHEN METABOLIC PANEL: CPT

## 2023-09-22 PROCEDURE — 97530 THERAPEUTIC ACTIVITIES: CPT

## 2023-09-22 PROCEDURE — 85610 PROTHROMBIN TIME: CPT

## 2023-09-22 PROCEDURE — 87040 BLOOD CULTURE FOR BACTERIA: CPT

## 2023-09-22 PROCEDURE — 87116 MYCOBACTERIA CULTURE: CPT

## 2023-09-22 PROCEDURE — 85730 THROMBOPLASTIN TIME PARTIAL: CPT

## 2023-09-22 PROCEDURE — 84100 ASSAY OF PHOSPHORUS: CPT

## 2023-09-22 PROCEDURE — 97163 PT EVAL HIGH COMPLEX 45 MIN: CPT

## 2023-09-22 PROCEDURE — 87641 MR-STAPH DNA AMP PROBE: CPT

## 2023-09-22 PROCEDURE — 85025 COMPLETE CBC W/AUTO DIFF WBC: CPT

## 2023-09-22 PROCEDURE — 87640 STAPH A DNA AMP PROBE: CPT

## 2023-09-22 PROCEDURE — 81001 URINALYSIS AUTO W/SCOPE: CPT

## 2023-09-22 PROCEDURE — 83735 ASSAY OF MAGNESIUM: CPT

## 2023-09-22 PROCEDURE — 82962 GLUCOSE BLOOD TEST: CPT

## 2023-09-22 PROCEDURE — 74177 CT ABD & PELVIS W/CONTRAST: CPT | Mod: MA

## 2023-09-22 PROCEDURE — 0225U NFCT DS DNA&RNA 21 SARSCOV2: CPT

## 2023-09-22 PROCEDURE — 93971 EXTREMITY STUDY: CPT

## 2023-09-22 PROCEDURE — 97110 THERAPEUTIC EXERCISES: CPT

## 2023-09-22 PROCEDURE — 87186 SC STD MICRODIL/AGAR DIL: CPT

## 2023-09-22 PROCEDURE — 10160 PNXR ASPIR ABSC HMTMA BULLA: CPT

## 2023-09-22 PROCEDURE — 87075 CULTR BACTERIA EXCEPT BLOOD: CPT

## 2023-09-22 PROCEDURE — 86901 BLOOD TYPING SEROLOGIC RH(D): CPT

## 2023-09-22 PROCEDURE — 85027 COMPLETE CBC AUTOMATED: CPT

## 2023-09-22 PROCEDURE — 99285 EMERGENCY DEPT VISIT HI MDM: CPT | Mod: 25

## 2023-09-22 PROCEDURE — 94640 AIRWAY INHALATION TREATMENT: CPT

## 2023-09-22 PROCEDURE — 36415 COLL VENOUS BLD VENIPUNCTURE: CPT

## 2023-09-22 RX ORDER — METRONIDAZOLE 500 MG
1 TABLET ORAL
Qty: 30 | Refills: 0
Start: 2023-09-22 | End: 2023-10-01

## 2023-09-22 RX ORDER — POTASSIUM CHLORIDE 20 MEQ
40 PACKET (EA) ORAL ONCE
Refills: 0 | Status: COMPLETED | OUTPATIENT
Start: 2023-09-22 | End: 2023-09-22

## 2023-09-22 RX ORDER — LEVOFLOXACIN 5 MG/ML
1 INJECTION, SOLUTION INTRAVENOUS
Qty: 10 | Refills: 0
Start: 2023-09-22 | End: 2023-10-01

## 2023-09-22 RX ADMIN — HEPARIN SODIUM 5000 UNIT(S): 5000 INJECTION INTRAVENOUS; SUBCUTANEOUS at 05:31

## 2023-09-22 RX ADMIN — PIPERACILLIN AND TAZOBACTAM 25 GRAM(S): 4; .5 INJECTION, POWDER, LYOPHILIZED, FOR SOLUTION INTRAVENOUS at 06:40

## 2023-09-22 RX ADMIN — PIPERACILLIN AND TAZOBACTAM 25 GRAM(S): 4; .5 INJECTION, POWDER, LYOPHILIZED, FOR SOLUTION INTRAVENOUS at 11:45

## 2023-09-22 RX ADMIN — Medication 2 PACKET(S): at 05:31

## 2023-09-22 RX ADMIN — CHLORHEXIDINE GLUCONATE 1 APPLICATION(S): 213 SOLUTION TOPICAL at 05:31

## 2023-09-22 RX ADMIN — FAMOTIDINE 20 MILLIGRAM(S): 10 INJECTION INTRAVENOUS at 11:44

## 2023-09-22 RX ADMIN — Medication 40 MILLIEQUIVALENT(S): at 11:44

## 2023-09-22 RX ADMIN — Medication 100 MILLIGRAM(S): at 05:31

## 2023-09-22 NOTE — DISCHARGE NOTE NURSING/CASE MANAGEMENT/SOCIAL WORK - PATIENT PORTAL LINK FT
You can access the FollowMyHealth Patient Portal offered by [facility] by registering at the following website: [email or web address]. By joining [facility]’s FollowMyHealth portal, you will also be able to view your health information using other applications (apps) compatible with our system.

## 2023-09-22 NOTE — PROGRESS NOTE ADULT - ASSESSMENT
73F s/p lap appy for perforated appendicitis and peritonitis c/b intraabdominal abscess    P:  - C/w surgical floor  - Pain control  - Vitals Q4h  - C/w zosyn in house, plan to discharge with PO abx  - Diet  - IS   - PT  -  removed 1 MARY LOU drain today, will go home with RLQ pigtail    Discharge home

## 2023-09-22 NOTE — PROGRESS NOTE ADULT - SUBJECTIVE AND OBJECTIVE BOX
S: Patient seen and examined at bedside. No acute overnight events. HD stable, afebrile. Feeling well, having BM and passing gas. Tolerating diet. Ready to go home    O:  Vital Signs Last 24 Hrs  T(C): 36.7 (22 Sep 2023 05:21), Max: 37.3 (21 Sep 2023 20:45)  T(F): 98 (22 Sep 2023 05:21), Max: 99.1 (21 Sep 2023 20:45)  HR: 81 (22 Sep 2023 05:21) (81 - 99)  BP: 158/72 (22 Sep 2023 05:21) (128/70 - 163/80)  BP(mean): 108 (21 Sep 2023 20:45) (108 - 108)  RR: 17 (22 Sep 2023 05:21) (17 - 18)  SpO2: 91% (22 Sep 2023 05:21) (91% - 92%)  O2 Parameters below as of 22 Sep 2023 05:21  Patient On (Oxygen Delivery Method): room air    I&O's Detail    21 Sep 2023 07:01  -  22 Sep 2023 07:00  --------------------------------------------------------  IN:  Total IN: 0 mL    OUT:    Bulb (mL): 15 mL    Drain (mL): 0 mL  Total OUT: 15 mL    Total NET: -15 mL    Physical Examination:  Gen: Awake, alert, oriented x3 and in no acute distress  Pulm: Normal work of breathing on room air, no respiratory distress  Abd: Soft, non-distended, no tenderness. No guarding, rigidity, or rebound. MARY LOU x1 in place w SS output, removed. RLQ pigtail drain yellowish output. Incision sites CDI  Ext: Moving all extremities equally  Neuro: GCS 15, no focal deficits, mental status markedly improved compared to initial delirium                           9.9    11.32 )-----------( 408      ( 22 Sep 2023 07:33 )             30.5     09-22    143  |  107  |  3<L>  ----------------------------<  107<H>  3.5   |  28  |  0.58    Ca    8.5      22 Sep 2023 07:33  Phos  3.6     09-22  Mg     2.2     09-22

## 2023-09-22 NOTE — DISCHARGE NOTE NURSING/CASE MANAGEMENT/SOCIAL WORK - NSDCPEFALRISK_GEN_ALL_CORE
For information on Fall & Injury Prevention, visit: [email or web address] OR  [email or web address] OR  https://www.cdc.gov/steadi/patient.html

## 2023-09-22 NOTE — PROGRESS NOTE ADULT - PROVIDER SPECIALTY LIST ADULT
Internal Medicine
Intervent Radiology
Surgery
Critical Care
Infectious Disease
Internal Medicine
Surgery
Surgery
Critical Care
Infectious Disease
Internal Medicine
Surgery
Critical Care
Infectious Disease
Infectious Disease
Surgery

## 2023-09-22 NOTE — PHARMACOTHERAPY INTERVENTION NOTE - COMMENTS
[employer] reviewed, no additional interventions at this time.
Recommended re-initiating piperacillin-tazobactam 3.375g IV q8h, dosed based on an eGFR of 95mL/min/m2, in the setting of Bacteroides fragilis bacteremia and intra-abdominal infection.     Peter Harris PharmD  Clinical Pharmacy Specialist, Infectious Diseases  Tele-Antimicrobial Stewardship Program (Tele-ASP)  Tele-ASP Phone: [phone] 
Informed prescriber about the  pregabalin order per report and to reorder, if medically necessary.
Start extended infusion 4 hours after loading dose  eGFR 39 mL/min/1.73m2

## 2023-09-22 NOTE — PROGRESS NOTE ADULT - REASON FOR ADMISSION
Sepsis 2/2 Perforated Appendicitis

## 2023-09-25 PROBLEM — I10 ESSENTIAL (PRIMARY) HYPERTENSION: Chronic | Status: ACTIVE | Noted: 2023-09-11

## 2023-09-25 PROBLEM — Z86.69 PERSONAL HISTORY OF OTHER DISEASES OF THE NERVOUS SYSTEM AND SENSE ORGANS: Chronic | Status: ACTIVE | Noted: 2023-09-11

## 2023-09-25 PROBLEM — E78.5 HYPERLIPIDEMIA, UNSPECIFIED: Chronic | Status: ACTIVE | Noted: 2023-09-11

## 2023-09-25 PROBLEM — Z87.09 PERSONAL HISTORY OF OTHER DISEASES OF THE RESPIRATORY SYSTEM: Chronic | Status: ACTIVE | Noted: 2023-09-11

## 2023-09-25 LAB
CULTURE RESULTS: SIGNIFICANT CHANGE UP
ORGANISM # SPEC MICROSCOPIC CNT: SIGNIFICANT CHANGE UP
ORGANISM # SPEC MICROSCOPIC CNT: SIGNIFICANT CHANGE UP
SPECIMEN SOURCE: SIGNIFICANT CHANGE UP

## 2023-09-26 PROBLEM — Z00.00 ENCOUNTER FOR PREVENTIVE HEALTH EXAMINATION: Status: ACTIVE | Noted: 2023-09-26

## 2023-10-04 ENCOUNTER — APPOINTMENT (OUTPATIENT)
Age: 73
End: 2023-10-04

## 2023-10-04 VITALS
WEIGHT: 149 LBS | SYSTOLIC BLOOD PRESSURE: 115 MMHG | DIASTOLIC BLOOD PRESSURE: 68 MMHG | HEART RATE: 97 BPM | HEIGHT: 67 IN | TEMPERATURE: 99.2 F | OXYGEN SATURATION: 94 % | BODY MASS INDEX: 23.39 KG/M2

## 2023-10-04 DIAGNOSIS — T81.49XA INFECTION FOLLOWING A PROCEDURE, OTHER SURGICAL SITE, INITIAL ENCOUNTER: ICD-10-CM

## 2023-10-04 DIAGNOSIS — Z87.891 PERSONAL HISTORY OF NICOTINE DEPENDENCE: ICD-10-CM

## 2023-10-04 DIAGNOSIS — Z80.8 FAMILY HISTORY OF MALIGNANT NEOPLASM OF OTHER ORGANS OR SYSTEMS: ICD-10-CM

## 2023-10-04 DIAGNOSIS — I10 ESSENTIAL (PRIMARY) HYPERTENSION: ICD-10-CM

## 2023-10-04 DIAGNOSIS — Z82.49 FAMILY HISTORY OF ISCHEMIC HEART DISEASE AND OTHER DISEASES OF THE CIRCULATORY SYSTEM: ICD-10-CM

## 2023-10-04 DIAGNOSIS — Z09 ENCOUNTER FOR FOLLOW-UP EXAMINATION AFTER COMPLETED TREATMENT FOR CONDITIONS OTHER THAN MALIGNANT NEOPLASM: ICD-10-CM

## 2023-10-04 DIAGNOSIS — Z83.3 FAMILY HISTORY OF DIABETES MELLITUS: ICD-10-CM

## 2023-10-04 DIAGNOSIS — J44.9 CHRONIC OBSTRUCTIVE PULMONARY DISEASE, UNSPECIFIED: ICD-10-CM

## 2023-10-04 DIAGNOSIS — R50.82 POSTPROCEDURAL FEVER: ICD-10-CM

## 2023-10-04 DIAGNOSIS — Z80.52 FAMILY HISTORY OF MALIGNANT NEOPLASM OF BLADDER: ICD-10-CM

## 2023-10-04 LAB
HCT VFR BLD CALC: 32.6 %
HGB BLD-MCNC: 10.3 G/DL
MCHC RBC-ENTMCNC: 28.7 PG
MCHC RBC-ENTMCNC: 31.6 GM/DL
MCV RBC AUTO: 90.8 FL
PLATELET # BLD AUTO: 317 K/UL
RBC # BLD: 3.59 M/UL
RBC # FLD: 15.7 %
WBC # FLD AUTO: 8.23 K/UL

## 2023-10-04 RX ORDER — METRONIDAZOLE 500 MG/1
500 TABLET ORAL
Refills: 0 | Status: ACTIVE | COMMUNITY

## 2023-10-04 RX ORDER — AMOXICILLIN AND CLAVULANATE POTASSIUM 875; 125 MG/1; MG/1
875-125 TABLET, COATED ORAL
Qty: 20 | Refills: 0 | Status: ACTIVE | COMMUNITY
Start: 2023-10-04 | End: 1900-01-01

## 2023-10-04 RX ORDER — DIPHENHYDRAMINE HCL 25 MG/1
25 CAPSULE ORAL EVERY 8 HOURS
Qty: 90 | Refills: 0 | Status: ACTIVE | COMMUNITY
Start: 2023-10-04 | End: 1900-01-01

## 2023-10-04 RX ORDER — LISINOPRIL 20 MG/1
20 TABLET ORAL
Refills: 0 | Status: ACTIVE | COMMUNITY

## 2023-10-04 RX ORDER — LEVOFLOXACIN 500 MG/1
500 TABLET, FILM COATED ORAL
Refills: 0 | Status: ACTIVE | COMMUNITY

## 2023-10-04 RX ORDER — AMLODIPINE BESYLATE 5 MG/1
TABLET ORAL
Refills: 0 | Status: ACTIVE | COMMUNITY

## 2023-10-04 RX ORDER — PREGABALIN 300 MG/1
CAPSULE ORAL
Refills: 0 | Status: ACTIVE | COMMUNITY

## 2023-10-05 LAB
ALBUMIN SERPL ELPH-MCNC: 3.8 G/DL
ALP BLD-CCNC: 65 U/L
ALT SERPL-CCNC: 7 U/L
ANION GAP SERPL CALC-SCNC: 11 MMOL/L
AST SERPL-CCNC: 16 U/L
BILIRUB SERPL-MCNC: 0.4 MG/DL
BUN SERPL-MCNC: 13 MG/DL
CALCIUM SERPL-MCNC: 10.8 MG/DL
CHLORIDE SERPL-SCNC: 98 MMOL/L
CO2 SERPL-SCNC: 31 MMOL/L
CREAT SERPL-MCNC: 1.29 MG/DL
EGFR: 44 ML/MIN/1.73M2
GLUCOSE SERPL-MCNC: 105 MG/DL
POTASSIUM SERPL-SCNC: 3.8 MMOL/L
PROT SERPL-MCNC: 6.3 G/DL
SODIUM SERPL-SCNC: 140 MMOL/L

## 2023-10-12 ENCOUNTER — APPOINTMENT (OUTPATIENT)
Dept: CT IMAGING | Facility: IMAGING CENTER | Age: 73
End: 2023-10-12

## 2023-10-20 NOTE — PATIENT PROFILE ADULT - NSPROPTRIGHTBILLOFRIGHTS_GEN_A_NUR
UDS obtained with results of +BUP, +THC.  MD aware.  Consent form signed.   Sublocade 100 mg injection to RLQ of abdomen per MD's instructions     Lot # [id number] EXP: 09/2024  [id number]  VSS  obtained after 10 minutes and pt given discharge instructions.  Pt then discharged to home in stable condition   unknown patient mentally impaired and nonverbal.

## 2023-10-28 LAB
CULTURE RESULTS: SIGNIFICANT CHANGE UP
CULTURE RESULTS: SIGNIFICANT CHANGE UP
SPECIMEN SOURCE: SIGNIFICANT CHANGE UP
SPECIMEN SOURCE: SIGNIFICANT CHANGE UP

## 2023-10-31 ENCOUNTER — APPOINTMENT (OUTPATIENT)
Dept: CT IMAGING | Facility: IMAGING CENTER | Age: 73
End: 2023-10-31
Payer: MEDICARE

## 2023-10-31 ENCOUNTER — OUTPATIENT (OUTPATIENT)
Dept: OUTPATIENT SERVICES | Facility: HOSPITAL | Age: 73
LOS: 1 days | End: 2023-10-31
Payer: MEDICARE

## 2023-10-31 DIAGNOSIS — R50.82 POSTPROCEDURAL FEVER: ICD-10-CM

## 2023-10-31 DIAGNOSIS — T81.49XA INFECTION FOLLOWING A PROCEDURE, OTHER SURGICAL SITE, INITIAL ENCOUNTER: ICD-10-CM

## 2023-10-31 PROCEDURE — 74177 CT ABD & PELVIS W/CONTRAST: CPT | Mod: 26

## 2023-10-31 PROCEDURE — 74177 CT ABD & PELVIS W/CONTRAST: CPT

## 2023-11-01 ENCOUNTER — NON-APPOINTMENT (OUTPATIENT)
Age: 73
End: 2023-11-01

## 2025-01-24 ENCOUNTER — INPATIENT (INPATIENT)
Facility: HOSPITAL | Age: 75
LOS: 4 days | Discharge: HOME | DRG: 641 | End: 2025-01-29
Attending: INTERNAL MEDICINE | Admitting: INTERNAL MEDICINE
Payer: MEDICARE

## 2025-01-24 VITALS
TEMPERATURE: 98 F | RESPIRATION RATE: 18 BRPM | OXYGEN SATURATION: 94 % | HEART RATE: 67 BPM | SYSTOLIC BLOOD PRESSURE: 91 MMHG | DIASTOLIC BLOOD PRESSURE: 59 MMHG | WEIGHT: 134.92 LBS

## 2025-01-24 DIAGNOSIS — Z90.49 ACQUIRED ABSENCE OF OTHER SPECIFIED PARTS OF DIGESTIVE TRACT: Chronic | ICD-10-CM

## 2025-01-24 DIAGNOSIS — E87.1 HYPO-OSMOLALITY AND HYPONATREMIA: ICD-10-CM

## 2025-01-24 DIAGNOSIS — Z86.69 PERSONAL HISTORY OF OTHER DISEASES OF THE NERVOUS SYSTEM AND SENSE ORGANS: ICD-10-CM

## 2025-01-24 DIAGNOSIS — Z29.9 ENCOUNTER FOR PROPHYLACTIC MEASURES, UNSPECIFIED: ICD-10-CM

## 2025-01-24 DIAGNOSIS — R55 SYNCOPE AND COLLAPSE: ICD-10-CM

## 2025-01-24 DIAGNOSIS — I10 ESSENTIAL (PRIMARY) HYPERTENSION: ICD-10-CM

## 2025-01-24 DIAGNOSIS — E78.5 HYPERLIPIDEMIA, UNSPECIFIED: ICD-10-CM

## 2025-01-24 DIAGNOSIS — Z87.09 PERSONAL HISTORY OF OTHER DISEASES OF THE RESPIRATORY SYSTEM: ICD-10-CM

## 2025-01-24 LAB
ALBUMIN SERPL ELPH-MCNC: 3.5 G/DL — SIGNIFICANT CHANGE UP (ref 3.5–5)
ALP SERPL-CCNC: 141 U/L — HIGH (ref 40–120)
ALT FLD-CCNC: 13 U/L DA — SIGNIFICANT CHANGE UP (ref 10–60)
ANION GAP SERPL CALC-SCNC: 10 MMOL/L — SIGNIFICANT CHANGE UP (ref 5–17)
ANION GAP SERPL CALC-SCNC: 6 MMOL/L — SIGNIFICANT CHANGE UP (ref 5–17)
APPEARANCE UR: CLEAR — SIGNIFICANT CHANGE UP
AST SERPL-CCNC: 17 U/L — SIGNIFICANT CHANGE UP (ref 10–40)
BASOPHILS # BLD AUTO: 0.05 K/UL — SIGNIFICANT CHANGE UP (ref 0–0.2)
BASOPHILS NFR BLD AUTO: 0.7 % — SIGNIFICANT CHANGE UP (ref 0–2)
BILIRUB SERPL-MCNC: 0.7 MG/DL — SIGNIFICANT CHANGE UP (ref 0.2–1.2)
BILIRUB UR-MCNC: NEGATIVE — SIGNIFICANT CHANGE UP
BUN SERPL-MCNC: 19 MG/DL — HIGH (ref 7–18)
BUN SERPL-MCNC: 22 MG/DL — HIGH (ref 7–18)
CALCIUM SERPL-MCNC: 9.1 MG/DL — SIGNIFICANT CHANGE UP (ref 8.4–10.5)
CALCIUM SERPL-MCNC: 9.5 MG/DL — SIGNIFICANT CHANGE UP (ref 8.4–10.5)
CHLORIDE SERPL-SCNC: 91 MMOL/L — LOW (ref 96–108)
CHLORIDE SERPL-SCNC: 96 MMOL/L — SIGNIFICANT CHANGE UP (ref 96–108)
CO2 SERPL-SCNC: 23 MMOL/L — SIGNIFICANT CHANGE UP (ref 22–31)
CO2 SERPL-SCNC: 26 MMOL/L — SIGNIFICANT CHANGE UP (ref 22–31)
COLOR SPEC: YELLOW — SIGNIFICANT CHANGE UP
CREAT ?TM UR-MCNC: 67 MG/DL — SIGNIFICANT CHANGE UP
CREAT SERPL-MCNC: 0.78 MG/DL — SIGNIFICANT CHANGE UP (ref 0.5–1.3)
CREAT SERPL-MCNC: 0.82 MG/DL — SIGNIFICANT CHANGE UP (ref 0.5–1.3)
DIFF PNL FLD: NEGATIVE — SIGNIFICANT CHANGE UP
EGFR: 75 ML/MIN/1.73M2 — SIGNIFICANT CHANGE UP
EGFR: 79 ML/MIN/1.73M2 — SIGNIFICANT CHANGE UP
EOSINOPHIL # BLD AUTO: 0.04 K/UL — SIGNIFICANT CHANGE UP (ref 0–0.5)
EOSINOPHIL NFR BLD AUTO: 0.6 % — SIGNIFICANT CHANGE UP (ref 0–6)
GLUCOSE SERPL-MCNC: 92 MG/DL — SIGNIFICANT CHANGE UP (ref 70–99)
GLUCOSE SERPL-MCNC: 99 MG/DL — SIGNIFICANT CHANGE UP (ref 70–99)
GLUCOSE UR QL: NEGATIVE MG/DL — SIGNIFICANT CHANGE UP
HCT VFR BLD CALC: 35.4 % — SIGNIFICANT CHANGE UP (ref 34.5–45)
HGB BLD-MCNC: 12.2 G/DL — SIGNIFICANT CHANGE UP (ref 11.5–15.5)
IMM GRANULOCYTES NFR BLD AUTO: 0.3 % — SIGNIFICANT CHANGE UP (ref 0–0.9)
KETONES UR-MCNC: ABNORMAL MG/DL
LEUKOCYTE ESTERASE UR-ACNC: ABNORMAL
LYMPHOCYTES # BLD AUTO: 1.09 K/UL — SIGNIFICANT CHANGE UP (ref 1–3.3)
LYMPHOCYTES # BLD AUTO: 16 % — SIGNIFICANT CHANGE UP (ref 13–44)
MAGNESIUM SERPL-MCNC: 1.9 MG/DL — SIGNIFICANT CHANGE UP (ref 1.6–2.6)
MCHC RBC-ENTMCNC: 28.1 PG — SIGNIFICANT CHANGE UP (ref 27–34)
MCHC RBC-ENTMCNC: 34.5 G/DL — SIGNIFICANT CHANGE UP (ref 32–36)
MCV RBC AUTO: 81.6 FL — SIGNIFICANT CHANGE UP (ref 80–100)
MONOCYTES # BLD AUTO: 0.55 K/UL — SIGNIFICANT CHANGE UP (ref 0–0.9)
MONOCYTES NFR BLD AUTO: 8.1 % — SIGNIFICANT CHANGE UP (ref 2–14)
NEUTROPHILS # BLD AUTO: 5.05 K/UL — SIGNIFICANT CHANGE UP (ref 1.8–7.4)
NEUTROPHILS NFR BLD AUTO: 74.3 % — SIGNIFICANT CHANGE UP (ref 43–77)
NITRITE UR-MCNC: NEGATIVE — SIGNIFICANT CHANGE UP
NRBC # BLD: 0 /100 WBCS — SIGNIFICANT CHANGE UP (ref 0–0)
NRBC BLD-RTO: 0 /100 WBCS — SIGNIFICANT CHANGE UP (ref 0–0)
NT-PROBNP SERPL-SCNC: 56 PG/ML — SIGNIFICANT CHANGE UP (ref 0–450)
OSMOLALITY SERPL: 262 MOSMOL/KG — LOW (ref 280–301)
OSMOLALITY UR: 421 MOS/KG — SIGNIFICANT CHANGE UP (ref 50–1200)
PH UR: 5 — SIGNIFICANT CHANGE UP (ref 5–8)
PLATELET # BLD AUTO: 277 K/UL — SIGNIFICANT CHANGE UP (ref 150–400)
POTASSIUM SERPL-MCNC: 3.8 MMOL/L — SIGNIFICANT CHANGE UP (ref 3.5–5.3)
POTASSIUM SERPL-MCNC: 4.2 MMOL/L — SIGNIFICANT CHANGE UP (ref 3.5–5.3)
POTASSIUM SERPL-SCNC: 3.8 MMOL/L — SIGNIFICANT CHANGE UP (ref 3.5–5.3)
POTASSIUM SERPL-SCNC: 4.2 MMOL/L — SIGNIFICANT CHANGE UP (ref 3.5–5.3)
POTASSIUM UR-SCNC: 48 MMOL/L — SIGNIFICANT CHANGE UP
PROT ?TM UR-MCNC: 19 MG/DL — HIGH (ref 0–12)
PROT SERPL-MCNC: 7.3 G/DL — SIGNIFICANT CHANGE UP (ref 6–8.3)
PROT UR-MCNC: ABNORMAL MG/DL
PROT/CREAT UR-RTO: 0.3 RATIO — HIGH (ref 0–0.2)
RBC # BLD: 4.34 M/UL — SIGNIFICANT CHANGE UP (ref 3.8–5.2)
RBC # FLD: 13 % — SIGNIFICANT CHANGE UP (ref 10.3–14.5)
SODIUM SERPL-SCNC: 123 MMOL/L — LOW (ref 135–145)
SODIUM SERPL-SCNC: 129 MMOL/L — LOW (ref 135–145)
SODIUM UR-SCNC: 70 MMOL/L — SIGNIFICANT CHANGE UP
SP GR SPEC: 1.02 — SIGNIFICANT CHANGE UP (ref 1–1.03)
TROPONIN I, HIGH SENSITIVITY RESULT: 5.6 NG/L — SIGNIFICANT CHANGE UP
TSH SERPL-MCNC: 2.33 UU/ML — SIGNIFICANT CHANGE UP (ref 0.34–4.82)
UROBILINOGEN FLD QL: 0.2 MG/DL — SIGNIFICANT CHANGE UP (ref 0.2–1)
WBC # BLD: 6.8 K/UL — SIGNIFICANT CHANGE UP (ref 3.8–10.5)
WBC # FLD AUTO: 6.8 K/UL — SIGNIFICANT CHANGE UP (ref 3.8–10.5)

## 2025-01-24 PROCEDURE — 99223 1ST HOSP IP/OBS HIGH 75: CPT | Mod: GC

## 2025-01-24 PROCEDURE — 99285 EMERGENCY DEPT VISIT HI MDM: CPT

## 2025-01-24 PROCEDURE — 71045 X-RAY EXAM CHEST 1 VIEW: CPT | Mod: 26

## 2025-01-24 RX ORDER — ENOXAPARIN SODIUM 100 MG/ML
30 INJECTION SUBCUTANEOUS EVERY 12 HOURS
Refills: 0 | Status: DISCONTINUED | OUTPATIENT
Start: 2025-01-24 | End: 2025-01-24

## 2025-01-24 RX ORDER — PREGABALIN CAPSULES, CV 225 MG/1
150 CAPSULE ORAL THREE TIMES A DAY
Refills: 0 | Status: DISCONTINUED | OUTPATIENT
Start: 2025-01-24 | End: 2025-01-29

## 2025-01-24 RX ORDER — MAGNESIUM, ALUMINUM HYDROXIDE 200-225/5
30 SUSPENSION, ORAL (FINAL DOSE FORM) ORAL EVERY 4 HOURS
Refills: 0 | Status: DISCONTINUED | OUTPATIENT
Start: 2025-01-24 | End: 2025-01-29

## 2025-01-24 RX ORDER — ACETAMINOPHEN 160 MG/5ML
650 SUSPENSION ORAL EVERY 6 HOURS
Refills: 0 | Status: DISCONTINUED | OUTPATIENT
Start: 2025-01-24 | End: 2025-01-29

## 2025-01-24 RX ORDER — ENOXAPARIN SODIUM 100 MG/ML
40 INJECTION SUBCUTANEOUS EVERY 24 HOURS
Refills: 0 | Status: DISCONTINUED | OUTPATIENT
Start: 2025-01-24 | End: 2025-01-29

## 2025-01-24 RX ORDER — ONDANSETRON 4 MG/1
4 TABLET, ORALLY DISINTEGRATING ORAL EVERY 8 HOURS
Refills: 0 | Status: DISCONTINUED | OUTPATIENT
Start: 2025-01-24 | End: 2025-01-29

## 2025-01-24 RX ORDER — ACETAMINOPHEN, DIPHENHYDRAMINE HCL, PHENYLEPHRINE HCL 325; 25; 5 MG/1; MG/1; MG/1
3 TABLET ORAL AT BEDTIME
Refills: 0 | Status: DISCONTINUED | OUTPATIENT
Start: 2025-01-24 | End: 2025-01-29

## 2025-01-24 RX ORDER — BACTERIOSTATIC SODIUM CHLORIDE 0.9 %
1000 VIAL (ML) INJECTION ONCE
Refills: 0 | Status: COMPLETED | OUTPATIENT
Start: 2025-01-24 | End: 2025-01-24

## 2025-01-24 RX ADMIN — ENOXAPARIN SODIUM 40 MILLIGRAM(S): 100 INJECTION SUBCUTANEOUS at 22:33

## 2025-01-24 RX ADMIN — PREGABALIN CAPSULES, CV 150 MILLIGRAM(S): 225 CAPSULE ORAL at 22:34

## 2025-01-24 RX ADMIN — Medication 1000 MILLILITER(S): at 16:09

## 2025-01-24 NOTE — H&P ADULT - NSHPREVIEWOFSYSTEMS_GEN_ALL_CORE
CONSTITUTIONAL: No fever, weight loss, + fatigue   EYES: denies any visual disturbances   ENT:  No difficulty hearing, tinnitus, vertigo; No sinus or throat pain  NECK: No pain or stiffness  RESPIRATORY: No cough, wheezing, chills or hemoptysis; No Shortness of Breath  CARDIOVASCULAR: No chest pain, palpitations, passing out, dizziness, or leg swelling  GASTROINTESTINAL: No abdominal or epigastric pain. No nausea, vomiting, or hematemesis; No diarrhea or constipation. No melena or hematochezia.  GENITOURINARY: No dysuria, + frequency and nocturia   NEUROLOGICAL: No headaches, memory loss, loss of strength, numbness, or tremors  SKIN: No itching, burning, rashes, or lesions   ENDOCRINE: No heat or cold intolerance; No hair loss

## 2025-01-24 NOTE — ED PROVIDER NOTE - PRINCIPAL DIAGNOSIS
Patient:   MARY STERN            MRN: [id number]            FIN: [id number]               Age:   59 years     Sex:  FEMALE     :  56   Associated Diagnoses:   None   Author:   FRANCO, JOSE Henderson Complaint   Abdominal pain and chest pain      Basic Information   History source: Patient.   Medications: Home Medications (9) Active  amoxicillin-clavulanate oral 400-57 mg/5 mL suspension (Augmentin) 10.9 mL, Oral, Q12H  bifidobacterium-lactobacillus oral tablet 1 tab, Oral, Daily  cholecalciferol (vitamin D3) oral 5,000 unit capsule 5,000 unit = 1 cap, Oral, Daily  Freetext Home Medication Medical marijuana  Freetext Home Medication [facility] homeopathic medicine  magnesium oxide oral 400 mg tablet (Mag-Ox 400) 400 mg = 1 tab, Oral, BID  oxybutynin oral 10 mg XL tablet 10 mg = 1 tab, Oral, Daily  oxyCODONE oral 5 mg tablet 5 mg = 1 tab, Oral, As Directed PRN  prochlorperazine oral 10 mg tablet (Compazine) 10 mg = 1 tab, PRN, Oral, QID   .   Allergies: Allergies (2) Active Reaction  NKA None Documented  No Known Medication Allergies None Documented   .   History limitation: None.   Vital signs:   Vitals between:   2018 12:18:05   TO   2018 12:18:05                   LAST RESULT MINIMUM MAXIMUM  Temperature 36.4 36.4 36.6  Heart Rate 73 73 88  Respiratory Rate 18 16 20  NISBP           122 110 138  NIDBP           69 62 78  NIMBP           84 78 96  SpO2                    99 96 99   .      History of Present Illness   The patient presents with.     Abdominal pain and chest pain and  She has problems related to pancreatic cancer  Admitted for further evaluation and         Review of Systems   Constitutional symptoms:  No fever, no chills, no sweats.    Eye symptoms:  Negative except as documented in HPI,  .    ENMT symptoms:  Negative except as documented in HPI,  .    Cardiovascular symptoms:  Negative except as documented in HPI,  .    Respiratory symptoms:  Negative  except as documented in HPI,  .    Gastrointestinal symptoms:  Negative except as documented in HPI,  .    Genitourinary symptoms:  Negative except as documented in HPI,  .    Musculoskeletal symptoms: Negative except as documented in HPI.   Skin symptoms:  Negative except as documented in HPI,  .    Hematologic/Lymphatic symptoms:  Negative except as documented in HPI,  .    Neurologic symptoms:  Negative except as documented in HPI,  .    Endocrine symptoms:  Negative except as documented in HPI,  .    Allergy/Immunologic symptoms:  Negative except as documented in HPI,  .    Psychiatric symptoms:  Negative except as documented in HPI,  .              Additional review of systems information: All other systems reviewed and otherwise negative.      Past Medical/ Family/ Social History   Medical history: Arthritis of spine  Cancer determined by ovarian biopsy  Chemotherapy  Chronic pain  Colostomy  Fibromyalgia  H/O: blood transfusion   .   Surgical history:  .   Family history:  .   Social history:    Alcohol  Details: Use: socially-hasn't had drink in over a year.  Details: Use: Current.  Frequency: 6 times per year.  Exercise  Details: Exercise: Never.  Details: Excercise: Never.  Home/Environment  Details: Smoker in Household: No.  Substance Abuse  Details: Use: medical marijuana-THC.  Type: Marijuana.  Details: Use: None.  Tobacco  Details: Smoked/Smokeless Tobacco Last 30 Days: No.  Smoking Tobacco Use: Former smoker.  Smokeless Tobacco Use Never.  Type: Cigarettes.  Cigarette Packs/Day: 4 Cigarettes or Less Per Day.; Comment(s): quit 5 years ago  Details[facility]  Smoked/Smokeless Tobacco Last 30 Days: No.  Smoking Tobacco Use: Former smoker.  Smokeless Tobacco Use Never.  Type: Cigarettes, quit in 2009..  Details: Smoked/Smokeless Tobacco Last 30 Days: No.  Use: Former smoker.  Type: Cigarettes.  Started Age: 14.0 Years.  Stopped Age: 54 Years.; Comment(s): \"on and off for  years\"  Cultural/Denominational Practices  Details: Denominational or Cultural Practices While in Hospital: No.  Details: Denominational or Cultural Practices: Jainism.  .   Problem list:    Arthritis of spine  Cancer determined by ovarian biopsy  Chemotherapy  Chronic pain  Colostomy  Fibromyalgia  H/O: blood transfusion  .      Physical Examination   General:  Alert,  .    Skin:  Pink, moist, no rash,  .    Head:  Normocephalic,  .    Neck:  Supple, trachea midline, no tenderness, no JVD,  .    Eye:  Pupils are equal, round and reactive to light, normal conjunctiva,  .    Ears, nose, mouth and throat:  Oral mucosa moist, no pharyngeal erythema or exudate,  .    Cardiovascular:  Regular rate and rhythm,  .    Respiratory:  Lungs are clear to auscultation,  .    Gastrointestinal:  Soft, Nontender, Non distended,  .    Genitourinary:  No tenderness,  .    Back:  Nontender,  .    Musculoskeletal:  Normal ROM, normal strength,  .    Neurological:  Alert and oriented to person, place, time, and situation, No focal neurological deficit observed, normal sensory observed, normal motor observed, normal speech observed, normal coordination observed.    Psychiatric:  Cooperative, appropriate mood & affect, normal judgment, non-suicidal,  .    Lymphatics:  No lymphadenopathy,  .       Medical Decision Making     Labs between:  11-JUL-2018 12:18 to 12-JUL-2018 12:18    CBC:                 WBC  HgB  Hct  Plt  MCV  RDW   12-JUL-2018 (L) 1.4  (L) 11.3  (L) 31.4  202  85.8  12.9     DIFF:                 Seg  Neutroph//ABS  Lymph//ABS  Mono//ABS  EOS/ABS   12-JUL-2018 70  // (L) 1.0  // (L) 0.3  // (L) 0.0  // 0.1    BMP:                 Na  Cl  BUN  Glu   12-JUL-2018 139  106  15  (H) 122                              K  CO2  Cr  Ca                              (L) 3.3  23  (H) 1.06  9.1     CMP:                 AST  ALT  AlkPhos  Bili  Albumin   12-JUL-2018 (H) 75  (H) 107  114  (H) 1.5  (L) 3.2     Other Chem:             Mg  Phos   Triglycerides  GGTP  DirectBili                           (L) 1.6                                 Impression and Plan    Chest pain and  Pancytopenia  Hypokalemia  Abnormal liver enzymes  Low magnesium  Pancreatic cancer  PAdmitted for evaluation by cardiology  Very poor ovostic indicators and  Replace potassium  Replace magnesium  Follow electrolytes  Discussed with the patient  Discharge was cleared by cardiology            Electronically Signed On 07/19/2018 13:56  __________________________________________________   JOSE GAMEZ     Hyponatremia

## 2025-01-24 NOTE — H&P ADULT - HISTORY OF PRESENT ILLNESS
73 y/o hx of COPD ( current smoker), Peripheral Neuropathy, HTN present w/ chief complain of syncopal episode for two minutes, Pt was at her sister house accompanied by , recalls that she began to feel lightheaded when her sister began to ask her if she was feeling ok, she sat down at the chair and does not recall LOC however,  witnessess (  and Sister) reports that patient LOC lasted about two minutes. No abnormal movements were noted, urine or fecal incontinence. After two minutes, patient recovered consciousness w/o post-ictal symptoms. Patient denies any prodromal symptoms except for lightheadedness and blurry vision. Pt reports that she took her BP medication that morning, and that she has not had anything to eat since the day before. Patient admits to poor oral intake, with diet consisting of small bites, lots of tea and crackers.  Patient reports having syncopal episodes when she was younger but contributed to just being easily fainted. Patient denies fevers, chills, chest pain, sob, abdominal pain, nausea/vomiting, diarrhea or constipation. Pt endorsed urinary frequency and nocturia, but denies dysuria.     At ED, VS notable for hypotension 91/51  CMP w/ Na of 123 Serum Osm 262  UA negative for Infx, some protenuria.   s/p 1L NS      75 y/o hx of COPD ( current smoker), Peripheral Neuropathy, HTN present w/ chief complain of syncopal episode for two minutes, Pt was at her sister house accompanied by , recalls that she began to feel lightheaded when her sister began to ask her if she was feeling ok, she sat down at the chair and does not recall LOC however,  witnesses (  and Sister) reports that patient LOC lasted about two minutes. No abnormal movements were noted, urine or fecal incontinence. After two minutes, patient recovered consciousness w/o post-ictal symptoms. Patient denies any prodromal symptoms except for lightheadedness and blurry vision. Pt reports that she took her BP medication that morning, and that she has not had anything to eat since the day before. Patient admits to poor oral intake, with diet consisting of small bites, lots of tea and crackers.  Patient reports having syncopal episodes when she was younger but contributed to just being easily fainted. Patient denies fevers, chills, chest pain, sob, abdominal pain, nausea/vomiting, diarrhea or constipation. Pt endorsed urinary frequency and nocturia, but denies dysuria.     At ED, VS notable for hypotension 91/51  CMP w/ Na of 123 Serum Osm 262, Glucose 103  UA negative for Infx, some protenuria.   s/p 1L NS

## 2025-01-24 NOTE — PATIENT PROFILE ADULT - VISION (WITH CORRECTIVE LENSES IF THE PATIENT USUALLY WEARS THEM):
Last refilled 7/12/18  Last seen 12/8/17  Unable to view pdmp  Will escribe if approved   Normal vision: sees adequately in most situations; can see medication labels, newsprint

## 2025-01-24 NOTE — H&P ADULT - PROBLEM SELECTOR PLAN 3
Hold BP meds in the setting of hypotension Hold BP meds in the setting of hypotension    need med rec

## 2025-01-24 NOTE — ED PROVIDER NOTE - OBJECTIVE STATEMENT
74-year-old woman with a past medical history of hypertension active smoker presenting after episode of syncope.  She reports she took her blood pressure medication this morning as per usual but did not really eat much.  She then went to go have lunch with her sister still having not eaten much through the day and had a little bit of wine and suddenly felt lightheaded and with some changes in her vision.  She denies any chest pains palpitations or shortness of breath.  She lost consciousness while seated for 1 to 2 minutes and on awakening rapidly turned back to normal.  At this point she is reporting feeling slightly exhausted but denying any chest pains shortness of breath or other active symptoms.  She has not had any similar episodes in the past she reports she normally takes her blood pressure medication in the morning with more food than this

## 2025-01-24 NOTE — H&P ADULT - NSHPPOAPULMEMBOLUS_GEN_A_CORE
home med amlodipine 10mg, losartan 100mg     -Hold iso of active GIB. home med amlodipine 10mg, losartan 100mg     -Hold iso of active GIB. home med amlodipine 10mg, losartan 100mg     -Hold iso of active GIB. home med amlodipine 10mg, losartan 100mg     -Hold iso of active GIB. no

## 2025-01-24 NOTE — H&P ADULT - NSHPPHYSICALEXAM_GEN_ALL_CORE
Vital Signs Last 24 Hrs  T(C): 36.5 (24 Jan 2025 15:24), Max: 36.5 (24 Jan 2025 15:24)  T(F): 97.7 (24 Jan 2025 15:24), Max: 97.7 (24 Jan 2025 15:24)  HR: 72 (24 Jan 2025 16:41) (67 - 72)  BP: 120/56 (24 Jan 2025 16:41) (91/59 - 120/56)  BP(mean): --  RR: 18 (24 Jan 2025 15:24) (18 - 18)  SpO2: 94% (24 Jan 2025 15:24) (94% - 94%)    Parameters below as of 24 Jan 2025 15:24  Patient On (Oxygen Delivery Method): room air    GENERAL: NAD, laying in bed comfortably   HEAD:  Atraumatic, Normocephalic  EYES: EOMI, PERRLA, conjunctiva and sclera clear  ENMT: dry mucous membranes, poor dentition, No lesions  NECK: Supple, normal appearance, No JVD; Normal thyroid; Trachea midline  NERVOUS SYSTEM:  Alert & Oriented X3,  Motor Strength 5/5 B/L upper and lower extremities, sensation intact  CHEST/LUNG: Lungs clear to auscultation bilaterally, No rales, rhonchi, wheezing   HEART: Regular rate and rhythm; No murmurs, rubs, or gallops  ABDOMEN: Soft, Nontender, Nondistended; Bowel sounds present  EXTREMITIES:  2+ Peripheral Pulses, no LE  edema  SKIN: dry, No rashes or lesions;  Good capillary refill

## 2025-01-24 NOTE — ED PROVIDER NOTE - PHYSICAL EXAMINATION
Exam:  General: Patient well appearing, hypotensive in triage vital signs within normal limits  HEENT: airway patent with moist mucous membranes  Cardiac: RRR S1/S2 with strong peripheral pulses  Respiratory: lungs clear without respiratory distress  GI: abdomen soft, non tender, non distended  Neuro: no gross neurologic deficits  Skin: warm, well perfused  Psych: normal mood and affect

## 2025-01-24 NOTE — ED ADULT NURSE NOTE - NSFALLRISKINTERV_ED_ALL_ED

## 2025-01-24 NOTE — H&P ADULT - PROBLEM SELECTOR PLAN 2
Na 123, Serum Osm 262  Hypotonic Hypovolemia  Osm <280, classified based on volume status     Low BP, Likely secondary to poor oral intake,  s/p 1L NS     F/U Urine Na   BMP Q6  F/U TSH  Orthostatics Na 123, Serum Osm 262  Hypotonic Hypovolemia  Osm <280, classified based on volume status     Low BP, Likely secondary to poor oral intake,  s/p 1L NS     - F/U Urine Na   - F/U BMP AT 8PM, then BMP Q6  - F/U TSH  - Orthostatics Na 123, Serum Osm 262  Hypotonic Hypovolemia  Osm <280, classified based on volume status     Low BP, Likely secondary to poor oral intake,  s/p 1L NS     - F/U Urine Na   - F/U BMP AT 8PM, then BMP Q6  - F/U TSH  - F/U cortisol   - Orthostatics

## 2025-01-24 NOTE — CHART NOTE - NSCHARTNOTEFT_GEN_A_CORE
EVENT: Sodium: 129 mmol/L (01.24.25 @ 21:25)  Sodium: 123 mmol/L (01.24.25 @ 16:00)    BRIEF HPI: EVENT: Sodium: 129 mmol/L (01.24.25 @ 21:25)  Sodium: 123 mmol/L (01.24.25 @ 16:00)    BRIEF HPI: 75 y/o hx of COPD ( current smoker), Peripheral Neuropathy, HTN present w/ chief complain of syncopal episode At ED, VS notable for hypotension 91/51, / Na of 123 Serum Osm 262, UA negative for Infx, some proteinuria.  s/p 1L NS Admitted to medicine for the evaluation and management of Hyponatremia.         OBJECTIVE:  Vital Signs Last 24 Hrs  T(C): 36.5 (24 Jan 2025 22:25), Max: 36.5 (24 Jan 2025 15:24)  T(F): 97.7 (24 Jan 2025 22:25), Max: 97.7 (24 Jan 2025 15:24)  HR: 68 (24 Jan 2025 22:25) (67 - 75)  BP: 122/64 (24 Jan 2025 22:25) (91/59 - 122/66)  BP(mean): 83 (24 Jan 2025 22:25) (83 - 83)  RR: 18 (24 Jan 2025 22:25) (18 - 18)  SpO2: 95% (24 Jan 2025 22:25) (94% - 97%)    Parameters below as of 24 Jan 2025 22:25  Patient On (Oxygen Delivery Method): room air        FOCUSED PHYSICAL EXAM:    LABS:                        12.2   6.80  )-----------( 277      ( 24 Jan 2025 16:00 )             35.4     01-24    129[L]  |  96  |  19[H]  ----------------------------<  99  3.8   |  23  |  0.78    Ca    9.1      24 Jan 2025 21:25  Mg     1.9     01-24    TPro  7.3  /  Alb  3.5  /  TBili  0.7  /  DBili  x   /  AST  17  /  ALT  13  /  AlkPhos  141[H]  01-24      EKG:   IMGAGING:    ASSESSMENT:  HPI:  75 y/o hx of COPD ( current smoker), Peripheral Neuropathy, HTN present w/ chief complain of syncopal episode for two minutes, Pt was at her sister house accompanied by , recalls that she began to feel lightheaded when her sister began to ask her if she was feeling ok, she sat down at the chair and does not recall LOC however,  witnesses (  and Sister) reports that patient LOC lasted about two minutes. No abnormal movements were noted, urine or fecal incontinence. After two minutes, patient recovered consciousness w/o post-ictal symptoms. Patient denies any prodromal symptoms except for lightheadedness and blurry vision. Pt reports that she took her BP medication that morning, and that she has not had anything to eat since the day before. Patient admits to poor oral intake, with diet consisting of small bites, lots of tea and crackers.  Patient reports having syncopal episodes when she was younger but contributed to just being easily fainted. Patient denies fevers, chills, chest pain, sob, abdominal pain, nausea/vomiting, diarrhea or constipation. Pt endorsed urinary frequency and nocturia, but denies dysuria.     At ED, VS notable for hypotension 91/51  CMP w/ Na of 123 Serum Osm 262, Glucose 103  UA negative for Infx, some protenuria.   s/p 1L NS      (24 Jan 2025 19:40)      PLAN:   trend Na q6hrs, avoid correcting more than 6mEq in 24hr, goal by 6pm tomorrow no more than 129     FOLLOW UP / RESULT: AM labs EVENT: Sodium: 129 mmol/L (01.24.25 @ 21:25)  Sodium: 123 mmol/L (01.24.25 @ 16:00)    BRIEF HPI: 73 y/o hx of COPD ( current smoker), Peripheral Neuropathy, HTN present w/ chief complain of syncopal episode At ED, VS notable for hypotension 91/51, / Na of 123 Serum Osm 262, UA negative for infection, some proteinuria.  S/p 1L NS Admitted to medicine for the evaluation and management of Hyponatremia.     OBJECTIVE:  Vital Signs Last 24 Hrs  T(C): 36.5 (24 Jan 2025 22:25), Max: 36.5 (24 Jan 2025 15:24)  T(F): 97.7 (24 Jan 2025 22:25), Max: 97.7 (24 Jan 2025 15:24)  HR: 68 (24 Jan 2025 22:25) (67 - 75)  BP: 122/64 (24 Jan 2025 22:25) (91/59 - 122/66)  BP(mean): 83 (24 Jan 2025 22:25) (83 - 83)  RR: 18 (24 Jan 2025 22:25) (18 - 18)  SpO2: 95% (24 Jan 2025 22:25) (94% - 97%)    Parameters below as of 24 Jan 2025 22:25  Patient On (Oxygen Delivery Method): room air    FOCUSED PHYSICAL EXAM:  GEN: Pleasant elderly female in bed  NEURO: Alert, oriented X 4  RESP: Even, unlabored    LABS:                        12.2   6.80  )-----------( 277      ( 24 Jan 2025 16:00 )             35.4     01-24    129[L]  |  96  |  19[H]  ----------------------------<  99  3.8   |  23  |  0.78    Ca    9.1      24 Jan 2025 21:25  Mg     1.9     01-24    TPro  7.3  /  Alb  3.5  /  TBili  0.7  /  DBili  x   /  AST  17  /  ALT  13  /  Alk Phos  141[H]  01-24    PROBLEM: Hyponatremia probably due to poor oral intake  PLAN:   Trend Na q6hrs, avoid correcting more than 6mEq in 24hr, goal by 6pm 1/25 no more than 129     FOLLOW UP / RESULT: AM labs EVENT: Sodium: 129 mmol/L (01.24.25 @ 21:25)  Sodium: 123 mmol/L (01.24.25 @ 16:00)    BRIEF HPI: 75 y/o hx of COPD ( current smoker), Peripheral Neuropathy, HTN present w/ chief complain of syncopal episode At ED, VS notable for hypotension 91/51, / Na of 123 Serum Osm 262, UA negative for infection, some proteinuria.  S/p 1L NS Admitted to medicine for the evaluation and management of Hyponatremia.     OBJECTIVE:  Vital Signs Last 24 Hrs  T(C): 36.5 (24 Jan 2025 22:25), Max: 36.5 (24 Jan 2025 15:24)  T(F): 97.7 (24 Jan 2025 22:25), Max: 97.7 (24 Jan 2025 15:24)  HR: 68 (24 Jan 2025 22:25) (67 - 75)  BP: 122/64 (24 Jan 2025 22:25) (91/59 - 122/66)  BP(mean): 83 (24 Jan 2025 22:25) (83 - 83)  RR: 18 (24 Jan 2025 22:25) (18 - 18)  SpO2: 95% (24 Jan 2025 22:25) (94% - 97%)    Parameters below as of 24 Jan 2025 22:25  Patient On (Oxygen Delivery Method): room air    FOCUSED PHYSICAL EXAM:  GEN: Pleasant elderly female in bed  NEURO: Alert, oriented X 4  RESP: Even, unlabored  CV: S1 S2 regular    LABS:                        12.2   6.80  )-----------( 277      ( 24 Jan 2025 16:00 )             35.4     01-24    129[L]  |  96  |  19[H]  ----------------------------<  99  3.8   |  23  |  0.78    Ca    9.1      24 Jan 2025 21:25  Mg     1.9     01-24    TPro  7.3  /  Alb  3.5  /  TBili  0.7  /  DBili  x   /  AST  17  /  ALT  13  /  Alk Phos  141[H]  01-24    PROBLEM: Hyponatremia probably due to poor oral intake  PLAN:   Trend Na q6hrs, avoid correcting more than 6mEq in 24hr, goal by 6pm 1/25 no more than 129 mmol/L  Dextrose 5%. 1000 milliliter(s) (75 mL/Hr) IV Continuous X 24 hrs to prevent over correction    FOLLOW UP / RESULT: AM labs EVENT: Sodium: 129 mmol/L (01.24.25 @ 21:25)  Sodium: 123 mmol/L (01.24.25 @ 16:00)    BRIEF HPI: 75 y/o hx of COPD ( current smoker), Peripheral Neuropathy, HTN present w/ chief complain of syncopal episode At ED, VS notable for hypotension 91/51, / Na of 123 Serum Osm 262, UA negative for infection, some proteinuria.  S/p 1L NS Admitted to medicine for the evaluation and management of Hyponatremia.     OBJECTIVE:  Vital Signs Last 24 Hrs  T(C): 36.5 (24 Jan 2025 22:25), Max: 36.5 (24 Jan 2025 15:24)  T(F): 97.7 (24 Jan 2025 22:25), Max: 97.7 (24 Jan 2025 15:24)  HR: 68 (24 Jan 2025 22:25) (67 - 75)  BP: 122/64 (24 Jan 2025 22:25) (91/59 - 122/66)  BP(mean): 83 (24 Jan 2025 22:25) (83 - 83)  RR: 18 (24 Jan 2025 22:25) (18 - 18)  SpO2: 95% (24 Jan 2025 22:25) (94% - 97%)    Parameters below as of 24 Jan 2025 22:25  Patient On (Oxygen Delivery Method): room air    FOCUSED PHYSICAL EXAM:  GEN: Pleasant elderly female in bed  NEURO: Alert, oriented X 4  RESP: Even, unlabored, lungs clear, symmetrical  CV: S1 S2 regular    LABS:                        12.2   6.80  )-----------( 277      ( 24 Jan 2025 16:00 )             35.4     01-24    129[L]  |  96  |  19[H]  ----------------------------<  99  3.8   |  23  |  0.78    Ca    9.1      24 Jan 2025 21:25  Mg     1.9     01-24    TPro  7.3  /  Alb  3.5  /  TBili  0.7  /  DBili  x   /  AST  17  /  ALT  13  /  Alk Phos  141[H]  01-24  ACC: [id number] EXAM:  XR CHEST AP OR PA 1V       IMAGING  PROCEDURE DATE:  01/24/2025    INTERPRETATION:  TECHNIQUE: Single portable view of the chest.  COMPARISON:  9/13/2023  CLINICAL HISTORY: Chest Pain  FINDINGS:  Single frontal view of the chest demonstrates the lungs to be clear. The cardiomediastinal silhouette is unremarkable. No acute osseous abnormalities. Overlying EKG leads and wires are noted. Osteopenia. Multiple chronic bilateral rib fractures.  IMPRESSION: No acute cardiopulmonary disease process.    PROBLEM: Hypotonic Hypovolemia  probably due to poor oral intake  PLAN:   Trend Na q6hrs, avoid correcting more than 6mEq in 24hr, goal by 6pm 1/25 no more than 129 mmol/L  Dextrose 5%. 1000 milliliter(s) (75 mL/Hr) IV Continuous X 24 hrs to prevent over correction    FOLLOW UP / RESULT: AM labs

## 2025-01-24 NOTE — H&P ADULT - PROBLEM SELECTOR PLAN 1
p/w syncope   EKG w/ normal findings  no chest pain, palpitations  Low suspicion for cardiac in nature     - Obtain an Echo p/w syncope   EKG w/ normal findings  no chest pain, palpitations  Low suspicion for cardiac in nature   No hypoglycemia on presentation but Glucose around 90    - Obtain an Echo p/w syncope   EKG w/ normal findings  no chest pain, palpitations  Low suspicion for cardiac in nature   No hypoglycemia on presentation but Glucose around 90    - Obtain an Echo  - PT eval p/w syncope   EKG w/ normal findings  no chest pain, palpitations  Low suspicion for cardiac in nature   No hypoglycemia on presentation but Glucose around 90    - Obtain an Echo  - PT eval  - Orthostatics

## 2025-01-24 NOTE — H&P ADULT - ASSESSMENT
75 y/o hx of COPD ( current smoker), Peripheral Neuropathy, HTN present w/ chief complain of syncopal episode At ED, VS notable for hypotension 91/51, / Na of 123 Serum Osm 262  UA negative for Infx, some protenuria.  s/p 1L NS Admitted to medicine for the evaluation and management of Hyponatremia.  75 y/o hx of COPD ( current smoker), Peripheral Neuropathy, HTN present w/ chief complain of syncopal episode At ED, VS notable for hypotension 91/51, / Na of 123 Serum Osm 262  UA negative for Infx, some protenuria.  s/p 1L NS Admitted to medicine for the evaluation and management of Hyponatremia.       NEEDS MED REC, PT UNABLE TO RECALL MEDICATIONS, PHARMACY CLOSE.  73 y/o hx of COPD ( current smoker), Peripheral Neuropathy, HTN present w/ chief complain of syncopal episode At ED, VS notable for hypotension 91/51, / Na of 123 Serum Osm 262, UA negative for Infx, some protenuria.  s/p 1L NS Admitted to medicine for the evaluation and management of Hyponatremia.       NEEDS MED REC, PT UNABLE TO RECALL MEDICATIONS, PHARMACY CLOSE.

## 2025-01-24 NOTE — ED PROVIDER NOTE - CLINICAL SUMMARY MEDICAL DECISION MAKING FREE TEXT BOX
Patient presenting with syncopal episode now resolved.  Does have risk factors for cardiac disease but could just be due to blood pressure medications plus alcohol plus lack of food.  Currently nondiagnostic exam aside from low blood pressure in triage.  Will obtain screening labs and EKG along with troponin to screen for cardiac episode.  Will treat with IV fluids and feed.  Disposition to be determined based off of workup, vital signs and presence of ongoing symptoms.

## 2025-01-24 NOTE — H&P ADULT - ATTENDING COMMENTS
73yo F with pmh of HTN, currently daily smoker, polyneuropathy, who presented with a syncopal episode. She notes she was visiting her sister’s house but did not eat anything as of yesterday 3pm. She notes that she felt her vision was getting blurry, and then passed out for 2 minutes per . Then she came to, did not have any headstrike or incontinence. Denies any recent sick contacts or viral illness. Has had some urinary frequency recently, no dysuria. She notes she just had a cup of tea in the morning and had taken her BP medications. She notes she often also drinks multiple cups of tea daily. She notes that in the past several years ago, she had a few episodes of passing out but none recently. Currently denies any chest pain, fever, chills, dizziness     Labs, vitals reviewed. BP noted in low 90s. CBC wnl. UA noted negative. Na noted 123, serum osm noted 262 hypoosmotic, ur osm 421 high urine osmolarity, u na 70 high indicating likely sodium wasting, water retention. In ED already received 1L NS bolus   PE – elderly female, NAD, sitting up comfortable, NC/AT, rrr, lungs cta b/l, abd soft ntnd, extremities with no edema noted        A/P   #Syncopal episode   #Hypo-osmolar Hyponatremia   #HTN   #Tobacco use disorder   #Polyneuropathy    -trend Na q6hrs, avoid correcting more than 6mEq in 24hr, goal by 6pm tomorrow no more than 129   -obtain orthostatics   -check tsh, am cortisol   -home meds include lyrica and BP med patient does not recall   -hypoosmolar hyponatremia can be due to possible thiazide drug, will need med rec to r/o   -possible SIADH, would fluid restrict for now   -hold home BPmeds for now given low normal pressures   -can check echo given hx of prior syncopal episodes   -nicotine patch for tobacco dependence   -fall precautions   -oobtc as tolerated   -dvt ppx

## 2025-01-24 NOTE — ED PROVIDER NOTE - PROGRESS NOTE DETAILS
Patient found to be significantly hyponatremic possibly leading to syncopal episode.  Will add urine lites and serum osmolality to workup.  Will require admission for correction of sodium

## 2025-01-24 NOTE — PATIENT PROFILE ADULT - FALL HARM RISK - RISK INTERVENTIONS
Assistance OOB with selected safe patient handling equipment/Communicate Fall Risk and Risk Factors to all staff, patient, and family/Reinforce activity limits and safety measures with patient and family/Visual Cue: Yellow wristband/Bed in lowest position, wheels locked, appropriate side rails in place/Call bell, personal items and telephone in reach/Instruct patient to call for assistance before getting out of bed or chair/Non-slip footwear when patient is out of bed/[place] to call system/Physically safe environment - no spills, clutter or unnecessary equipment/Purposeful Proactive Rounding/Room/bathroom lighting operational, light cord in reach

## 2025-01-24 NOTE — ED ADULT NURSE NOTE - OBJECTIVE STATEMENT
Pt presents to ED c/o syncope today. States that it was witnessed by / Patient denies any pain, dizziness

## 2025-01-25 ENCOUNTER — RESULT REVIEW (OUTPATIENT)
Age: 75
End: 2025-01-25

## 2025-01-25 ENCOUNTER — TRANSCRIPTION ENCOUNTER (OUTPATIENT)
Age: 75
End: 2025-01-25

## 2025-01-25 LAB
ANION GAP SERPL CALC-SCNC: 10 MMOL/L — SIGNIFICANT CHANGE UP (ref 5–17)
ANION GAP SERPL CALC-SCNC: 9 MMOL/L — SIGNIFICANT CHANGE UP (ref 5–17)
BUN SERPL-MCNC: 16 MG/DL — SIGNIFICANT CHANGE UP (ref 7–18)
BUN SERPL-MCNC: 18 MG/DL — SIGNIFICANT CHANGE UP (ref 7–18)
CALCIUM SERPL-MCNC: 8.9 MG/DL — SIGNIFICANT CHANGE UP (ref 8.4–10.5)
CALCIUM SERPL-MCNC: 9.2 MG/DL — SIGNIFICANT CHANGE UP (ref 8.4–10.5)
CHLORIDE SERPL-SCNC: 93 MMOL/L — LOW (ref 96–108)
CHLORIDE SERPL-SCNC: 96 MMOL/L — SIGNIFICANT CHANGE UP (ref 96–108)
CO2 SERPL-SCNC: 24 MMOL/L — SIGNIFICANT CHANGE UP (ref 22–31)
CO2 SERPL-SCNC: 27 MMOL/L — SIGNIFICANT CHANGE UP (ref 22–31)
CREAT SERPL-MCNC: 0.71 MG/DL — SIGNIFICANT CHANGE UP (ref 0.5–1.3)
CREAT SERPL-MCNC: 0.9 MG/DL — SIGNIFICANT CHANGE UP (ref 0.5–1.3)
EGFR: 67 ML/MIN/1.73M2 — SIGNIFICANT CHANGE UP
EGFR: 89 ML/MIN/1.73M2 — SIGNIFICANT CHANGE UP
GLUCOSE SERPL-MCNC: 137 MG/DL — HIGH (ref 70–99)
GLUCOSE SERPL-MCNC: 99 MG/DL — SIGNIFICANT CHANGE UP (ref 70–99)
HCT VFR BLD CALC: 32.4 % — LOW (ref 34.5–45)
HGB BLD-MCNC: 11 G/DL — LOW (ref 11.5–15.5)
MAGNESIUM SERPL-MCNC: 1.8 MG/DL — SIGNIFICANT CHANGE UP (ref 1.6–2.6)
MCHC RBC-ENTMCNC: 27.5 PG — SIGNIFICANT CHANGE UP (ref 27–34)
MCHC RBC-ENTMCNC: 34 G/DL — SIGNIFICANT CHANGE UP (ref 32–36)
MCV RBC AUTO: 81 FL — SIGNIFICANT CHANGE UP (ref 80–100)
NRBC # BLD: 0 /100 WBCS — SIGNIFICANT CHANGE UP (ref 0–0)
NRBC BLD-RTO: 0 /100 WBCS — SIGNIFICANT CHANGE UP (ref 0–0)
PHOSPHATE SERPL-MCNC: 3.2 MG/DL — SIGNIFICANT CHANGE UP (ref 2.5–4.5)
PLATELET # BLD AUTO: 266 K/UL — SIGNIFICANT CHANGE UP (ref 150–400)
POTASSIUM SERPL-MCNC: 3.4 MMOL/L — LOW (ref 3.5–5.3)
POTASSIUM SERPL-MCNC: 3.9 MMOL/L — SIGNIFICANT CHANGE UP (ref 3.5–5.3)
POTASSIUM SERPL-SCNC: 3.4 MMOL/L — LOW (ref 3.5–5.3)
POTASSIUM SERPL-SCNC: 3.9 MMOL/L — SIGNIFICANT CHANGE UP (ref 3.5–5.3)
RBC # BLD: 4 M/UL — SIGNIFICANT CHANGE UP (ref 3.8–5.2)
RBC # FLD: 13.1 % — SIGNIFICANT CHANGE UP (ref 10.3–14.5)
SODIUM SERPL-SCNC: 129 MMOL/L — LOW (ref 135–145)
SODIUM SERPL-SCNC: 130 MMOL/L — LOW (ref 135–145)
UUN UR-MCNC: 407 MG/DL — SIGNIFICANT CHANGE UP
WBC # BLD: 4.68 K/UL — SIGNIFICANT CHANGE UP (ref 3.8–10.5)
WBC # FLD AUTO: 4.68 K/UL — SIGNIFICANT CHANGE UP (ref 3.8–10.5)

## 2025-01-25 RX ORDER — SODIUM CHLORIDE 9 G/ML
1000 INJECTION, SOLUTION INTRAVENOUS
Refills: 0 | Status: DISCONTINUED | OUTPATIENT
Start: 2025-01-25 | End: 2025-01-26

## 2025-01-25 RX ORDER — AMLODIPINE BESYLATE 5 MG
2.5 TABLET ORAL DAILY
Refills: 0 | Status: DISCONTINUED | OUTPATIENT
Start: 2025-01-25 | End: 2025-01-27

## 2025-01-25 RX ORDER — PANTOPRAZOLE 20 MG/1
40 TABLET, DELAYED RELEASE ORAL
Refills: 0 | Status: DISCONTINUED | OUTPATIENT
Start: 2025-01-25 | End: 2025-01-29

## 2025-01-25 RX ORDER — NICOTINE POLACRILEX 4 MG/1
1 LOZENGE ORAL EVERY 24 HOURS
Refills: 0 | Status: DISCONTINUED | OUTPATIENT
Start: 2025-01-25 | End: 2025-01-29

## 2025-01-25 RX ADMIN — SODIUM CHLORIDE 75 MILLILITER(S): 9 INJECTION, SOLUTION INTRAVENOUS at 21:56

## 2025-01-25 RX ADMIN — ENOXAPARIN SODIUM 40 MILLIGRAM(S): 100 INJECTION SUBCUTANEOUS at 21:55

## 2025-01-25 RX ADMIN — PREGABALIN CAPSULES, CV 150 MILLIGRAM(S): 225 CAPSULE ORAL at 21:55

## 2025-01-25 RX ADMIN — PANTOPRAZOLE 40 MILLIGRAM(S): 20 TABLET, DELAYED RELEASE ORAL at 08:47

## 2025-01-25 RX ADMIN — SODIUM CHLORIDE 75 MILLILITER(S): 9 INJECTION, SOLUTION INTRAVENOUS at 08:19

## 2025-01-25 RX ADMIN — NICOTINE POLACRILEX 1 PATCH: 4 LOZENGE ORAL at 16:36

## 2025-01-25 RX ADMIN — SODIUM CHLORIDE 75 MILLILITER(S): 9 INJECTION, SOLUTION INTRAVENOUS at 00:14

## 2025-01-25 RX ADMIN — NICOTINE POLACRILEX 1 PATCH: 4 LOZENGE ORAL at 19:55

## 2025-01-25 RX ADMIN — PREGABALIN CAPSULES, CV 150 MILLIGRAM(S): 225 CAPSULE ORAL at 05:25

## 2025-01-25 RX ADMIN — PREGABALIN CAPSULES, CV 150 MILLIGRAM(S): 225 CAPSULE ORAL at 14:29

## 2025-01-25 NOTE — DISCHARGE NOTE PROVIDER - NSDCCPCAREPLAN_GEN_ALL_CORE_FT
PRINCIPAL DISCHARGE DIAGNOSIS  Diagnosis: Hyponatremia  Assessment and Plan of Treatment: What is hyponatremia? Hyponatremia occurs when the amount of sodium (salt) in your blood is lower than normal. Sodium is an electrolyte (mineral) that helps your muscles, heart, and digestive system work properly. It helps control blood pressure and fluid balance.  What causes hyponatremia? Hyponatremia happens when too much sodium leaves your body, or when more water than sodium stays in your blood. Any of the following conditions can lead to hyponatremia:  A diet that is low in sodium  Drinking too much water or receiving too much fluid through an IV  Intense and prolonged exercise that causes excessive sweating  Medical conditions, such as Kobe disease, kidney disease, congestive heart failure, liver cirrhosis, or cancer  Medicines, such as diuretics, antidepressants, pain medicines, or illegal drugs such as ecstasy  Dehydration  How is hyponatremia treated? Treatment depends on the cause of your hyponatremia and how severe it is. Healthcare providers may limit the amount of liquids you drink if you are retaining water. A salt solution may be given through an IV to increase the amount of sodium in your blood. Medicines may also be given to help get rid of extra fluid in your body. You may urinate more often while taking these medicines.  When should I contact my healthcare provider?  You have muscle cramps or twitching.  You feel very weak or tired.  You have nausea or are vomiting.  You have questions or concerns about your condition or care.  When should I seek immediate care or call 911?  You have a seizure.  You have an irregular heartbeat.  You have trouble breathing.  You cannot move your arms and legs.  You are confused or cannot think clearly.        SECONDARY DISCHARGE DIAGNOSES  Diagnosis: Syncope  Assessment and Plan of Treatment: HOME CARE INSTRUCTIONS  Have someone stay with you until you feel stable.  Do not drive, operate machinery, or play sports until your caregiver says it is okay.  Keep all follow-up appointments as directed by your caregiver.   Lie down right away if you start feeling like you might faint. Breathe deeply and steadily. Wait until all the symptoms have passed.Drink enough fluids to keep your urine clear or pale yellow.  If you are taking blood pressure or heart medicine, get up slowly, taking several minutes to sit and then stand. This can reduce dizziness.  SEEK IMMEDIATE MEDICAL CARE IF:  You have a severe headache.  You have unusual pain in the chest, abdomen, or back.  You are bleeding from the mouth or rectum, or you have black or tarry stool.  You have an irregular or very fast heartbeat.  You have pain with breathing.  You have repeated fainting or seizure-like jerking during an episode.  You faint when sitting or lying down.  You have confusion.  You have difficulty walking.  You have severe weakness.  You have vision problems.  If you fainted, call your local emergency services.  Do not drive yourself to the hospital      Diagnosis: HTN (hypertension)  Assessment and Plan of Treatment: You are being treated for high blood pressure.  Continue taking your medication as prescribed to help prevent or minimize your risk of end organ damage.  Follow up with your doctor for routine blood pressure evaluation and medication regimen.  Report any symptoms of headaces with nausea or vomiting, visual changes, heart palpitation, chest pain or shortness.      Diagnosis: HLD (hyperlipidemia)  Assessment and Plan of Treatment: Please continue taking your cholesterol medication as prescribed and follow up with your doctor for routine blood work and including evaluation of your liver function while taking medication for your cholesterol.  Report any changes in the color of your skin such as yellowing of your skin, changes in the color of your urine, itching skin, cramps to your lower legs.       PRINCIPAL DISCHARGE DIAGNOSIS  Diagnosis: Hyponatremia  Assessment and Plan of Treatment: What is hyponatremia? Hyponatremia occurs when the amount of sodium (salt) in your blood is lower than normal. Sodium is an electrolyte (mineral) that helps your muscles, heart, and digestive system work properly. It helps control blood pressure and fluid balance.  What causes hyponatremia? Hyponatremia happens when too much sodium leaves your body, or when more water than sodium stays in your blood. Any of the following conditions can lead to hyponatremia:  A diet that is low in sodium  Drinking too much water or receiving too much fluid through an IV  Intense and prolonged exercise that causes excessive sweating  Medical conditions, such as Kobe disease, kidney disease, congestive heart failure, liver cirrhosis, or cancer  Medicines, such as diuretics, antidepressants, pain medicines, or illegal drugs such as ecstasy  Dehydration  How is hyponatremia treated? Treatment depends on the cause of your hyponatremia and how severe it is. Healthcare providers may limit the amount of liquids you drink if you are retaining water. A salt solution may be given through an IV to increase the amount of sodium in your blood. Medicines may also be given to help get rid of extra fluid in your body. You may urinate more often while taking these medicines.  When should I contact my healthcare provider?  You have muscle cramps or twitching.  You feel very weak or tired.  You have nausea or are vomiting.  You have questions or concerns about your condition or care.  When should I seek immediate care or call 911?  You have a seizure.  You have an irregular heartbeat.  You have trouble breathing.  You cannot move your arms and legs.  You are confused or cannot think clearly.        SECONDARY DISCHARGE DIAGNOSES  Diagnosis: HTN (hypertension)  Assessment and Plan of Treatment: You are being treated for high blood pressure.  Continue taking your medication as prescribed to help prevent or minimize your risk of end organ damage.  Follow up with your doctor for routine blood pressure evaluation and medication regimen.  Report any symptoms of headaces with nausea or vomiting, visual changes, heart palpitation, chest pain or shortness.     Your hyponatremia is likely related to a medication you were taking to control you blood pressure.  It is imperative that you follow up with your primary care provider regularly to ensure your new regimen is adequately controlling your blood pressure.    Diagnosis: Syncope  Assessment and Plan of Treatment: HOME CARE INSTRUCTIONS  Have someone stay with you until you feel stable.  Do not drive, operate machinery, or play sports until your caregiver says it is okay.  Keep all follow-up appointments as directed by your caregiver.   Lie down right away if you start feeling like you might faint. Breathe deeply and steadily. Wait until all the symptoms have passed.Drink enough fluids to keep your urine clear or pale yellow.  If you are taking blood pressure or heart medicine, get up slowly, taking several minutes to sit and then stand. This can reduce dizziness.  SEEK IMMEDIATE MEDICAL CARE IF:  You have a severe headache.  You have unusual pain in the chest, abdomen, or back.  You are bleeding from the mouth or rectum, or you have black or tarry stool.  You have an irregular or very fast heartbeat.  You have pain with breathing.  You have repeated fainting or seizure-like jerking during an episode.  You faint when sitting or lying down.  You have confusion.  You have difficulty walking.  You have severe weakness.  You have vision problems.  If you fainted, call your local emergency services.  Do not drive yourself to the hospital      Diagnosis: HLD (hyperlipidemia)  Assessment and Plan of Treatment: Please continue taking your cholesterol medication as prescribed and follow up with your doctor for routine blood work and including evaluation of your liver function while taking medication for your cholesterol.  Report any changes in the color of your skin such as yellowing of your skin, changes in the color of your urine, itching skin, cramps to your lower legs.

## 2025-01-25 NOTE — PROGRESS NOTE ADULT - SUBJECTIVE AND OBJECTIVE BOX
INTERVAL HPI/OVERNIGHT EVENTS:  Patient seen,doing better,no acute issues  VITAL SIGNS:  T(F): 98.6 (01-25-25 @ 20:15)  HR: 78 (01-25-25 @ 20:15)  BP: 122/73 (01-25-25 @ 20:15)  RR: 18 (01-25-25 @ 20:15)  SpO2: 95% (01-25-25 @ 20:15)  Wt(kg): --    PHYSICAL EXAM:  awake  Constitutional:  Eyes:  ENMT:perrla  Neck:  Respiratory:clear  Cardiovascular:s1s2,m-none  Gastrointestinal:soft,bs pos  Extremities:  Vascular:  Neurological:no focal deficit  Musculoskeletal:    MEDICATIONS  (STANDING):  amLODIPine   Tablet 2.5 milliGRAM(s) Oral daily  dextrose 5%. 1000 milliLiter(s) (75 mL/Hr) IV Continuous <Continuous>  enoxaparin Injectable 40 milliGRAM(s) SubCutaneous every 24 hours  lisinopril 20 milliGRAM(s) Oral daily  nicotine -  14 mG/24Hr(s) Patch 1 Patch Transdermal every 24 hours  pantoprazole    Tablet 40 milliGRAM(s) Oral before breakfast  pregabalin 150 milliGRAM(s) Oral three times a day    MEDICATIONS  (PRN):  acetaminophen     Tablet .. 650 milliGRAM(s) Oral every 6 hours PRN Temp greater or equal to 38C (100.4F), Mild Pain (1 - 3)  aluminum hydroxide/magnesium hydroxide/simethicone Suspension 30 milliLiter(s) Oral every 4 hours PRN Dyspepsia  melatonin 3 milliGRAM(s) Oral at bedtime PRN Insomnia  ondansetron Injectable 4 milliGRAM(s) IV Push every 8 hours PRN Nausea and/or Vomiting      Allergies    sulfa drugs (Unknown)    Intolerances        LABS:                        11.0   4.68  )-----------( 266      ( 25 Jan 2025 06:40 )             32.4     01-25    130[L]  |  93[L]  |  18  ----------------------------<  137[H]  3.9   |  27  |  0.90    Ca    8.9      25 Jan 2025 18:26  Phos  3.2     01-25  Mg     1.8     01-25    TPro  7.3  /  Alb  3.5  /  TBili  0.7  /  DBili  x   /  AST  17  /  ALT  13  /  AlkPhos  141[H]  01-24      Urinalysis Basic - ( 25 Jan 2025 18:26 )    Color: x / Appearance: x / SG: x / pH: x  Gluc: 137 mg/dL / Ketone: x  / Bili: x / Urobili: x   Blood: x / Protein: x / Nitrite: x   Leuk Esterase: x / RBC: x / WBC x   Sq Epi: x / Non Sq Epi: x / Bacteria: x        RADIOLOGY & ADDITIONAL TESTS:      · Assessment	  75 y/o hx of COPD ( current smoker), Peripheral Neuropathy, HTN present w/ chief complain of syncopal episode At ED, VS notable for hypotension 91/51, / Na of 123 Serum Osm 262, UA negative for Infx, some protenuria.  s/p 1L NS Admitted to medicine for the evaluation and management of Hyponatremia.       NEEDS MED REC, PT UNABLE TO RECALL MEDICATIONS, PHARMACY CLOSE.        Problem/Plan - 1:  ·  Problem: Syncope.   ·  Plan: p/w syncope   EKG w/ normal findings  no chest pain, palpitations  Low suspicion for cardiac in nature   No hypoglycemia on presentation but Glucose around 90    - Obtain an Echo  - PT eval  - Orthostatics.     Problem/Plan - 2:  ·  Problem: Hyponatremia.   ·  Plan: Na 123, Serum Osm 262  Hypotonic Hypovolemia  Osm <280, classified based on volume status     Low BP, Likely secondary to poor oral intake,  s/p 1L NS     - F/U Urine Na   - F/U BMP AT 8PM, then BMP Q6  - F/U TSH  - F/U cortisol   - Orthostatics.     Problem/Plan - 3:  ·  Problem: HTN (hypertension).   ·  Plan: Hold BP meds in the setting of hypotension    need med rec.     Problem/Plan - 4:  ·  Problem: HLD (hyperlipidemia).   ·  Plan: Needs med rec.     Problem/Plan - 5:  ·  Problem: H/O neuropathy.   ·  Plan: c/w Home medication Lyrica 150mg 3x/day.     Problem/Plan - 6:  ·  Problem: History of COPD.   ·  Plan: Not on home oxygen   Not on any exacerbations  denies any symptoms   No meds.     Problem/Plan - 7:  ·  Problem: Prophylactic measure.   ·  Plan: DVT: Lovenox.

## 2025-01-25 NOTE — PHYSICAL THERAPY INITIAL EVALUATION ADULT - PERTINENT HX OF CURRENT PROBLEM, REHAB EVAL
Reason for admission: Syncope,  Hyponatremia, Hypo-osmolality Reason for admission: Syncope,  Hyponatremia, Hypo-osmolality (Hypo-osmolar hyponatremia)

## 2025-01-25 NOTE — PHYSICAL THERAPY INITIAL EVALUATION ADULT - ADDITIONAL COMMENTS
Patient lives with spouse, in an apartment building with elevator. Patient independent without any assistive mobility device.

## 2025-01-25 NOTE — DISCHARGE NOTE PROVIDER - CARE PROVIDER_API CALL
Fredy Walker  Internal Medicine  [address] [address] [place] [address], [address] [address]  Phone: ()-  Fax: ()-  Follow Up Time:     Fredy Walker  Phone: [phone]  Fax: (   )    -  Follow Up Time:    Fredy Walker  Internal Medicine  [address] [place] [address], [address] [address]  Phone: ()-  Fax: ()-  Follow Up Time: 1 week    Fredy Walker  Phone: [phone]  Fax: (   )    -  Follow Up Time:

## 2025-01-25 NOTE — DISCHARGE NOTE PROVIDER - HOSPITAL COURSE
Pt is a 75 y/o F with hx of COPD (current smoker), Peripheral Neuropathy, HTN present w/ chief complain of syncopal episode for two minutes. Pt was at her sister house accompanied by , recalls that she began to feel lightheaded when her sister began to ask her if she was feeling ok, she sat down at the chair and does not recall LOC however,  witnesses (  and Sister) reports that patient LOC lasted about two minutes. No abnormal movements were noted, urine or fecal incontinence. After two minutes, patient recovered consciousness w/o post-ictal symptoms. Patient denies any prodromal symptoms except for lightheadedness and blurry vision. Pt reports that she took her BP medication that morning, and that she has not had anything to eat since the day before. Patient admits to poor oral intake, with diet consisting of small bites, lots of tea and crackers.  Patient reports having syncopal episodes when she was younger but contributed to just being easily fainted. Patient denies fevers, chills, chest pain, sob, abdominal pain, nausea/vomiting, diarrhea or constipation. Pt endorsed urinary frequency and nocturia, but denies dysuria.     In ED, VS notable for hypotension 91/51, CMP w/ Na of 123 Serum Osm 262, Glucose 103, UA negative for Infx, some proteinuria. She received 1L of NS and was admitted to Medicine for further evaluation and management of hyponatremia.     She was evaluated and no skilled PT is recommended. -  INCOMPLETE-  Pending Echo read     Pt is a 73 y/o F with hx of COPD (current smoker), Peripheral Neuropathy, HTN present w/ chief complain of syncopal episode for two minutes. Pt was at her sister house accompanied by , recalls that she began to feel lightheaded when her sister began to ask her if she was feeling ok, she sat down at the chair and does not recall LOC however,  witnesses (  and Sister) reports that patient LOC lasted about two minutes. No abnormal movements were noted, urine or fecal incontinence. After two minutes, patient recovered consciousness w/o post-ictal symptoms. Patient denies any prodromal symptoms except for lightheadedness and blurry vision. Pt reports that she took her BP medication that morning, and that she has not had anything to eat since the day before. Patient admits to poor oral intake, with diet consisting of small bites, lots of tea and crackers.  Patient reports having syncopal episodes when she was younger but contributed to just being easily fainted. Patient denies fevers, chills, chest pain, sob, abdominal pain, nausea/vomiting, diarrhea or constipation. Pt endorsed urinary frequency and nocturia, but denies dysuria.     In ED, VS notable for hypotension 91/51, CMP w/ Na of 123 Serum Osm 262, Glucose 103, UA negative for Infx, some proteinuria. She received 1L of NS and was admitted to Medicine for further evaluation and management of hyponatremia.     She was evaluated and no skilled PT is recommended.    #TTE W or WO Ultrasound Enhancing Agent CONCLUSIONS:  1. Left ventricular cavity is normal in size. Left ventricular wall thickness is normal. Left ventricular systolic function is normal with an ejection fraction of 64 % by Siddiqui's method of disks. There are no regional wall motion abnormalities seen.  2. Normal left ventricular diastolic function.  3. Normal right ventricular cavity size, with normal wall thickness, and normal right ventricular systolic function. Tricuspid annular plane systolic excursion (TAPSE) is 1.7 cm (normal >=1.7 cm). Tricuspid annular tissue Doppler S' is 13.0 cm/s (normal >10 cm/s).  4. Normal left and right atrial size.  5. Pulmonary artery systolic pressure could not be estimated.  6. Mild aortic regurgitation.  7. No pericardial effusion seen.  8. No prior echocardiogram is available for comparison.    Course complicated by hyponatremia likely secondary to Indapamide.  Treated with fluid restriction and salt tabs.    Discharge off of salt tabs and with Low fat diet.    Case discussed with attending.  Given patient's improved clinical status and current hemodynamic stability, the decision was made for discharge.

## 2025-01-25 NOTE — PHYSICAL THERAPY INITIAL EVALUATION ADULT - NSPTDISCHREC_GEN_A_CORE
Patient is at her previous level of function, independent in transfer and ambulation./No skilled PT needs

## 2025-01-25 NOTE — PHYSICAL THERAPY INITIAL EVALUATION ADULT - DIAGNOSIS, PT EVAL
Pt. present w/deficits in  Body Structures/Function including strength, balance, leading to deficits in performing bed mobility, transfer and ambulation endurance.

## 2025-01-25 NOTE — DISCHARGE NOTE PROVIDER - NSDCMRMEDTOKEN_GEN_ALL_CORE_FT
lisinopril 20 mg oral tablet: 1 tab(s) orally once a day  pregabalin 150 mg oral capsule: 1 cap(s) orally 3 times a day   acetaminophen 325 mg oral tablet: 2 tab(s) orally every 6 hours As needed Temp greater or equal to 38C (100.4F), Mild Pain (1 - 3)  lisinopril 10 mg oral tablet: 1 tab(s) orally once a day  Nicoderm C-Q Clear 14 mg/24 hr transdermal film, extended release: 1 patch transdermal once a day transdermal  omeprazole 20 mg oral delayed release capsule: 1 cap(s) orally once a day  pregabalin 150 mg oral capsule: 1 cap(s) orally 3 times a day

## 2025-01-25 NOTE — DISCHARGE NOTE PROVIDER - PROVIDER TOKENS
PROVIDER:[TOKEN:[[id number]]],FREE:[LAST:[Denise],FIRST:[Fredy],PHONE:[phone]:[(   )    -]] PROVIDER:[TOKEN:[[id number]],FOLLOWUP:[1 week]],FREE:[LAST:[Denise],FIRST:[Fredy],PHONE:[phone]],FAX:[(   )    -]]

## 2025-01-25 NOTE — PHYSICAL THERAPY INITIAL EVALUATION ADULT - GENERAL OBSERVATIONS, REHAB EVAL
Patient was seen for PT evaluation today. EMR, laboratory and radiology results reviewed. Pt received sitting at edge of bed, NAD

## 2025-01-25 NOTE — PHYSICAL THERAPY INITIAL EVALUATION ADULT - MUSCLE TONE ASSESSMENT, REHAB EVAL
DISPLAY PLAN FREE TEXT DISPLAY PLAN FREE TEXT DISPLAY PLAN FREE TEXT DISPLAY PLAN FREE TEXT DISPLAY PLAN FREE TEXT DISPLAY PLAN FREE TEXT DISPLAY PLAN FREE TEXT DISPLAY PLAN FREE TEXT DISPLAY PLAN FREE TEXT DISPLAY PLAN FREE TEXT DISPLAY PLAN FREE TEXT DISPLAY PLAN FREE TEXT DISPLAY PLAN FREE TEXT DISPLAY PLAN FREE TEXT DISPLAY PLAN FREE TEXT normal

## 2025-01-26 LAB
ANION GAP SERPL CALC-SCNC: 12 MMOL/L — SIGNIFICANT CHANGE UP (ref 5–17)
ANION GAP SERPL CALC-SCNC: 4 MMOL/L — LOW (ref 5–17)
ANION GAP SERPL CALC-SCNC: 6 MMOL/L — SIGNIFICANT CHANGE UP (ref 5–17)
ANION GAP SERPL CALC-SCNC: 8 MMOL/L — SIGNIFICANT CHANGE UP (ref 5–17)
BUN SERPL-MCNC: 15 MG/DL — SIGNIFICANT CHANGE UP (ref 7–18)
BUN SERPL-MCNC: 18 MG/DL — SIGNIFICANT CHANGE UP (ref 7–18)
BUN SERPL-MCNC: 18 MG/DL — SIGNIFICANT CHANGE UP (ref 7–18)
BUN SERPL-MCNC: 19 MG/DL — HIGH (ref 7–18)
CALCIUM SERPL-MCNC: 8.6 MG/DL — SIGNIFICANT CHANGE UP (ref 8.4–10.5)
CALCIUM SERPL-MCNC: 8.8 MG/DL — SIGNIFICANT CHANGE UP (ref 8.4–10.5)
CALCIUM SERPL-MCNC: 8.9 MG/DL — SIGNIFICANT CHANGE UP (ref 8.4–10.5)
CALCIUM SERPL-MCNC: 9.1 MG/DL — SIGNIFICANT CHANGE UP (ref 8.4–10.5)
CHLORIDE SERPL-SCNC: 92 MMOL/L — LOW (ref 96–108)
CHLORIDE SERPL-SCNC: 92 MMOL/L — LOW (ref 96–108)
CHLORIDE SERPL-SCNC: 94 MMOL/L — LOW (ref 96–108)
CHLORIDE SERPL-SCNC: 94 MMOL/L — LOW (ref 96–108)
CO2 SERPL-SCNC: 23 MMOL/L — SIGNIFICANT CHANGE UP (ref 22–31)
CO2 SERPL-SCNC: 27 MMOL/L — SIGNIFICANT CHANGE UP (ref 22–31)
CO2 SERPL-SCNC: 28 MMOL/L — SIGNIFICANT CHANGE UP (ref 22–31)
CO2 SERPL-SCNC: 28 MMOL/L — SIGNIFICANT CHANGE UP (ref 22–31)
CREAT SERPL-MCNC: 0.73 MG/DL — SIGNIFICANT CHANGE UP (ref 0.5–1.3)
CREAT SERPL-MCNC: 0.75 MG/DL — SIGNIFICANT CHANGE UP (ref 0.5–1.3)
CREAT SERPL-MCNC: 0.8 MG/DL — SIGNIFICANT CHANGE UP (ref 0.5–1.3)
CREAT SERPL-MCNC: 0.84 MG/DL — SIGNIFICANT CHANGE UP (ref 0.5–1.3)
EGFR: 72 ML/MIN/1.73M2 — SIGNIFICANT CHANGE UP
EGFR: 77 ML/MIN/1.73M2 — SIGNIFICANT CHANGE UP
EGFR: 83 ML/MIN/1.73M2 — SIGNIFICANT CHANGE UP
EGFR: 86 ML/MIN/1.73M2 — SIGNIFICANT CHANGE UP
GLUCOSE SERPL-MCNC: 101 MG/DL — HIGH (ref 70–99)
GLUCOSE SERPL-MCNC: 105 MG/DL — HIGH (ref 70–99)
GLUCOSE SERPL-MCNC: 112 MG/DL — HIGH (ref 70–99)
GLUCOSE SERPL-MCNC: 128 MG/DL — HIGH (ref 70–99)
HCT VFR BLD CALC: 33.5 % — LOW (ref 34.5–45)
HGB BLD-MCNC: 11.6 G/DL — SIGNIFICANT CHANGE UP (ref 11.5–15.5)
MAGNESIUM SERPL-MCNC: 1.8 MG/DL — SIGNIFICANT CHANGE UP (ref 1.6–2.6)
MCHC RBC-ENTMCNC: 28.4 PG — SIGNIFICANT CHANGE UP (ref 27–34)
MCHC RBC-ENTMCNC: 34.6 G/DL — SIGNIFICANT CHANGE UP (ref 32–36)
MCV RBC AUTO: 81.9 FL — SIGNIFICANT CHANGE UP (ref 80–100)
NRBC # BLD: 0 /100 WBCS — SIGNIFICANT CHANGE UP (ref 0–0)
NRBC BLD-RTO: 0 /100 WBCS — SIGNIFICANT CHANGE UP (ref 0–0)
OSMOLALITY SERPL: 267 MOSMOL/KG — LOW (ref 280–301)
OSMOLALITY UR: 449 MOS/KG — SIGNIFICANT CHANGE UP (ref 50–1200)
PHOSPHATE SERPL-MCNC: 3.5 MG/DL — SIGNIFICANT CHANGE UP (ref 2.5–4.5)
PLATELET # BLD AUTO: 261 K/UL — SIGNIFICANT CHANGE UP (ref 150–400)
POTASSIUM SERPL-MCNC: 3.8 MMOL/L — SIGNIFICANT CHANGE UP (ref 3.5–5.3)
POTASSIUM SERPL-MCNC: 3.9 MMOL/L — SIGNIFICANT CHANGE UP (ref 3.5–5.3)
POTASSIUM SERPL-MCNC: 4 MMOL/L — SIGNIFICANT CHANGE UP (ref 3.5–5.3)
POTASSIUM SERPL-MCNC: 4 MMOL/L — SIGNIFICANT CHANGE UP (ref 3.5–5.3)
POTASSIUM SERPL-SCNC: 3.8 MMOL/L — SIGNIFICANT CHANGE UP (ref 3.5–5.3)
POTASSIUM SERPL-SCNC: 3.9 MMOL/L — SIGNIFICANT CHANGE UP (ref 3.5–5.3)
POTASSIUM SERPL-SCNC: 4 MMOL/L — SIGNIFICANT CHANGE UP (ref 3.5–5.3)
POTASSIUM SERPL-SCNC: 4 MMOL/L — SIGNIFICANT CHANGE UP (ref 3.5–5.3)
RBC # BLD: 4.09 M/UL — SIGNIFICANT CHANGE UP (ref 3.8–5.2)
RBC # FLD: 13.2 % — SIGNIFICANT CHANGE UP (ref 10.3–14.5)
SODIUM SERPL-SCNC: 125 MMOL/L — LOW (ref 135–145)
SODIUM SERPL-SCNC: 126 MMOL/L — LOW (ref 135–145)
SODIUM SERPL-SCNC: 127 MMOL/L — LOW (ref 135–145)
SODIUM SERPL-SCNC: 130 MMOL/L — LOW (ref 135–145)
SODIUM UR-SCNC: 47 MMOL/L — SIGNIFICANT CHANGE UP
WBC # BLD: 5.55 K/UL — SIGNIFICANT CHANGE UP (ref 3.8–10.5)
WBC # FLD AUTO: 5.55 K/UL — SIGNIFICANT CHANGE UP (ref 3.8–10.5)

## 2025-01-26 RX ORDER — OMEPRAZOLE 20 MG/1
0 CAPSULE, DELAYED RELEASE ORAL
Refills: 0 | DISCHARGE

## 2025-01-26 RX ORDER — SODIUM CHLORIDE 9 G/ML
1000 INJECTION, SOLUTION INTRAVENOUS
Refills: 0 | Status: DISCONTINUED | OUTPATIENT
Start: 2025-01-26 | End: 2025-01-26

## 2025-01-26 RX ORDER — OMEPRAZOLE 20 MG/1
1 CAPSULE, DELAYED RELEASE ORAL
Refills: 0 | DISCHARGE

## 2025-01-26 RX ADMIN — NICOTINE POLACRILEX 1 PATCH: 4 LOZENGE ORAL at 19:06

## 2025-01-26 RX ADMIN — SODIUM CHLORIDE 75 MILLILITER(S): 9 INJECTION, SOLUTION INTRAVENOUS at 09:03

## 2025-01-26 RX ADMIN — NICOTINE POLACRILEX 1 PATCH: 4 LOZENGE ORAL at 08:54

## 2025-01-26 RX ADMIN — PREGABALIN CAPSULES, CV 150 MILLIGRAM(S): 225 CAPSULE ORAL at 21:37

## 2025-01-26 RX ADMIN — ACETAMINOPHEN 650 MILLIGRAM(S): 160 SUSPENSION ORAL at 14:00

## 2025-01-26 RX ADMIN — ACETAMINOPHEN 650 MILLIGRAM(S): 160 SUSPENSION ORAL at 21:38

## 2025-01-26 RX ADMIN — PANTOPRAZOLE 40 MILLIGRAM(S): 20 TABLET, DELAYED RELEASE ORAL at 05:53

## 2025-01-26 RX ADMIN — NICOTINE POLACRILEX 1 PATCH: 4 LOZENGE ORAL at 13:12

## 2025-01-26 RX ADMIN — NICOTINE POLACRILEX 1 PATCH: 4 LOZENGE ORAL at 13:08

## 2025-01-26 RX ADMIN — PREGABALIN CAPSULES, CV 150 MILLIGRAM(S): 225 CAPSULE ORAL at 05:53

## 2025-01-26 RX ADMIN — ACETAMINOPHEN 650 MILLIGRAM(S): 160 SUSPENSION ORAL at 22:15

## 2025-01-26 RX ADMIN — ACETAMINOPHEN 650 MILLIGRAM(S): 160 SUSPENSION ORAL at 13:09

## 2025-01-26 RX ADMIN — SODIUM CHLORIDE 75 MILLILITER(S): 9 INJECTION, SOLUTION INTRAVENOUS at 16:37

## 2025-01-26 RX ADMIN — Medication 20 MILLIGRAM(S): at 13:08

## 2025-01-26 RX ADMIN — ENOXAPARIN SODIUM 40 MILLIGRAM(S): 100 INJECTION SUBCUTANEOUS at 21:37

## 2025-01-26 NOTE — PROGRESS NOTE ADULT - SUBJECTIVE AND OBJECTIVE BOX
INTERVAL HPI/OVERNIGHT EVENTS:  Patient seen,no new issues,stable  VITAL SIGNS:  T(F): 97.4 (01-26-25 @ 05:20)  HR: 72 (01-26-25 @ 05:20)  BP: 102/58 (01-26-25 @ 05:20)  RR: 17 (01-26-25 @ 05:20)  SpO2: 92% (01-26-25 @ 05:20)  Wt(kg): --    PHYSICAL EXAM:  awake  Constitutional:  Eyes:  ENMT:perrla  Neck:  Respiratory:clear  Cardiovascular:s1s2,m-none  Gastrointestinal:soft,bs pos  Extremities:  Vascular:  Neurological:no focal deficit  Musculoskeletal:    MEDICATIONS  (STANDING):  amLODIPine   Tablet 2.5 milliGRAM(s) Oral daily  dextrose 5%. 1000 milliLiter(s) (75 mL/Hr) IV Continuous <Continuous>  dextrose 5%. 1000 milliLiter(s) (75 mL/Hr) IV Continuous <Continuous>  enoxaparin Injectable 40 milliGRAM(s) SubCutaneous every 24 hours  lisinopril 20 milliGRAM(s) Oral daily  nicotine -  14 mG/24Hr(s) Patch 1 Patch Transdermal every 24 hours  pantoprazole    Tablet 40 milliGRAM(s) Oral before breakfast  pregabalin 150 milliGRAM(s) Oral three times a day    MEDICATIONS  (PRN):  acetaminophen     Tablet .. 650 milliGRAM(s) Oral every 6 hours PRN Temp greater or equal to 38C (100.4F), Mild Pain (1 - 3)  aluminum hydroxide/magnesium hydroxide/simethicone Suspension 30 milliLiter(s) Oral every 4 hours PRN Dyspepsia  melatonin 3 milliGRAM(s) Oral at bedtime PRN Insomnia  ondansetron Injectable 4 milliGRAM(s) IV Push every 8 hours PRN Nausea and/or Vomiting      Allergies    sulfa drugs (Unknown)    Intolerances        LABS:                        11.6   5.55  )-----------( 261      ( 26 Jan 2025 06:25 )             33.5     01-26    126[L]  |  94[L]  |  15  ----------------------------<  101[H]  4.0   |  28  |  0.75    Ca    9.1      26 Jan 2025 06:25  Phos  3.5     01-26  Mg     1.8     01-26    TPro  7.3  /  Alb  3.5  /  TBili  0.7  /  DBili  x   /  AST  17  /  ALT  13  /  AlkPhos  141[H]  01-24      Urinalysis Basic - ( 26 Jan 2025 06:25 )    Color: x / Appearance: x / SG: x / pH: x  Gluc: 101 mg/dL / Ketone: x  / Bili: x / Urobili: x   Blood: x / Protein: x / Nitrite: x   Leuk Esterase: x / RBC: x / WBC x   Sq Epi: x / Non Sq Epi: x / Bacteria: x        RADIOLOGY & ADDITIONAL TESTS:      · Assessment	  73 y/o hx of COPD ( current smoker), Peripheral Neuropathy, HTN present w/ chief complain of syncopal episode At ED, VS notable for hypotension 91/51, / Na of 123 Serum Osm 262, UA negative for Infx, some protenuria.  s/p 1L NS Admitted to medicine for the evaluation and management of Hyponatremia.       NEEDS MED REC, PT UNABLE TO RECALL MEDICATIONS, PHARMACY CLOSE.        Problem/Plan - 1:  ·  Problem: Syncope.   ·  Plan: p/w syncope   no chest pain, palpitations  Low suspicion for cardiac in nature  - Obtain an Echo  - PT eval  - Orthostatics.     Problem/Plan - 2:  ·  Problem: Hyponatremia- improved  ·  Plan: Na 126  Hypotonic Hypovolemia  Osm <280, classified based on volume status     Low BP, Likely secondary to poor oral intake,  s/p 1L NS     - F/U Urine Na   - F/U BMP AT 8PM, then BMP Q6  - F/U TSH  - F/U cortisol   - Orthostatics.     Problem/Plan - 3:  ·  Problem: HTN (hypertension).   ·  Plan: Hold BP meds in the setting of hypotension    need med rec.     Problem/Plan - 4:  ·  Problem: HLD (hyperlipidemia).   ·  Plan: Needs med rec.     Problem/Plan - 5:  ·  Problem: H/O neuropathy.   ·  Plan: c/w Home medication Lyrica 150mg 3x/day.     Problem/Plan - 6:  ·  Problem: History of COPD-stable clinically   ·  Plan: Not on home oxygen   denies any symptoms   No meds.     Problem/Plan - 7:  ·  Problem: Prophylactic measure.   ·  Plan: DVT: Lovenox.

## 2025-01-26 NOTE — CHART NOTE - NSCHARTNOTEFT_GEN_A_CORE
EVENT: Na noted to be 126    SUBJECTIVE: Patient seen and examined at bedside. Offers no new complaints. She states she is little disappointed about her Na levels, otherwise ok.     OBJECTIVE:  Vital Signs Last 24 Hrs  T(C): 36.3 (26 Jan 2025 05:20), Max: 37 (25 Jan 2025 20:15)  T(F): 97.4 (26 Jan 2025 05:20), Max: 98.6 (25 Jan 2025 20:15)  HR: 72 (26 Jan 2025 05:20) (71 - 78)  BP: 102/58 (26 Jan 2025 05:20) (102/58 - 161/77)  BP(mean): 89 (25 Jan 2025 20:15) (89 - 89)  RR: 17 (26 Jan 2025 05:20) (17 - 18)  SpO2: 92% (26 Jan 2025 05:20) (92% - 96%)    Parameters below as of 26 Jan 2025 05:20  Patient On (Oxygen Delivery Method): room air        LABS:                        11.6   5.55  )-----------( 261      ( 26 Jan 2025 06:25 )             33.5     01-26    126[L]  |  94[L]  |  15  ----------------------------<  101[H]  4.0   |  28  |  0.75    Ca    9.1      26 Jan 2025 06:25  Phos  3.5     01-26  Mg     1.8     01-26    TPro  7.3  /  Alb  3.5  /  TBili  0.7  /  DBili  x   /  AST  17  /  ALT  13  /  AlkPhos  141[H]  01-24      EKG:   IMGAGING:    ASSESSMENT:  Hypotonic Hypovolemia  -         HPI:  75 y/o hx of COPD ( current smoker), Peripheral Neuropathy, HTN present w/ chief complain of syncopal episode for two minutes, Pt was at her sister house accompanied by , recalls that she began to feel lightheaded when her sister began to ask her if she was feeling ok, she sat down at the chair and does not recall LOC however,  witnesses (  and Sister) reports that patient LOC lasted about two minutes. No abnormal movements were noted, urine or fecal incontinence. After two minutes, patient recovered consciousness w/o post-ictal symptoms. Patient denies any prodromal symptoms except for lightheadedness and blurry vision. Pt reports that she took her BP medication that morning, and that she has not had anything to eat since the day before. Patient admits to poor oral intake, with diet consisting of small bites, lots of tea and crackers.  Patient reports having syncopal episodes when she was younger but contributed to just being easily fainted. Patient denies fevers, chills, chest pain, sob, abdominal pain, nausea/vomiting, diarrhea or constipation. Pt endorsed urinary frequency and nocturia, but denies dysuria.     At ED, VS notable for hypotension 91/51  CMP w/ Na of 123 Serum Osm 262, Glucose 103  UA negative for Infx, some protenuria.   s/p 1L NS      (24 Jan 2025 19:40)      PLAN:   Trend Na q6hrs, avoid correcting more than 6mEq in 24hr,  Dextrose 5%. 1000 milliliter(s) (75 mL/Hr) IV Continuous X 24 hrs to prevent over correction  Dr Castelan consulted   Will follow EVENT: Na noted to be 126    SUBJECTIVE: Patient seen and examined at bedside. Offers no new complaints. She states she is little disappointed about her Na levels, otherwise ok.     OBJECTIVE:  Vital Signs Last 24 Hrs  T(C): 36.3 (26 Jan 2025 05:20), Max: 37 (25 Jan 2025 20:15)  T(F): 97.4 (26 Jan 2025 05:20), Max: 98.6 (25 Jan 2025 20:15)  HR: 72 (26 Jan 2025 05:20) (71 - 78)  BP: 102/58 (26 Jan 2025 05:20) (102/58 - 161/77)  BP(mean): 89 (25 Jan 2025 20:15) (89 - 89)  RR: 17 (26 Jan 2025 05:20) (17 - 18)  SpO2: 92% (26 Jan 2025 05:20) (92% - 96%)    Parameters below as of 26 Jan 2025 05:20  Patient On (Oxygen Delivery Method): room air        LABS:                        11.6   5.55  )-----------( 261      ( 26 Jan 2025 06:25 )             33.5     01-26    126[L]  |  94[L]  |  15  ----------------------------<  101[H]  4.0   |  28  |  0.75    Ca    9.1      26 Jan 2025 06:25  Phos  3.5     01-26  Mg     1.8     01-26    TPro  7.3  /  Alb  3.5  /  TBili  0.7  /  DBili  x   /  AST  17  /  ALT  13  /  AlkPhos  141[H]  01-24      EKG:   IMGAGING:    ASSESSMENT:  Hypotonic Hypovolemia  -         HPI:  75 y/o hx of COPD ( current smoker), Peripheral Neuropathy, HTN present w/ chief complain of syncopal episode for two minutes, Pt was at her sister house accompanied by , recalls that she began to feel lightheaded when her sister began to ask her if she was feeling ok, she sat down at the chair and does not recall LOC however,  witnesses (  and Sister) reports that patient LOC lasted about two minutes. No abnormal movements were noted, urine or fecal incontinence. After two minutes, patient recovered consciousness w/o post-ictal symptoms. Patient denies any prodromal symptoms except for lightheadedness and blurry vision. Pt reports that she took her BP medication that morning, and that she has not had anything to eat since the day before. Patient admits to poor oral intake, with diet consisting of small bites, lots of tea and crackers.  Patient reports having syncopal episodes when she was younger but contributed to just being easily fainted. Patient denies fevers, chills, chest pain, sob, abdominal pain, nausea/vomiting, diarrhea or constipation. Pt endorsed urinary frequency and nocturia, but denies dysuria.     At ED, VS notable for hypotension 91/51  CMP w/ Na of 123 Serum Osm 262, Glucose 103  UA negative for Infx, some protenuria.   s/p 1L NS      (24 Jan 2025 19:40)      PLAN:   Trend Na q6hrs  Restarted D5  Dr Castelan consulted     Repeat BMP - 125   Discussed with Dr Castelan  Will stop IV fluids   Monitor off fluids   Repeat Serum osmo, BMP, Urine osmo and Urine Na  Plan discussed with patient

## 2025-01-27 DIAGNOSIS — Z71.89 OTHER SPECIFIED COUNSELING: ICD-10-CM

## 2025-01-27 LAB
ALBUMIN SERPL ELPH-MCNC: 3.1 G/DL — LOW (ref 3.5–5)
ALP SERPL-CCNC: 117 U/L — SIGNIFICANT CHANGE UP (ref 40–120)
ALT FLD-CCNC: 12 U/L DA — SIGNIFICANT CHANGE UP (ref 10–60)
ANION GAP SERPL CALC-SCNC: 10 MMOL/L — SIGNIFICANT CHANGE UP (ref 5–17)
ANION GAP SERPL CALC-SCNC: 5 MMOL/L — SIGNIFICANT CHANGE UP (ref 5–17)
ANION GAP SERPL CALC-SCNC: 9 MMOL/L — SIGNIFICANT CHANGE UP (ref 5–17)
AST SERPL-CCNC: 17 U/L — SIGNIFICANT CHANGE UP (ref 10–40)
BASOPHILS # BLD AUTO: 0.06 K/UL — SIGNIFICANT CHANGE UP (ref 0–0.2)
BASOPHILS NFR BLD AUTO: 1.2 % — SIGNIFICANT CHANGE UP (ref 0–2)
BILIRUB SERPL-MCNC: 0.8 MG/DL — SIGNIFICANT CHANGE UP (ref 0.2–1.2)
BUN SERPL-MCNC: 17 MG/DL — SIGNIFICANT CHANGE UP (ref 7–18)
BUN SERPL-MCNC: 18 MG/DL — SIGNIFICANT CHANGE UP (ref 7–18)
BUN SERPL-MCNC: 18 MG/DL — SIGNIFICANT CHANGE UP (ref 7–18)
CALCIUM SERPL-MCNC: 9.1 MG/DL — SIGNIFICANT CHANGE UP (ref 8.4–10.5)
CALCIUM SERPL-MCNC: 9.3 MG/DL — SIGNIFICANT CHANGE UP (ref 8.4–10.5)
CALCIUM SERPL-MCNC: 9.4 MG/DL — SIGNIFICANT CHANGE UP (ref 8.4–10.5)
CHLORIDE SERPL-SCNC: 91 MMOL/L — LOW (ref 96–108)
CHLORIDE SERPL-SCNC: 92 MMOL/L — LOW (ref 96–108)
CHLORIDE SERPL-SCNC: 95 MMOL/L — LOW (ref 96–108)
CO2 SERPL-SCNC: 27 MMOL/L — SIGNIFICANT CHANGE UP (ref 22–31)
CREAT SERPL-MCNC: 0.78 MG/DL — SIGNIFICANT CHANGE UP (ref 0.5–1.3)
CREAT SERPL-MCNC: 0.86 MG/DL — SIGNIFICANT CHANGE UP (ref 0.5–1.3)
CREAT SERPL-MCNC: 0.92 MG/DL — SIGNIFICANT CHANGE UP (ref 0.5–1.3)
EGFR: 65 ML/MIN/1.73M2 — SIGNIFICANT CHANGE UP
EGFR: 70 ML/MIN/1.73M2 — SIGNIFICANT CHANGE UP
EGFR: 79 ML/MIN/1.73M2 — SIGNIFICANT CHANGE UP
EOSINOPHIL # BLD AUTO: 0.26 K/UL — SIGNIFICANT CHANGE UP (ref 0–0.5)
EOSINOPHIL NFR BLD AUTO: 5.2 % — SIGNIFICANT CHANGE UP (ref 0–6)
GLUCOSE SERPL-MCNC: 117 MG/DL — HIGH (ref 70–99)
GLUCOSE SERPL-MCNC: 119 MG/DL — HIGH (ref 70–99)
GLUCOSE SERPL-MCNC: 98 MG/DL — SIGNIFICANT CHANGE UP (ref 70–99)
HCT VFR BLD CALC: 32.4 % — LOW (ref 34.5–45)
HGB BLD-MCNC: 11.3 G/DL — LOW (ref 11.5–15.5)
IMM GRANULOCYTES NFR BLD AUTO: 0.2 % — SIGNIFICANT CHANGE UP (ref 0–0.9)
LYMPHOCYTES # BLD AUTO: 1.73 K/UL — SIGNIFICANT CHANGE UP (ref 1–3.3)
LYMPHOCYTES # BLD AUTO: 34.5 % — SIGNIFICANT CHANGE UP (ref 13–44)
MAGNESIUM SERPL-MCNC: 1.8 MG/DL — SIGNIFICANT CHANGE UP (ref 1.6–2.6)
MCHC RBC-ENTMCNC: 28.3 PG — SIGNIFICANT CHANGE UP (ref 27–34)
MCHC RBC-ENTMCNC: 34.9 G/DL — SIGNIFICANT CHANGE UP (ref 32–36)
MCV RBC AUTO: 81 FL — SIGNIFICANT CHANGE UP (ref 80–100)
MONOCYTES # BLD AUTO: 0.63 K/UL — SIGNIFICANT CHANGE UP (ref 0–0.9)
MONOCYTES NFR BLD AUTO: 12.5 % — SIGNIFICANT CHANGE UP (ref 2–14)
NEUTROPHILS # BLD AUTO: 2.33 K/UL — SIGNIFICANT CHANGE UP (ref 1.8–7.4)
NEUTROPHILS NFR BLD AUTO: 46.4 % — SIGNIFICANT CHANGE UP (ref 43–77)
NRBC # BLD: 0 /100 WBCS — SIGNIFICANT CHANGE UP (ref 0–0)
NRBC BLD-RTO: 0 /100 WBCS — SIGNIFICANT CHANGE UP (ref 0–0)
PHOSPHATE SERPL-MCNC: 4.1 MG/DL — SIGNIFICANT CHANGE UP (ref 2.5–4.5)
PLATELET # BLD AUTO: 253 K/UL — SIGNIFICANT CHANGE UP (ref 150–400)
POTASSIUM SERPL-MCNC: 3.5 MMOL/L — SIGNIFICANT CHANGE UP (ref 3.5–5.3)
POTASSIUM SERPL-MCNC: 3.8 MMOL/L — SIGNIFICANT CHANGE UP (ref 3.5–5.3)
POTASSIUM SERPL-MCNC: 4.4 MMOL/L — SIGNIFICANT CHANGE UP (ref 3.5–5.3)
POTASSIUM SERPL-SCNC: 3.5 MMOL/L — SIGNIFICANT CHANGE UP (ref 3.5–5.3)
POTASSIUM SERPL-SCNC: 3.8 MMOL/L — SIGNIFICANT CHANGE UP (ref 3.5–5.3)
POTASSIUM SERPL-SCNC: 4.4 MMOL/L — SIGNIFICANT CHANGE UP (ref 3.5–5.3)
PROT SERPL-MCNC: 6.3 G/DL — SIGNIFICANT CHANGE UP (ref 6–8.3)
RBC # BLD: 4 M/UL — SIGNIFICANT CHANGE UP (ref 3.8–5.2)
RBC # FLD: 13.1 % — SIGNIFICANT CHANGE UP (ref 10.3–14.5)
SODIUM SERPL-SCNC: 124 MMOL/L — LOW (ref 135–145)
SODIUM SERPL-SCNC: 128 MMOL/L — LOW (ref 135–145)
SODIUM SERPL-SCNC: 131 MMOL/L — LOW (ref 135–145)
WBC # BLD: 5.02 K/UL — SIGNIFICANT CHANGE UP (ref 3.8–10.5)
WBC # FLD AUTO: 5.02 K/UL — SIGNIFICANT CHANGE UP (ref 3.8–10.5)

## 2025-01-27 PROCEDURE — 71250 CT THORAX DX C-: CPT | Mod: 26

## 2025-01-27 RX ORDER — BACTERIOSTATIC SODIUM CHLORIDE 0.9 %
1 VIAL (ML) INJECTION EVERY 8 HOURS
Refills: 0 | Status: DISCONTINUED | OUTPATIENT
Start: 2025-01-27 | End: 2025-01-29

## 2025-01-27 RX ORDER — BACTERIOSTATIC SODIUM CHLORIDE 0.9 %
1 VIAL (ML) INJECTION THREE TIMES A DAY
Refills: 0 | Status: DISCONTINUED | OUTPATIENT
Start: 2025-01-27 | End: 2025-01-27

## 2025-01-27 RX ADMIN — Medication 1 GRAM(S): at 13:24

## 2025-01-27 RX ADMIN — PREGABALIN CAPSULES, CV 150 MILLIGRAM(S): 225 CAPSULE ORAL at 21:38

## 2025-01-27 RX ADMIN — ACETAMINOPHEN 650 MILLIGRAM(S): 160 SUSPENSION ORAL at 11:30

## 2025-01-27 RX ADMIN — Medication 30 MILLILITER(S): at 13:34

## 2025-01-27 RX ADMIN — Medication 1 GRAM(S): at 13:18

## 2025-01-27 RX ADMIN — NICOTINE POLACRILEX 1 PATCH: 4 LOZENGE ORAL at 19:35

## 2025-01-27 RX ADMIN — NICOTINE POLACRILEX 1 PATCH: 4 LOZENGE ORAL at 07:40

## 2025-01-27 RX ADMIN — Medication 20 MILLIGRAM(S): at 13:19

## 2025-01-27 RX ADMIN — ACETAMINOPHEN 650 MILLIGRAM(S): 160 SUSPENSION ORAL at 10:54

## 2025-01-27 RX ADMIN — NICOTINE POLACRILEX 1 PATCH: 4 LOZENGE ORAL at 13:25

## 2025-01-27 RX ADMIN — ENOXAPARIN SODIUM 40 MILLIGRAM(S): 100 INJECTION SUBCUTANEOUS at 21:38

## 2025-01-27 RX ADMIN — ACETAMINOPHEN 650 MILLIGRAM(S): 160 SUSPENSION ORAL at 21:37

## 2025-01-27 RX ADMIN — PANTOPRAZOLE 40 MILLIGRAM(S): 20 TABLET, DELAYED RELEASE ORAL at 05:00

## 2025-01-27 RX ADMIN — Medication 1 GRAM(S): at 21:38

## 2025-01-27 RX ADMIN — ACETAMINOPHEN 650 MILLIGRAM(S): 160 SUSPENSION ORAL at 22:20

## 2025-01-27 RX ADMIN — PREGABALIN CAPSULES, CV 150 MILLIGRAM(S): 225 CAPSULE ORAL at 05:00

## 2025-01-27 NOTE — PROGRESS NOTE ADULT - SUBJECTIVE AND OBJECTIVE BOX
HPI:  75 y/o hx of COPD ( current smoker), Peripheral Neuropathy, HTN present w/ chief complain of syncopal episode for two minutes, Pt was at her sister house accompanied by , recalls that she began to feel lightheaded when her sister began to ask her if she was feeling ok, she sat down at the chair and does not recall LOC however,  witnesses (  and Sister) reports that patient LOC lasted about two minutes. No abnormal movements were noted, urine or fecal incontinence. After two minutes, patient recovered consciousness w/o post-ictal symptoms. Patient denies any prodromal symptoms except for lightheadedness and blurry vision. Pt reports that she took her BP medication that morning, and that she has not had anything to eat since the day before. Patient admits to poor oral intake, with diet consisting of small bites, lots of tea and crackers.  Patient reports having syncopal episodes when she was younger but contributed to just being easily fainted. Patient denies fevers, chills, chest pain, sob, abdominal pain, nausea/vomiting, diarrhea or constipation. Pt endorsed urinary frequency and nocturia, but denies dysuria.     At ED, VS notable for hypotension 91/51  CMP w/ Na of 123 Serum Osm 262, Glucose 103  UA negative for Infx, some protenuria.   s/p 1L NS      (24 Jan 2025 19:40)      OVERNIGHT EVENTS:    No new overnight events.  Seen and examined at bedside.     REVIEW OF SYSTEMS:      EYES: no acute visual disturbances  NECK: No pain or stiffness  RESPIRATORY: No cough; No shortness of breath  CARDIOVASCULAR: No chest pain, no palpitations  GASTROINTESTINAL: No pain. No nausea, vomiting or diarrhea   NEUROLOGICAL: No headache or numbness, no tremors  MUSCULOSKELETAL: No joint pain, no muscle pain  GENITOURINARY: no dysuria, no frequency, no hesitancy  PSYCHIATRY: no depression, no anxiety  ALL OTHER  ROS negative        Vital Signs Last 24 Hrs  T(C): 36.7 (27 Jan 2025 12:13), Max: 36.7 (27 Jan 2025 12:13)  T(F): 98.1 (27 Jan 2025 12:13), Max: 98.1 (27 Jan 2025 12:13)  HR: 73 (27 Jan 2025 12:13) (65 - 73)  BP: 128/72 (27 Jan 2025 12:13) (103/62 - 151/80)  BP(mean): 91 (27 Jan 2025 12:13) (91 - 91)  RR: 18 (27 Jan 2025 12:13) (18 - 18)  SpO2: 94% (27 Jan 2025 12:13) (94% - 94%)    Parameters below as of 27 Jan 2025 12:13  Patient On (Oxygen Delivery Method): room air        ________________________________________________  PHYSICAL EXAM:    GENERAL: NAD  HEENT: Normocephalic; conjunctivae and sclerae clear;  NECK : supple, no JVD  CHEST/LUNG: Clear to auscultation; Nonlabored  HEART: S1 S2  regular  ABDOMEN: Soft, Nontender, Nondistended; Bowel sounds present  EXTREMITIES: no cyanosis; no LE edema; no calf tenderness  NERVOUS SYSTEM:  Alert; no new deficits  SKIN: warm and dry; No new rashes or lesions    _________________________________________________  CURRENT MEDICATIONS:    MEDICATIONS  (STANDING):  enoxaparin Injectable 40 milliGRAM(s) SubCutaneous every 24 hours  lisinopril 20 milliGRAM(s) Oral daily  nicotine -  14 mG/24Hr(s) Patch 1 Patch Transdermal every 24 hours  pantoprazole    Tablet 40 milliGRAM(s) Oral before breakfast  pregabalin 150 milliGRAM(s) Oral three times a day  sodium chloride 1 Gram(s) Oral three times a day    MEDICATIONS  (PRN):  acetaminophen     Tablet .. 650 milliGRAM(s) Oral every 6 hours PRN Temp greater or equal to 38C (100.4F), Mild Pain (1 - 3)  aluminum hydroxide/magnesium hydroxide/simethicone Suspension 30 milliLiter(s) Oral every 4 hours PRN Dyspepsia  melatonin 3 milliGRAM(s) Oral at bedtime PRN Insomnia  ondansetron Injectable 4 milliGRAM(s) IV Push every 8 hours PRN Nausea and/or Vomiting      __________________________________________________  LABS:                          11.3   5.02  )-----------( 253      ( 27 Jan 2025 07:08 )             32.4     01-27    124[L]  |  92[L]  |  17  ----------------------------<  119[H]  4.4   |  27  |  0.86    Ca    9.1      27 Jan 2025 13:57  Phos  4.1     01-27  Mg     1.8     01-27    TPro  6.3  /  Alb  3.1[L]  /  TBili  0.8  /  DBili  x   /  AST  17  /  ALT  12  /  AlkPhos  117  01-27      Urinalysis Basic - ( 27 Jan 2025 13:57 )    Color: x / Appearance: x / SG: x / pH: x  Gluc: 119 mg/dL / Ketone: x  / Bili: x / Urobili: x   Blood: x / Protein: x / Nitrite: x   Leuk Esterase: x / RBC: x / WBC x   Sq Epi: x / Non Sq Epi: x / Bacteria: x      CAPILLARY BLOOD GLUCOSE          __________________________________________________  RADIOLOGY & ADDITIONAL TESTS:    Imaging Personally Reviewed:  YES    < from: CT Chest No Cont (01.27.25 @ 14:06) >  IMPRESSION:  Emphysema  Micronodules measuring up to 4 mm with upper lobe predominance. In the   setting of prior smoking, respiratory bronchiolitis is a consideration.    < end of copied text >    Consultant(s) Notes Reviewed:   YES     Plan of care was discussed with patient and /or primary care giver; all questions and concerns were addressed and care was aligned with patient's wishes.    Plan discussed with attending and consulting physicians.

## 2025-01-27 NOTE — CONSULT NOTE ADULT - SUBJECTIVE AND OBJECTIVE BOX
Date of Service  01-27-25 @ 11:28    CHIEF COMPLAINT:Patient is a 74y old  Female who presents with a chief complaint of Syncope.      HPI:  73 y/o hx of COPD ( current smoker), Peripheral Neuropathy, HTN present w/ chief complain of syncopal episode for two minutes, Pt was at her sister house accompanied by , recalls that she began to feel lightheaded when her sister began to ask her if she was feeling ok, she sat down at the chair and does not recall LOC however,  witnesses (  and Sister) reports that patient LOC lasted about two minutes. No abnormal movements were noted, urine or fecal incontinence. After two minutes, patient recovered consciousness w/o post-ictal symptoms. Patient denies any prodromal symptoms except for lightheadedness and blurry vision. Pt reports that she took her BP medication that morning, and that she has not had anything to eat since the day before. Patient admits to poor oral intake, with diet consisting of small bites, lots of tea and crackers.  Patient reports having syncopal episodes when she was younger but contributed to just being easily fainted. Patient denies fevers, chills, chest pain, sob, abdominal pain, nausea/vomiting, diarrhea or constipation. Pt endorsed urinary frequency and nocturia, but denies dysuria.     At ED, VS notable for hypotension 91/51  CMP w/ Na of 123 Serum Osm 262, Glucose 103  UA negative for Infx, some protenuria.   s/p 1L NS       PAST MEDICAL & SURGICAL HISTORY:  HTN (hypertension)      HLD (hyperlipidemia)      H/O neuropathy      History of COPD      History of appendectomy          MEDICATIONS  (STANDING):  amLODIPine   Tablet 2.5 milliGRAM(s) Oral daily  enoxaparin Injectable 40 milliGRAM(s) SubCutaneous every 24 hours  lisinopril 20 milliGRAM(s) Oral daily  nicotine -  14 mG/24Hr(s) Patch 1 Patch Transdermal every 24 hours  pantoprazole    Tablet 40 milliGRAM(s) Oral before breakfast  pregabalin 150 milliGRAM(s) Oral three times a day    MEDICATIONS  (PRN):  acetaminophen     Tablet .. 650 milliGRAM(s) Oral every 6 hours PRN Temp greater or equal to 38C (100.4F), Mild Pain (1 - 3)  aluminum hydroxide/magnesium hydroxide/simethicone Suspension 30 milliLiter(s) Oral every 4 hours PRN Dyspepsia  melatonin 3 milliGRAM(s) Oral at bedtime PRN Insomnia  ondansetron Injectable 4 milliGRAM(s) IV Push every 8 hours PRN Nausea and/or Vomiting      FAMILY HISTORY:  FH: HTN (hypertension)    Family history of diabetes mellitus (DM)        SOCIAL HISTORY:    [x ] smoker    [ x] Alcohol-denies    Allergies    sulfa drugs (Unknown)    Intolerances    	    REVIEW OF SYSTEMS:  CONSTITUTIONAL: No fever, weight loss, or fatigue  EYES: No eye pain, visual disturbances, or discharge  ENT:  No difficulty hearing, tinnitus, vertigo; No sinus or throat pain  NECK: No pain or stiffness  RESPIRATORY: No cough, wheezing, chills or hemoptysis; No Shortness of Breath  CARDIOVASCULAR: No chest pain, palpitations,+ passing out,No  dizziness, or leg swelling  GASTROINTESTINAL: No abdominal or epigastric pain. No nausea, vomiting, or hematemesis; No diarrhea or constipation. No melena or hematochezia.  GENITOURINARY: No dysuria, frequency, hematuria, or incontinence  NEUROLOGICAL: No headaches, memory loss, loss of strength, numbness, or tremors  SKIN: No itching, burning, rashes, or lesions   LYMPH Nodes: No enlarged glands  ENDOCRINE: No heat or cold intolerance; No hair loss  MUSCULOSKELETAL: No joint pain or swelling; No muscle, back, or extremity pain  PSYCHIATRIC: No depression, anxiety, mood swings, or difficulty sleeping  HEME/LYMPH: No easy bruising, or bleeding gums  ALLERGY AND IMMUNOLOGIC: No hives or eczema	        PHYSICAL EXAM:  T(C): 36.2 (01-27-25 @ 05:09), Max: 36.9 (01-26-25 @ 12:20)  HR: 65 (01-27-25 @ 05:09) (65 - 89)  BP: 103/62 (01-27-25 @ 05:09) (103/62 - 151/80)  RR: 18 (01-27-25 @ 05:09) (17 - 18)  SpO2: 94% (01-27-25 @ 05:09) (94% - 95%)  Wt(kg): --  I&O's Summary      Appearance: Normal	  HEENT:   Normal oral mucosa, PERRL, EOMI	  Lymphatic: No lymphadenopathy  Cardiovascular: Normal S1 S2, No JVD, No murmurs, No edema  Respiratory: Lungs clear to auscultation	  Psychiatry: A & O x 3, Mood & affect appropriate  Gastrointestinal:  Soft, Non-tender, + BS	  Skin: No rashes, No ecchymoses, No cyanosis	  Neurologic: Non-focal  Extremities: Normal range of motion, No clubbing, cyanosis or edema  Vascular: Peripheral pulses palpable 2+ bilaterally    	    ECG:  	nsr,nl axis    LABS:	 	    Troponin I, High Sensitivity Result: 5.6 ng/L (01-24-25 @ 16:00)                          11.3   5.02  )-----------( 253      ( 27 Jan 2025 07:08 )             32.4     01-27    128[L]  |  91[L]  |  18  ----------------------------<  98  3.5   |  27  |  0.78    Ca    9.4      27 Jan 2025 07:08  Phos  4.1     01-27  Mg     1.8     01-27    TPro  6.3  /  Alb  3.1[L]  /  TBili  0.8  /  DBili  x   /  AST  17  /  ALT  12  /  AlkPhos  117  01-27    < from: Xray Chest 1 View AP/PA (01.24.25 @ 17:56) >  ACC: [id number] EXAM:  XR CHEST AP OR PA 1V   ORDERED BY: JUSTO KAMARA     PROCEDURE DATE:  01/24/2025          INTERPRETATION:  TECHNIQUE: Single portable view of the chest.    COMPARISON:  9/13/2023    CLINICAL HISTORY: Chest Pain    FINDINGS:    Single frontal view of the chest demonstrates the lungs to be clear. The   cardiomediastinal silhouette is unremarkable. No acute osseous   abnormalities. Overlying EKG leads and wires are noted. Osteopenia.   Multiple chronic bilateral rib fractures.    IMPRESSION: No acute cardiopulmonary disease process.    --- End of Report ---            JOSE ORTEGA MD; Attending Radiologist  This document has been electronically signed. Jan 24 2025 11:26PM    < end of copied text >  < from: TTE W or WO Ultrasound Enhancing Agent (01.25.25 @ 08:57) >  CONCLUSIONS:      1. Left ventricular cavity is normal in size. Left ventricular wall thickness is normal. Left ventricular systolic function is normal with an ejection fraction of 64 % by Siddiqui's method of disks. There are no regional wall motion abnormalities seen.   2. Normal left ventricular diastolic function.   3. Normal right ventricular cavity size, with normal wall thickness, and normal right ventricular systolic function. Tricuspid annular plane systolic excursion (TAPSE) is 1.7 cm (normal >=1.7 cm). Tricuspid annular tissue Doppler S' is 13.0 cm/s (normal >10 cm/s).   4. Normal left and right atrial size.   5. Pulmonary artery systolic pressure could not be estimated.   6. Mild aortic regurgitation.   7. No pericardial effusionseen.   8. No prior echocardiogram is available for comparison.    < end of copied text >        
Efraín Castelan MD (Nephrology)  [address], [facility],  [address],  [place] [id number]  TEl: [phone]  Cell: [phone]    Patient is a 74y old  Female who presents with a chief complaint of Syncope (26 Jan 2025 08:28)      HPI:  73 y/o hx of COPD ( current smoker), Peripheral Neuropathy, HTN present w/ chief complain of syncopal episode for two minutes, Pt was at her sister house accompanied by , recalls that she began to feel lightheaded when her sister began to ask her if she was feeling ok, she sat down at the chair and does not recall LOC however,  witnesses (  and Sister) reports that patient LOC lasted about two minutes. No abnormal movements were noted, urine or fecal incontinence. After two minutes, patient recovered consciousness w/o post-ictal symptoms. Patient denies any prodromal symptoms except for lightheadedness and blurry vision. Pt reports that she took her BP medication that morning, and that she has not had anything to eat since the day before. Patient admits to poor oral intake, with diet consisting of small bites, lots of tea and crackers.  Patient reports having syncopal episodes when she was younger but contributed to just being easily fainted. Patient denies fevers, chills, chest pain, sob, abdominal pain, nausea/vomiting, diarrhea or constipation. Pt endorsed urinary frequency and nocturia, but denies dysuria.     At ED, VS notable for hypotension 91/51  CMP w/ Na of 123 Serum Osm 262, Glucose 103  UA negative for Infx, some protenuria.   s/p 1L NS      (24 Jan 2025 19:40)      PAST MEDICAL & SURGICAL HISTORY:  HTN (hypertension)      HLD (hyperlipidemia)      H/O neuropathy      History of COPD      History of appendectomy            Allergies:  sulfa drugs (Unknown)      Home Medications:   acetaminophen     Tablet .. 650 milliGRAM(s) Oral every 6 hours PRN  aluminum hydroxide/magnesium hydroxide/simethicone Suspension 30 milliLiter(s) Oral every 4 hours PRN  amLODIPine   Tablet 2.5 milliGRAM(s) Oral daily  enoxaparin Injectable 40 milliGRAM(s) SubCutaneous every 24 hours  lisinopril 20 milliGRAM(s) Oral daily  melatonin 3 milliGRAM(s) Oral at bedtime PRN  nicotine -  14 mG/24Hr(s) Patch 1 Patch Transdermal every 24 hours  ondansetron Injectable 4 milliGRAM(s) IV Push every 8 hours PRN  pantoprazole    Tablet 40 milliGRAM(s) Oral before breakfast  pregabalin 150 milliGRAM(s) Oral three times a day      Hospital Medications:   MEDICATIONS  (STANDING):  amLODIPine   Tablet 2.5 milliGRAM(s) Oral daily  enoxaparin Injectable 40 milliGRAM(s) SubCutaneous every 24 hours  lisinopril 20 milliGRAM(s) Oral daily  nicotine -  14 mG/24Hr(s) Patch 1 Patch Transdermal every 24 hours  pantoprazole    Tablet 40 milliGRAM(s) Oral before breakfast  pregabalin 150 milliGRAM(s) Oral three times a day      SOCIAL HISTORY:  Denies ETOh, Smoking,     Family History:  FAMILY HISTORY:  FH: HTN (hypertension)    Family history of diabetes mellitus (DM)        ROS:  CONSTITUTIONAL: No fever or chills.  HEENT: No headache visual disturbances, hearing impairment.  RESPIRATORY: No shortness of breath, cough, wheezing or hemoptysis  CARDIOVASCULAR: No Chest pain or SOB  GASTROINTESTINAL: No abdominal pain, nausea, vomiting, diarrhea, hematemesis or blood per rectum.  GENITOURINARY: No flank or supra pubic pain  NEUROLOGICAL: No headache,  focal limb weakness, tingling or numbness sensation or seizure like activity  SKIN: No rash or skin lesion  MUSCULOSKELETAL: No leg pain, calf pain or swelling  Psych: Denies suicidal or homicidal ideation    VITALS:  T(F): 98.5 (01-26-25 @ 12:20), Max: 98.5 (01-26-25 @ 12:20)  HR: 69 (01-26-25 @ 13:04)  BP: 134/73 (01-26-25 @ 13:04)  RR: 17 (01-26-25 @ 12:20)  SpO2: 95% (01-26-25 @ 12:20)  Wt(kg): --      CAPILLARY BLOOD GLUCOSE            PHYSICAL EXAM:  GENERAL: Alert, awake, oriented x3   HEENT: JORDIN, EOMI, neck supple, no JVp  CHEST/LUNG: Bilateral clear breath sounds, no rales, no crepitations, no wheezing  HEART: Regular rate and rhythm, ANNA II/VI at LPSB, no gallops, no rub   ABDOMEN: Soft, nontender, non distended, bowel sounds present, no palpable organomegaly, no guarding, no rigidity, no rebound tenderness.  : No flank or supra pubic tenderness.  EXTREMITIES: Peripheral pulses are palpable, no pedal edema, no calf tenderness  Musculoskeletal: No joint or spinal tenderness  Neurology: AAOx3, no focal neurological deficit, Motor and sensory systems are intact.  SKIN: No rash or skin lesion    LABS:  01-26    125[L]  |  92[L]  |  18  ----------------------------<  112[H]  4.0   |  27  |  0.73    Ca    8.6      26 Jan 2025 15:46  Phos  3.5     01-26  Mg     1.8     01-26      Creatinine Trend: 0.73 <--, 0.75 <--, 0.80 <--, 0.90 <--, 0.71 <--, 0.78 <--, 0.82 <--                        11.6   5.55  )-----------( 261      ( 26 Jan 2025 06:25 )             33.5     Urine Studies:  Urinalysis Basic - ( 26 Jan 2025 15:46 )    Color:  / Appearance:  / SG:  / pH:   Gluc: 112 mg/dL / Ketone:   / Bili:  / Urobili:    Blood:  / Protein:  / Nitrite:    Leuk Esterase:  / RBC:  / WBC    Sq Epi:  / Non Sq Epi:  / Bacteria:       Osmolality, Random Urine: 449 mos/kg (01-26 @ 18:30)  Sodium, Random Urine: 47 mmol/L (01-26 @ 18:30)  Sodium, Random Urine: 70 mmol/L (01-24 @ 17:40)  Creatinine, Random Urine: 67 mg/dL (01-24 @ 17:40)  Protein/Creatinine Ratio Calculation: 0.3 Ratio (01-24 @ 17:40)  Osmolality, Random Urine: 421 mos/kg (01-24 @ 17:40)  Potassium, Random Urine: 48 mmol/L (01-24 @ 17:40)      RADIOLOGY & ADDITIONAL STUDIES:    EKG findings reviewed.    Imaging Personally Reviewed:  [x] YES  [ ] NO    Consultant(s) and primary physician Notes Reviewed:  [x] YES  [ ] NO    Care Discussed with Primary team/ Consultants/Other Providers [x] YES  [ ] NO

## 2025-01-27 NOTE — PROGRESS NOTE ADULT - PROBLEM SELECTOR PLAN 2
Na 127 -> 124  Fluid restriction   Sodium tabs  F/u CT head Na 127 -> 124  Fluid restriction   Sodium tabs

## 2025-01-27 NOTE — PROGRESS NOTE ADULT - SUBJECTIVE AND OBJECTIVE BOX
Efraín Castelan MD (Nephrology progress note)  [address], [facility],  [address],  [facility]  TEl: [phone]  Cell: [phone]    Patient is a 74y Female seen and evaluated at bedside. Vital signs, laboratory data, imaging studies, consult notes reviewed done within past 24 hours. Overnight events noted.    Allergies    sulfa drugs (Unknown)    Intolerances        ROS:  CONSTITUTIONAL: No fever or chills.  HEENT: No headcahe or visual disturbances.  RESPIRATORY: No shortness of breath, cough, hemoptysis or wheezing  CARDIOVASCULAR: No Chest pain, shortness of breath, palpitations, dizziness, syncope, orthopnea, PND or leg swelling.  GASTROINTESTINAL: No abdominal pain, nausea, vomiting, diarrhea, hematemesis or blood per rectum.  GENITOURINARY: No urinary frequency, urgency, gross hematuria, dysuria, flank or supra pubic pain, difficulty passing urine.  NEUROLOGICAL: No headache, focal limb weakness, tingling or numbness or seizure like activity  SKIN: No skin rash or lesion  MUSCULOSKELETAL: No leg pain, calf pain or swelling    VITALS:    T(C): 36.2 (01-27-25 @ 05:09), Max: 36.9 (01-26-25 @ 12:20)  HR: 65 (01-27-25 @ 05:09) (65 - 89)  BP: 103/62 (01-27-25 @ 05:09) (103/62 - 151/80)  RR: 18 (01-27-25 @ 05:09) (17 - 18)  SpO2: 94% (01-27-25 @ 05:09) (94% - 95%)  CAPILLARY BLOOD GLUCOSE          MEDICATIONS  (STANDING):  amLODIPine   Tablet 2.5 milliGRAM(s) Oral daily  enoxaparin Injectable 40 milliGRAM(s) SubCutaneous every 24 hours  lisinopril 20 milliGRAM(s) Oral daily  nicotine -  14 mG/24Hr(s) Patch 1 Patch Transdermal every 24 hours  pantoprazole    Tablet 40 milliGRAM(s) Oral before breakfast  pregabalin 150 milliGRAM(s) Oral three times a day    MEDICATIONS  (PRN):  acetaminophen     Tablet .. 650 milliGRAM(s) Oral every 6 hours PRN Temp greater or equal to 38C (100.4F), Mild Pain (1 - 3)  aluminum hydroxide/magnesium hydroxide/simethicone Suspension 30 milliLiter(s) Oral every 4 hours PRN Dyspepsia  melatonin 3 milliGRAM(s) Oral at bedtime PRN Insomnia  ondansetron Injectable 4 milliGRAM(s) IV Push every 8 hours PRN Nausea and/or Vomiting      PHYSICAL EXAM:  GENERAL: Alert, awake and oriented x3 in no distress  HEENT: JORDIN, EOMI, neck supple, no JVP, no carotid bruit, oral mucosa moist and pink.  CHEST/LUNG: Bilateral clear breath sounds, no rales, no crepitations, no rhonchi, no wheezing  HEART: Regular rate and rhythm, ANNA II/VI at LPSB, no gallops, no rub   ABDOMEN: Soft, nontender, non distended, bowel sounds present, no palpable organomegaly  : No flank or supra pubic tenderness.  EXTREMITIES: Peripheral pulses are palpable, no pedal edema  Neurology: AAOx3, no focal neurological deficit  SKIN: No rash or skin lesion  Musculoskeletal: No joint deformity or spinal tenderness.      Vascular ACCESS:     LABS:                        11.3   5.02  )-----------( 253      ( 27 Jan 2025 07:08 )             32.4     01-27    128[L]  |  91[L]  |  18  ----------------------------<  98  3.5   |  27  |  0.78    Ca    9.4      27 Jan 2025 07:08  Phos  4.1     01-27  Mg     1.8     01-27    TPro  6.3  /  Alb  3.1[L]  /  TBili  0.8  /  DBili  x   /  AST  17  /  ALT  12  /  AlkPhos  117  01-27    Osmolality, Serum: 267 mosmol/kg *L* [280 - 301] (01-26 @ 20:22)      Urinalysis Basic - ( 27 Jan 2025 07:08 )    Color: x / Appearance: x / SG: x / pH: x  Gluc: 98 mg/dL / Ketone: x  / Bili: x / Urobili: x   Blood: x / Protein: x / Nitrite: x   Leuk Esterase: x / RBC: x / WBC x   Sq Epi: x / Non Sq Epi: x / Bacteria: x      Osmolality, Random Urine: 449 mos/kg (01-26 @ 18:30)  Sodium, Random Urine: 47 mmol/L (01-26 @ 18:30)        RADIOLOGY & ADDITIONAL STUDIES:    Imaging Personally Reviewed:  [x] YES  [ ] NO    Consultant(s) Notes Reviewed:  [x] YES  [ ] NO    Care Discussed with Primary team/ Other Providers [x] YES  [ ] NO       Efraín Castelan MD (Nephrology progress note)  [address], [facility],  [address],  [facility]  TEl: [phone]  Cell: [phone]    Patient is a 74y Female seen and evaluated at bedside. Vital signs, laboratory data, imaging studies, consult notes reviewed done within past 24 hours. Overnight events noted. Patient lying in bed alert and awake in no respiratory distress offers no new complains. Interval Na level 128 with non oliguria.     Allergies    sulfa drugs (Unknown)    Intolerances        ROS:  CONSTITUTIONAL: No fever or chills.  HEENT: No headache or visual disturbances.  RESPIRATORY: No shortness of breath, cough, hemoptysis or wheezing  CARDIOVASCULAR: No Chest pain, shortness of breath, palpitations, dizziness, syncope, orthopnea, PND or leg swelling.  GASTROINTESTINAL: No abdominal pain, nausea, vomiting, diarrhea, hematemesis or blood per rectum.  GENITOURINARY: No urinary frequency, urgency, gross hematuria, dysuria, flank or supra pubic pain, difficulty passing urine.  NEUROLOGICAL: No headache, focal limb weakness, tingling or numbness or seizure like activity  SKIN: No skin rash or lesion  MUSCULOSKELETAL: No leg pain, calf pain or swelling    VITALS:    T(C): 36.2 (01-27-25 @ 05:09), Max: 36.9 (01-26-25 @ 12:20)  HR: 65 (01-27-25 @ 05:09) (65 - 89)  BP: 103/62 (01-27-25 @ 05:09) (103/62 - 151/80)  RR: 18 (01-27-25 @ 05:09) (17 - 18)  SpO2: 94% (01-27-25 @ 05:09) (94% - 95%)  CAPILLARY BLOOD GLUCOSE          MEDICATIONS  (STANDING):  amLODIPine   Tablet 2.5 milliGRAM(s) Oral daily  enoxaparin Injectable 40 milliGRAM(s) SubCutaneous every 24 hours  lisinopril 20 milliGRAM(s) Oral daily  nicotine -  14 mG/24Hr(s) Patch 1 Patch Transdermal every 24 hours  pantoprazole    Tablet 40 milliGRAM(s) Oral before breakfast  pregabalin 150 milliGRAM(s) Oral three times a day    MEDICATIONS  (PRN):  acetaminophen     Tablet .. 650 milliGRAM(s) Oral every 6 hours PRN Temp greater or equal to 38C (100.4F), Mild Pain (1 - 3)  aluminum hydroxide/magnesium hydroxide/simethicone Suspension 30 milliLiter(s) Oral every 4 hours PRN Dyspepsia  melatonin 3 milliGRAM(s) Oral at bedtime PRN Insomnia  ondansetron Injectable 4 milliGRAM(s) IV Push every 8 hours PRN Nausea and/or Vomiting      PHYSICAL EXAM:  GENERAL: Alert, awake and oriented x3 in no distress  HEENT: JORDIN, EOMI, neck supple, no JVP  CHEST/LUNG: Bilateral clear breath sounds, no rales, no crepitations, no rhonchi, no wheezing  HEART: Regular rate and rhythm, ANNA II/VI at LPSB, no gallops, no rub   ABDOMEN: Soft, nontender, non distended, bowel sounds present, no palpable organomegaly  : No flank or supra pubic tenderness.  EXTREMITIES: Peripheral pulses are palpable, no pedal edema  Neurology: AAOx3, no focal neurological deficit  SKIN: No rash or skin lesion  Musculoskeletal: No joint deformity or spinal tenderness.      Vascular ACCESS: None    LABS:                        11.3   5.02  )-----------( 253      ( 27 Jan 2025 07:08 )             32.4     01-27    128[L]  |  91[L]  |  18  ----------------------------<  98  3.5   |  27  |  0.78    Ca    9.4      27 Jan 2025 07:08  Phos  4.1     01-27  Mg     1.8     01-27    TPro  6.3  /  Alb  3.1[L]  /  TBili  0.8  /  DBili  x   /  AST  17  /  ALT  12  /  AlkPhos  117  01-27    Osmolality, Serum: 267 mosmol/kg *L* [280 - 301] (01-26 @ 20:22)      Urinalysis Basic - ( 27 Jan 2025 07:08 )    Color: x / Appearance: x / SG: x / pH: x  Gluc: 98 mg/dL / Ketone: x  / Bili: x / Urobili: x   Blood: x / Protein: x / Nitrite: x   Leuk Esterase: x / RBC: x / WBC x   Sq Epi: x / Non Sq Epi: x / Bacteria: x      Osmolality, Random Urine: 449 mos/kg (01-26 @ 18:30)  Sodium, Random Urine: 47 mmol/L (01-26 @ 18:30)        RADIOLOGY & ADDITIONAL STUDIES:    Imaging Personally Reviewed:  [x] YES  [ ] NO    Consultant(s) Notes Reviewed:  [x] YES  [ ] NO    Care Discussed with Primary team/ Other Providers [x] YES  [ ] NO

## 2025-01-27 NOTE — CONSULT NOTE ADULT - ASSESSMENT
75 y/o hx of COPD ( current smoker), Peripheral Neuropathy, HTN present w/ chief complain of syncopal episode for two minutes,hypotension and hyponatremia.  1.Othostatic bp.  2.D/C amlodipine-had edema previously.  3.HTN-ACE.  4.Hyponatremia-management as per renal.  5.CT chest w/o contrast.  6.GI and DVT prophylaxis.
75 y/o hx of COPD ( current smoker), Peripheral Neuropathy, HTN present w/ chief complain of syncopal episode for two minutes, Pt was at her sister house accompanied by , recalls that she began to feel lightheaded when her sister began to ask her if she was feeling ok, she sat down at the chair and does not recall LOC however,  witnesses (  and Sister) reports that patient LOC lasted about two minutes. Nephrology consult called for hyponatremia    Assessment:  1) Hypovolemic/Euvolemic hyponatremia likely dehydration/diuretic use vs ADH response due to fall/pain   2) Syncope r/o Seizure/vasovagal episode  3) Hypertension  4) Chronic COPD  5) peripheral Neuropathy  6) Hyperlipidemia    Recommendation:  Strict I/o  Avoid nephrotoxic agents  Na level 125   Urine studies reviewed with urine sodium 47 and urine osm 449  Free water restriction to <1L/day  Monitor BMP and electrolytes every 8 to 12 hrly  Monitor urine output  Hold diuretic therapy  Optimal pain control  Check serum osmolality, TSH, lipid panel  Chest X-ray Pa/Lateral with no acute findings  Neuro checks  Fall/aspiration/seizure precaution  WIll follow

## 2025-01-28 LAB
ANION GAP SERPL CALC-SCNC: 3 MMOL/L — LOW (ref 5–17)
ANION GAP SERPL CALC-SCNC: 9 MMOL/L — SIGNIFICANT CHANGE UP (ref 5–17)
APPEARANCE UR: CLEAR — SIGNIFICANT CHANGE UP
BACTERIA # UR AUTO: ABNORMAL /HPF
BILIRUB UR-MCNC: NEGATIVE — SIGNIFICANT CHANGE UP
BUN SERPL-MCNC: 19 MG/DL — HIGH (ref 7–18)
BUN SERPL-MCNC: 24 MG/DL — HIGH (ref 7–18)
CALCIUM SERPL-MCNC: 9.2 MG/DL — SIGNIFICANT CHANGE UP (ref 8.4–10.5)
CALCIUM SERPL-MCNC: 9.3 MG/DL — SIGNIFICANT CHANGE UP (ref 8.4–10.5)
CHLORIDE SERPL-SCNC: 98 MMOL/L — SIGNIFICANT CHANGE UP (ref 96–108)
CHLORIDE SERPL-SCNC: 98 MMOL/L — SIGNIFICANT CHANGE UP (ref 96–108)
CO2 SERPL-SCNC: 25 MMOL/L — SIGNIFICANT CHANGE UP (ref 22–31)
CO2 SERPL-SCNC: 29 MMOL/L — SIGNIFICANT CHANGE UP (ref 22–31)
COLOR SPEC: YELLOW — SIGNIFICANT CHANGE UP
CREAT SERPL-MCNC: 0.76 MG/DL — SIGNIFICANT CHANGE UP (ref 0.5–1.3)
CREAT SERPL-MCNC: 1.12 MG/DL — SIGNIFICANT CHANGE UP (ref 0.5–1.3)
DIFF PNL FLD: NEGATIVE — SIGNIFICANT CHANGE UP
EGFR: 51 ML/MIN/1.73M2 — LOW
EGFR: 82 ML/MIN/1.73M2 — SIGNIFICANT CHANGE UP
EPI CELLS # UR: PRESENT
GLUCOSE SERPL-MCNC: 101 MG/DL — HIGH (ref 70–99)
GLUCOSE SERPL-MCNC: 102 MG/DL — HIGH (ref 70–99)
GLUCOSE UR QL: NEGATIVE MG/DL — SIGNIFICANT CHANGE UP
HCT VFR BLD CALC: 33.5 % — LOW (ref 34.5–45)
HGB BLD-MCNC: 11.5 G/DL — SIGNIFICANT CHANGE UP (ref 11.5–15.5)
KETONES UR-MCNC: NEGATIVE MG/DL — SIGNIFICANT CHANGE UP
LEUKOCYTE ESTERASE UR-ACNC: ABNORMAL
MAGNESIUM SERPL-MCNC: 2 MG/DL — SIGNIFICANT CHANGE UP (ref 1.6–2.6)
MCHC RBC-ENTMCNC: 28.3 PG — SIGNIFICANT CHANGE UP (ref 27–34)
MCHC RBC-ENTMCNC: 34.3 G/DL — SIGNIFICANT CHANGE UP (ref 32–36)
MCV RBC AUTO: 82.3 FL — SIGNIFICANT CHANGE UP (ref 80–100)
NITRITE UR-MCNC: NEGATIVE — SIGNIFICANT CHANGE UP
NRBC # BLD: 0 /100 WBCS — SIGNIFICANT CHANGE UP (ref 0–0)
NRBC BLD-RTO: 0 /100 WBCS — SIGNIFICANT CHANGE UP (ref 0–0)
OSMOLALITY UR: 614 MOS/KG — SIGNIFICANT CHANGE UP (ref 50–1200)
PH UR: 5.5 — SIGNIFICANT CHANGE UP (ref 5–8)
PHOSPHATE SERPL-MCNC: 4 MG/DL — SIGNIFICANT CHANGE UP (ref 2.5–4.5)
PLATELET # BLD AUTO: 250 K/UL — SIGNIFICANT CHANGE UP (ref 150–400)
POTASSIUM SERPL-MCNC: 3.8 MMOL/L — SIGNIFICANT CHANGE UP (ref 3.5–5.3)
POTASSIUM SERPL-MCNC: 4.3 MMOL/L — SIGNIFICANT CHANGE UP (ref 3.5–5.3)
POTASSIUM SERPL-SCNC: 3.8 MMOL/L — SIGNIFICANT CHANGE UP (ref 3.5–5.3)
POTASSIUM SERPL-SCNC: 4.3 MMOL/L — SIGNIFICANT CHANGE UP (ref 3.5–5.3)
PROT UR-MCNC: NEGATIVE MG/DL — SIGNIFICANT CHANGE UP
RBC # BLD: 4.07 M/UL — SIGNIFICANT CHANGE UP (ref 3.8–5.2)
RBC # FLD: 13.2 % — SIGNIFICANT CHANGE UP (ref 10.3–14.5)
RBC CASTS # UR COMP ASSIST: SIGNIFICANT CHANGE UP /HPF
SODIUM SERPL-SCNC: 130 MMOL/L — LOW (ref 135–145)
SODIUM SERPL-SCNC: 132 MMOL/L — LOW (ref 135–145)
SODIUM UR-SCNC: 65 MMOL/L — SIGNIFICANT CHANGE UP
SP GR SPEC: 1.02 — SIGNIFICANT CHANGE UP (ref 1–1.03)
UROBILINOGEN FLD QL: 0.2 MG/DL — SIGNIFICANT CHANGE UP (ref 0.2–1)
WBC # BLD: 5.43 K/UL — SIGNIFICANT CHANGE UP (ref 3.8–10.5)
WBC # FLD AUTO: 5.43 K/UL — SIGNIFICANT CHANGE UP (ref 3.8–10.5)
WBC UR QL: 3 /HPF — SIGNIFICANT CHANGE UP (ref 0–5)

## 2025-01-28 RX ADMIN — Medication 20 MILLIGRAM(S): at 05:52

## 2025-01-28 RX ADMIN — PREGABALIN CAPSULES, CV 150 MILLIGRAM(S): 225 CAPSULE ORAL at 21:49

## 2025-01-28 RX ADMIN — ACETAMINOPHEN 650 MILLIGRAM(S): 160 SUSPENSION ORAL at 06:29

## 2025-01-28 RX ADMIN — ACETAMINOPHEN 650 MILLIGRAM(S): 160 SUSPENSION ORAL at 18:37

## 2025-01-28 RX ADMIN — Medication 1 GRAM(S): at 13:03

## 2025-01-28 RX ADMIN — NICOTINE POLACRILEX 1 PATCH: 4 LOZENGE ORAL at 07:57

## 2025-01-28 RX ADMIN — Medication 1 GRAM(S): at 05:53

## 2025-01-28 RX ADMIN — ENOXAPARIN SODIUM 40 MILLIGRAM(S): 100 INJECTION SUBCUTANEOUS at 21:49

## 2025-01-28 RX ADMIN — ACETAMINOPHEN 650 MILLIGRAM(S): 160 SUSPENSION ORAL at 16:48

## 2025-01-28 RX ADMIN — Medication 1 GRAM(S): at 21:49

## 2025-01-28 RX ADMIN — ACETAMINOPHEN 650 MILLIGRAM(S): 160 SUSPENSION ORAL at 05:58

## 2025-01-28 RX ADMIN — NICOTINE POLACRILEX 1 PATCH: 4 LOZENGE ORAL at 19:34

## 2025-01-28 RX ADMIN — PREGABALIN CAPSULES, CV 150 MILLIGRAM(S): 225 CAPSULE ORAL at 05:52

## 2025-01-28 RX ADMIN — NICOTINE POLACRILEX 1 PATCH: 4 LOZENGE ORAL at 13:04

## 2025-01-28 RX ADMIN — NICOTINE POLACRILEX 1 PATCH: 4 LOZENGE ORAL at 13:02

## 2025-01-28 RX ADMIN — PANTOPRAZOLE 40 MILLIGRAM(S): 20 TABLET, DELAYED RELEASE ORAL at 05:53

## 2025-01-28 NOTE — PROGRESS NOTE ADULT - SUBJECTIVE AND OBJECTIVE BOX
Efraín Castelan MD (Nephrology progress note)  [address], [facility],  [address],  [facility]  TEl: [phone]  Cell: [phone]    Patient is a 74y Female seen and evaluated at bedside. Vital signs, laboratory data, imaging studies, consult notes reviewed done within past 24 hours. Overnight events noted.    Allergies    sulfa drugs (Unknown)    Intolerances        ROS:  CONSTITUTIONAL: No fever or chills.  HEENT: No headcahe or visual disturbances.  RESPIRATORY: No shortness of breath, cough, hemoptysis or wheezing  CARDIOVASCULAR: No Chest pain, shortness of breath, palpitations, dizziness, syncope, orthopnea, PND or leg swelling.  GASTROINTESTINAL: No abdominal pain, nausea, vomiting, diarrhea, hematemesis or blood per rectum.  GENITOURINARY: No urinary frequency, urgency, gross hematuria, dysuria, flank or supra pubic pain, difficulty passing urine.  NEUROLOGICAL: No headache, focal limb weakness, tingling or numbness or seizure like activity  SKIN: No skin rash or lesion  MUSCULOSKELETAL: No leg pain, calf pain or swelling    VITALS:    T(C): 36.3 (01-28-25 @ 05:00), Max: 36.8 (01-27-25 @ 20:15)  HR: 84 (01-28-25 @ 05:00) (73 - 84)  BP: 121/71 (01-28-25 @ 05:00) (119/64 - 128/72)  RR: 17 (01-28-25 @ 05:00) (17 - 18)  SpO2: 94% (01-28-25 @ 05:00) (94% - 95%)  CAPILLARY BLOOD GLUCOSE          MEDICATIONS  (STANDING):  enoxaparin Injectable 40 milliGRAM(s) SubCutaneous every 24 hours  lisinopril 20 milliGRAM(s) Oral daily  nicotine -  14 mG/24Hr(s) Patch 1 Patch Transdermal every 24 hours  pantoprazole    Tablet 40 milliGRAM(s) Oral before breakfast  pregabalin 150 milliGRAM(s) Oral three times a day  sodium chloride 1 Gram(s) Oral every 8 hours    MEDICATIONS  (PRN):  acetaminophen     Tablet .. 650 milliGRAM(s) Oral every 6 hours PRN Temp greater or equal to 38C (100.4F), Mild Pain (1 - 3)  aluminum hydroxide/magnesium hydroxide/simethicone Suspension 30 milliLiter(s) Oral every 4 hours PRN Dyspepsia  melatonin 3 milliGRAM(s) Oral at bedtime PRN Insomnia  ondansetron Injectable 4 milliGRAM(s) IV Push every 8 hours PRN Nausea and/or Vomiting      PHYSICAL EXAM:  GENERAL: Alert, awake and oriented x3 in no distress  HEENT: JORDIN, EOMI, neck supple, no JVP, no carotid bruit, oral mucosa moist and pink.  CHEST/LUNG: Bilateral clear breath sounds, no rales, no crepitations, no rhonchi, no wheezing  HEART: Regular rate and rhythm, ANNA II/VI at LPSB, no gallops, no rub   ABDOMEN: Soft, nontender, non distended, bowel sounds present, no palpable organomegaly  : No flank or supra pubic tenderness.  EXTREMITIES: Peripheral pulses are palpable, no pedal edema  Neurology: AAOx3, no focal neurological deficit  SKIN: No rash or skin lesion  Musculoskeletal: No joint deformity or spinal tenderness.      Vascular ACCESS:     LABS:                        11.5   5.43  )-----------( 250      ( 28 Jan 2025 06:50 )             33.5     01-28    132[L]  |  98  |  19[H]  ----------------------------<  102[H]  3.8   |  25  |  0.76    Ca    9.3      28 Jan 2025 06:50  Phos  4.0     01-28  Mg     2.0     01-28    TPro  6.3  /  Alb  3.1[L]  /  TBili  0.8  /  DBili  x   /  AST  17  /  ALT  12  /  AlkPhos  117  01-27        Urinalysis Basic - ( 28 Jan 2025 06:50 )    Color: x / Appearance: x / SG: x / pH: x  Gluc: 102 mg/dL / Ketone: x  / Bili: x / Urobili: x   Blood: x / Protein: x / Nitrite: x   Leuk Esterase: x / RBC: x / WBC x   Sq Epi: x / Non Sq Epi: x / Bacteria: x      Osmolality, Random Urine: 449 mos/kg (01-26 @ 18:30)  Sodium, Random Urine: 47 mmol/L (01-26 @ 18:30)        RADIOLOGY & ADDITIONAL STUDIES:    Imaging Personally Reviewed:  [x] YES  [ ] NO    Consultant(s) Notes Reviewed:  [x] YES  [ ] NO    Care Discussed with Primary team/ Other Providers [x] YES  [ ] NO       Efraín Castelan MD (Nephrology progress note)  [address], [facility],  [address],  [facility]  TEl: [phone]  Cell: [phone]    Patient is a 74y Female seen and evaluated at bedside. Vital signs, laboratory data, imaging studies, consult notes reviewed done within past 24 hours. Overnight events noted. Patient lying in bed alert and awake in no respiratory distress feels better. Interval Na level 132 on po salt tablet.     Allergies    sulfa drugs (Unknown)    Intolerances        ROS:  CONSTITUTIONAL: No fever or chills.  HEENT: No headache or visual disturbances.  RESPIRATORY: No shortness of breath, cough, hemoptysis or wheezing  CARDIOVASCULAR: No Chest pain or SOB  GASTROINTESTINAL: No abdominal pain, nausea, vomiting, diarrhea, hematemesis or blood per rectum.  GENITOURINARY: No flank or supra pubic pain  NEUROLOGICAL: No headache, focal limb weakness, tingling or numbness or seizure like activity  SKIN: No skin rash or lesion  MUSCULOSKELETAL: No leg pain, calf pain or swelling    VITALS:    T(C): 36.3 (01-28-25 @ 05:00), Max: 36.8 (01-27-25 @ 20:15)  HR: 84 (01-28-25 @ 05:00) (73 - 84)  BP: 121/71 (01-28-25 @ 05:00) (119/64 - 128/72)  RR: 17 (01-28-25 @ 05:00) (17 - 18)  SpO2: 94% (01-28-25 @ 05:00) (94% - 95%)  CAPILLARY BLOOD GLUCOSE          MEDICATIONS  (STANDING):  enoxaparin Injectable 40 milliGRAM(s) SubCutaneous every 24 hours  lisinopril 20 milliGRAM(s) Oral daily  nicotine -  14 mG/24Hr(s) Patch 1 Patch Transdermal every 24 hours  pantoprazole    Tablet 40 milliGRAM(s) Oral before breakfast  pregabalin 150 milliGRAM(s) Oral three times a day  sodium chloride 1 Gram(s) Oral every 8 hours    MEDICATIONS  (PRN):  acetaminophen     Tablet .. 650 milliGRAM(s) Oral every 6 hours PRN Temp greater or equal to 38C (100.4F), Mild Pain (1 - 3)  aluminum hydroxide/magnesium hydroxide/simethicone Suspension 30 milliLiter(s) Oral every 4 hours PRN Dyspepsia  melatonin 3 milliGRAM(s) Oral at bedtime PRN Insomnia  ondansetron Injectable 4 milliGRAM(s) IV Push every 8 hours PRN Nausea and/or Vomiting      PHYSICAL EXAM:  GENERAL: Alert, awake and oriented x3 in no distress  HEENT: JORDIN, EOMI, neck supple, no JVP, no carotid bruit, oral mucosa moist and pink.  CHEST/LUNG: Bilateral clear breath sounds, no rales, no crepitations, no rhonchi, no wheezing  HEART: Regular rate and rhythm, ANNA II/VI at LPSB, no gallops, no rub   ABDOMEN: Soft, nontender, non distended, bowel sounds present, no palpable organomegaly  : No flank or supra pubic tenderness.  EXTREMITIES: Peripheral pulses are palpable, no pedal edema  Neurology: AAOx3, no focal neurological deficit  SKIN: No rash or skin lesion  Musculoskeletal: No joint deformity or spinal tenderness.      Vascular ACCESS:     LABS:                        11.5   5.43  )-----------( 250      ( 28 Jan 2025 06:50 )             33.5     01-28    132[L]  |  98  |  19[H]  ----------------------------<  102[H]  3.8   |  25  |  0.76    Ca    9.3      28 Jan 2025 06:50  Phos  4.0     01-28  Mg     2.0     01-28    TPro  6.3  /  Alb  3.1[L]  /  TBili  0.8  /  DBili  x   /  AST  17  /  ALT  12  /  AlkPhos  117  01-27        Urinalysis Basic - ( 28 Jan 2025 06:50 )    Color: x / Appearance: x / SG: x / pH: x  Gluc: 102 mg/dL / Ketone: x  / Bili: x / Urobili: x   Blood: x / Protein: x / Nitrite: x   Leuk Esterase: x / RBC: x / WBC x   Sq Epi: x / Non Sq Epi: x / Bacteria: x      Osmolality, Random Urine: 449 mos/kg (01-26 @ 18:30)  Sodium, Random Urine: 47 mmol/L (01-26 @ 18:30)        RADIOLOGY & ADDITIONAL STUDIES:    Imaging Personally Reviewed:  [x] YES  [ ] NO    Consultant(s) Notes Reviewed:  [x] YES  [ ] NO    Care Discussed with Primary team/ Other Providers [x] YES  [ ] NO

## 2025-01-28 NOTE — PROGRESS NOTE ADULT - SUBJECTIVE AND OBJECTIVE BOX
INTERVAL HPI/OVERNIGHT EVENTS:  Patient seen,stable no acute issues  VITAL SIGNS:  T(F): 97.4 (01-28-25 @ 05:00)  HR: 84 (01-28-25 @ 05:00)  BP: 121/71 (01-28-25 @ 05:00)  RR: 17 (01-28-25 @ 05:00)  SpO2: 94% (01-28-25 @ 05:00)  Wt(kg): --    PHYSICAL EXAM:  awake,alert  Constitutional:  Eyes:  ENMT:  Neck:  Respiratory:clear  Cardiovascular:s1s2,m-none  Gastrointestinal:soft,bs pos  Extremities:  Vascular:  Neurological:no focal deficit  Musculoskeletal:    MEDICATIONS  (STANDING):  enoxaparin Injectable 40 milliGRAM(s) SubCutaneous every 24 hours  lisinopril 20 milliGRAM(s) Oral daily  nicotine -  14 mG/24Hr(s) Patch 1 Patch Transdermal every 24 hours  pantoprazole    Tablet 40 milliGRAM(s) Oral before breakfast  pregabalin 150 milliGRAM(s) Oral three times a day  sodium chloride 1 Gram(s) Oral every 8 hours    MEDICATIONS  (PRN):  acetaminophen     Tablet .. 650 milliGRAM(s) Oral every 6 hours PRN Temp greater or equal to 38C (100.4F), Mild Pain (1 - 3)  aluminum hydroxide/magnesium hydroxide/simethicone Suspension 30 milliLiter(s) Oral every 4 hours PRN Dyspepsia  melatonin 3 milliGRAM(s) Oral at bedtime PRN Insomnia  ondansetron Injectable 4 milliGRAM(s) IV Push every 8 hours PRN Nausea and/or Vomiting      Allergies    sulfa drugs (Unknown)    Intolerances        LABS:                        11.5   5.43  )-----------( 250      ( 28 Jan 2025 06:50 )             33.5     01-27    131[L]  |  95[L]  |  18  ----------------------------<  117[H]  3.8   |  27  |  0.92    Ca    9.3      27 Jan 2025 21:47  Phos  4.1     01-27  Mg     1.8     01-27    TPro  6.3  /  Alb  3.1[L]  /  TBili  0.8  /  DBili  x   /  AST  17  /  ALT  12  /  AlkPhos  117  01-27      Urinalysis Basic - ( 27 Jan 2025 21:47 )    Color: x / Appearance: x / SG: x / pH: x  Gluc: 117 mg/dL / Ketone: x  / Bili: x / Urobili: x   Blood: x / Protein: x / Nitrite: x   Leuk Esterase: x / RBC: x / WBC x   Sq Epi: x / Non Sq Epi: x / Bacteria: x        RADIOLOGY & ADDITIONAL TESTS:      Assessment and Plan:   · Assessment	  75 y/o hx of COPD ( current smoker), Peripheral Neuropathy, HTN present w/ chief complain of syncopal episode At ED, VS notable for hypotension 91/51, / Na of 123 Serum Osm 262, UA negative for Infx, some protenuria.  s/p 1L NS Admitted to medicine for the evaluation and management of Hyponatremia.              Problem/Plan - 1:  ·  Problem: Syncope-stable cliniclly   ·  Plan: EKG w/ normal findings  EF 64%  PT recs no needs.     Problem/Plan - 2:  ·  Problem: Hyponatremia.-improved    ·  Plan: Na 131  Fluid restriction   Sodium tabs.     Problem/Plan - 3:  ·  Problem: HTN (hypertension).   ·  Plan: Normotensive  Monitor BP  DASH diet.     Problem/Plan - 4:  ·  Problem: Prophylactic measure.   ·  Plan: DVT: Lovenox.     Problem/Plan - 5:  ·  Problem: Advance care planning.   ·  Plan: Anticipate clearance for discharge when Na stabilizes.  stable for d/c and f/up pcp for Na on friday    FOR FURTHER COVERAGE TILL 02/04 PLEASE CALL DR LI

## 2025-01-28 NOTE — PROGRESS NOTE ADULT - SUBJECTIVE AND OBJECTIVE BOX
Date of Service 01-28-25 @ 11:17    CHIEF COMPLAINT:Patient is a 74y old  Female who presents with a chief complaint of Syncope.Pt appears comfortable.    	  REVIEW OF SYSTEMS:  CONSTITUTIONAL: No fever, weight loss, or fatigue  EYES: No eye pain, visual disturbances, or discharge  ENT:  No difficulty hearing, tinnitus, vertigo; No sinus or throat pain  NECK: No pain or stiffness  RESPIRATORY: No cough, wheezing, chills or hemoptysis; No Shortness of Breath  CARDIOVASCULAR: No chest pain, palpitations, passing out, dizziness, or leg swelling  GASTROINTESTINAL: No abdominal or epigastric pain. No nausea, vomiting, or hematemesis; No diarrhea or constipation. No melena or hematochezia.  GENITOURINARY: No dysuria, frequency, hematuria, or incontinence  NEUROLOGICAL: No headaches, memory loss, loss of strength, numbness, or tremors  SKIN: No itching, burning, rashes, or lesions   LYMPH Nodes: No enlarged glands  ENDOCRINE: No heat or cold intolerance; No hair loss  MUSCULOSKELETAL: No joint pain or swelling; No muscle, back, or extremity pain  PSYCHIATRIC: No depression, anxiety, mood swings, or difficulty sleeping  HEME/LYMPH: No easy bruising, or bleeding gums  ALLERGY AND IMMUNOLOGIC: No hives or eczema	      PHYSICAL EXAM:  T(C): 36.3 (01-28-25 @ 05:00), Max: 36.8 (01-27-25 @ 20:15)  HR: 84 (01-28-25 @ 05:00) (73 - 84)  BP: 121/71 (01-28-25 @ 05:00) (119/64 - 128/72)  RR: 17 (01-28-25 @ 05:00) (17 - 18)  SpO2: 94% (01-28-25 @ 05:00) (94% - 95%)  Wt(kg): --  I&O's Summary      Appearance: Normal	  HEENT:   Normal oral mucosa, PERRL, EOMI	  Lymphatic: No lymphadenopathy  Cardiovascular: Normal S1 S2, No JVD, No murmurs, No edema  Respiratory: Lungs clear to auscultation	  Psychiatry: A & O x 3, Mood & affect appropriate  Gastrointestinal:  Soft, Non-tender, + BS	  Skin: No rashes, No ecchymoses, No cyanosis	  Neurologic: Non-focal  Extremities: Normal range of motion, No clubbing, cyanosis or edema  Vascular: Peripheral pulses palpable 2+ bilaterally    MEDICATIONS  (STANDING):  enoxaparin Injectable 40 milliGRAM(s) SubCutaneous every 24 hours  lisinopril 20 milliGRAM(s) Oral daily  nicotine -  14 mG/24Hr(s) Patch 1 Patch Transdermal every 24 hours  pantoprazole    Tablet 40 milliGRAM(s) Oral before breakfast  pregabalin 150 milliGRAM(s) Oral three times a day  sodium chloride 1 Gram(s) Oral every 8 hours    	  	  LABS:	 	                                  11.5   5.43  )-----------( 250      ( 28 Jan 2025 06:50 )             33.5     01-28    132[L]  |  98  |  19[H]  ----------------------------<  102[H]  3.8   |  25  |  0.76    Ca    9.3      28 Jan 2025 06:50  Phos  4.0     01-28  Mg     2.0     01-28    TPro  6.3  /  Alb  3.1[L]  /  TBili  0.8  /  DBili  x   /  AST  17  /  ALT  12  /  AlkPhos  117  01-27      TSH: Thyroid Stimulating Hormone, Serum: 2.33 uU/mL (01-24 @ 21:25)      	    < from: CT Chest No Cont (01.27.25 @ 14:06) >  ACC: [id number] EXAM:  CT CHEST   ORDERED BY: CATE MCCORD     PROCEDURE DATE:  01/27/2025          INTERPRETATION:  CLINICAL INFORMATION: 74-year-old female with shortness   of breath    TECHNIQUE: A volumetric CT acquisition of the chest was obtained from the   thoracic inlet to the upper abdomen, without administration of   intravenous contrast. This CT scan was performed with one or more of the   following dose optimization: iterative reconstruction, automatic exposure   control, and/or manual adjustment of mAs and kVp according to the   patient's size. 3D MIP and volume-rendered images were created at the   scanner.    COMPARISON: There is no prior study available for comparison.    FINDINGS:  Lines and Tubes: None    Mediastinum: The thyroid and thoracic inlet are normal. There is 1.2 cm   right paratracheal lymph node.The heart size is within normal limits.   There is trace pericardial effusion. The aorta is normal in course and   caliber, with no significant calcified atheromas. There are   atherosclerotic calcification of coronary vessels. The esophagus is   unremarkable.    Lung: Trachea is patent. There is secretion in bilateral main stem   bronchi. There are mucoid impaction in distal bronchi in the lower lobes.   There is pleural thickening/scarring in left lung apex. There is moderate   emphysema. There is no focal consolidation. There are micronodules   measuring up to 4 mm in upper lobes (for instance 4 mm nodule in right   upper lobe on series 2, image 101, and 2 mm nodule in left upper lobe on   series 2, image 54)    Pleura: There is no pleural effusion or pneumothorax.    Abdomen: There is elevation of left hemidiaphragm. Cholelithiasis is   noted.Limited evaluation of liver, spleen, pancreas, adrenal glands, and   upper pole of kidneys is unremarkable.    Bone and Soft Tissue: Osseous structures are osteopenic. There is no   evidence of osteosclerotic or osteolytic lesions. There are multilevel   degenerative changes of thoracic spine , with disk space narrowing and   osteophytosis. The soft tissue is grossly normal.    IMPRESSION:  Emphysema  Micronodules measuring up to 4 mm with upper lobe predominance. In the   setting of prior smoking, respiratory bronchiolitis is a consideration.    --- End of Report ---            MAGDI CUMMINGS MD; Attending Radiologist  This document has been electronically signed. Jan 27 2025  2:44PM    < end of copied text >

## 2025-01-28 NOTE — PROGRESS NOTE ADULT - SUBJECTIVE AND OBJECTIVE BOX
HPI:  75 y/o hx of COPD ( current smoker), Peripheral Neuropathy, HTN present w/ chief complain of syncopal episode for two minutes, Pt was at her sister house accompanied by , recalls that she began to feel lightheaded when her sister began to ask her if she was feeling ok, she sat down at the chair and does not recall LOC however,  witnesses (  and Sister) reports that patient LOC lasted about two minutes. No abnormal movements were noted, urine or fecal incontinence. After two minutes, patient recovered consciousness w/o post-ictal symptoms. Patient denies any prodromal symptoms except for lightheadedness and blurry vision. Pt reports that she took her BP medication that morning, and that she has not had anything to eat since the day before. Patient admits to poor oral intake, with diet consisting of small bites, lots of tea and crackers.  Patient reports having syncopal episodes when she was younger but contributed to just being easily fainted. Patient denies fevers, chills, chest pain, sob, abdominal pain, nausea/vomiting, diarrhea or constipation. Pt endorsed urinary frequency and nocturia, but denies dysuria.     At ED, VS notable for hypotension 91/51  CMP w/ Na of 123 Serum Osm 262, Glucose 103  UA negative for Infx, some protenuria.   s/p 1L NS      (24 Jan 2025 19:40)      OVERNIGHT EVENTS:    No new overnight events.  Seen and examined at bedside.     REVIEW OF SYSTEMS:      EYES: no acute visual disturbances  NECK: No pain or stiffness  RESPIRATORY: No cough; No shortness of breath  CARDIOVASCULAR: No chest pain, no palpitations  GASTROINTESTINAL: No pain. No nausea, vomiting or diarrhea   NEUROLOGICAL: No headache or numbness, no tremors  MUSCULOSKELETAL: No joint pain, no muscle pain  GENITOURINARY: no dysuria, no frequency, no hesitancy  PSYCHIATRY: no depression, no anxiety  ALL OTHER  ROS negative        Vital Signs Last 24 Hrs  T(C): 36.3 (28 Jan 2025 05:00), Max: 36.8 (27 Jan 2025 20:15)  T(F): 97.4 (28 Jan 2025 05:00), Max: 98.3 (27 Jan 2025 20:15)  HR: 84 (28 Jan 2025 05:00) (73 - 84)  BP: 121/71 (28 Jan 2025 05:00) (119/64 - 128/72)  BP(mean): 88 (28 Jan 2025 05:00) (83 - 91)  RR: 17 (28 Jan 2025 05:00) (17 - 18)  SpO2: 94% (28 Jan 2025 05:00) (94% - 95%)    Parameters below as of 28 Jan 2025 05:00  Patient On (Oxygen Delivery Method): room air        ________________________________________________  PHYSICAL EXAM:    GENERAL: NAD  HEENT: Normocephalic; conjunctivae and sclerae clear;  NECK : supple, no JVD  CHEST/LUNG: Clear to auscultation; Nonlabored  HEART: S1 S2  regular  ABDOMEN: Soft, Nontender, Nondistended; Bowel sounds present  EXTREMITIES: no cyanosis; no LE edema; no calf tenderness  NERVOUS SYSTEM:  Alert; no new deficits  SKIN: warm and dry; No new rashes or lesions    _________________________________________________  CURRENT MEDICATIONS:    MEDICATIONS  (STANDING):  enoxaparin Injectable 40 milliGRAM(s) SubCutaneous every 24 hours  lisinopril 20 milliGRAM(s) Oral daily  nicotine -  14 mG/24Hr(s) Patch 1 Patch Transdermal every 24 hours  pantoprazole    Tablet 40 milliGRAM(s) Oral before breakfast  pregabalin 150 milliGRAM(s) Oral three times a day  sodium chloride 1 Gram(s) Oral every 8 hours    MEDICATIONS  (PRN):  acetaminophen     Tablet .. 650 milliGRAM(s) Oral every 6 hours PRN Temp greater or equal to 38C (100.4F), Mild Pain (1 - 3)  aluminum hydroxide/magnesium hydroxide/simethicone Suspension 30 milliLiter(s) Oral every 4 hours PRN Dyspepsia  melatonin 3 milliGRAM(s) Oral at bedtime PRN Insomnia  ondansetron Injectable 4 milliGRAM(s) IV Push every 8 hours PRN Nausea and/or Vomiting      __________________________________________________  LABS:                          11.5   5.43  )-----------( 250      ( 28 Jan 2025 06:50 )             33.5     01-28    132[L]  |  98  |  19[H]  ----------------------------<  102[H]  3.8   |  25  |  0.76    Ca    9.3      28 Jan 2025 06:50  Phos  4.0     01-28  Mg     2.0     01-28    TPro  6.3  /  Alb  3.1[L]  /  TBili  0.8  /  DBili  x   /  AST  17  /  ALT  12  /  AlkPhos  117  01-27      Urinalysis Basic - ( 28 Jan 2025 06:50 )    Color: x / Appearance: x / SG: x / pH: x  Gluc: 102 mg/dL / Ketone: x  / Bili: x / Urobili: x   Blood: x / Protein: x / Nitrite: x   Leuk Esterase: x / RBC: x / WBC x   Sq Epi: x / Non Sq Epi: x / Bacteria: x      CAPILLARY BLOOD GLUCOSE          __________________________________________________  RADIOLOGY & ADDITIONAL TESTS:    Imaging Personally Reviewed:  YES    < from: CT Chest No Cont (01.27.25 @ 14:06) >  IMPRESSION:  Emphysema  Micronodules measuring up to 4 mm with upper lobe predominance. In the   setting of prior smoking, respiratory bronchiolitis is a consideration.    < end of copied text >    Consultant(s) Notes Reviewed:   YES     Plan of care was discussed with patient and /or primary care giver; all questions and concerns were addressed and care was aligned with patient's wishes.    Plan discussed with attending and consulting physicians.

## 2025-01-29 ENCOUNTER — TRANSCRIPTION ENCOUNTER (OUTPATIENT)
Age: 75
End: 2025-01-29

## 2025-01-29 VITALS
RESPIRATION RATE: 18 BRPM | OXYGEN SATURATION: 93 % | SYSTOLIC BLOOD PRESSURE: 104 MMHG | HEART RATE: 94 BPM | DIASTOLIC BLOOD PRESSURE: 63 MMHG | TEMPERATURE: 98 F

## 2025-01-29 LAB
ANION GAP SERPL CALC-SCNC: 2 MMOL/L — LOW (ref 5–17)
ANION GAP SERPL CALC-SCNC: 5 MMOL/L — SIGNIFICANT CHANGE UP (ref 5–17)
BUN SERPL-MCNC: 30 MG/DL — HIGH (ref 7–18)
BUN SERPL-MCNC: 31 MG/DL — HIGH (ref 7–18)
CALCIUM SERPL-MCNC: 9.5 MG/DL — SIGNIFICANT CHANGE UP (ref 8.4–10.5)
CALCIUM SERPL-MCNC: 9.5 MG/DL — SIGNIFICANT CHANGE UP (ref 8.4–10.5)
CHLORIDE SERPL-SCNC: 100 MMOL/L — SIGNIFICANT CHANGE UP (ref 96–108)
CHLORIDE SERPL-SCNC: 101 MMOL/L — SIGNIFICANT CHANGE UP (ref 96–108)
CHOLEST SERPL-MCNC: 193 MG/DL — SIGNIFICANT CHANGE UP
CO2 SERPL-SCNC: 28 MMOL/L — SIGNIFICANT CHANGE UP (ref 22–31)
CO2 SERPL-SCNC: 30 MMOL/L — SIGNIFICANT CHANGE UP (ref 22–31)
CREAT SERPL-MCNC: 0.91 MG/DL — SIGNIFICANT CHANGE UP (ref 0.5–1.3)
CREAT SERPL-MCNC: 1.18 MG/DL — SIGNIFICANT CHANGE UP (ref 0.5–1.3)
EGFR: 48 ML/MIN/1.73M2 — LOW
EGFR: 66 ML/MIN/1.73M2 — SIGNIFICANT CHANGE UP
GLUCOSE SERPL-MCNC: 106 MG/DL — HIGH (ref 70–99)
GLUCOSE SERPL-MCNC: 109 MG/DL — HIGH (ref 70–99)
HCT VFR BLD CALC: 37.1 % — SIGNIFICANT CHANGE UP (ref 34.5–45)
HDLC SERPL-MCNC: 76 MG/DL — SIGNIFICANT CHANGE UP
HGB BLD-MCNC: 12.3 G/DL — SIGNIFICANT CHANGE UP (ref 11.5–15.5)
LIPID PNL WITH DIRECT LDL SERPL: 101 MG/DL — HIGH
MAGNESIUM SERPL-MCNC: 1.9 MG/DL — SIGNIFICANT CHANGE UP (ref 1.6–2.6)
MCHC RBC-ENTMCNC: 27.6 PG — SIGNIFICANT CHANGE UP (ref 27–34)
MCHC RBC-ENTMCNC: 33.2 G/DL — SIGNIFICANT CHANGE UP (ref 32–36)
MCV RBC AUTO: 83.4 FL — SIGNIFICANT CHANGE UP (ref 80–100)
NON HDL CHOLESTEROL: 117 MG/DL — SIGNIFICANT CHANGE UP
NRBC # BLD: 0 /100 WBCS — SIGNIFICANT CHANGE UP (ref 0–0)
NRBC BLD-RTO: 0 /100 WBCS — SIGNIFICANT CHANGE UP (ref 0–0)
PHOSPHATE SERPL-MCNC: 3.3 MG/DL — SIGNIFICANT CHANGE UP (ref 2.5–4.5)
PLATELET # BLD AUTO: 297 K/UL — SIGNIFICANT CHANGE UP (ref 150–400)
POTASSIUM SERPL-MCNC: 4 MMOL/L — SIGNIFICANT CHANGE UP (ref 3.5–5.3)
POTASSIUM SERPL-MCNC: 4.3 MMOL/L — SIGNIFICANT CHANGE UP (ref 3.5–5.3)
POTASSIUM SERPL-SCNC: 4 MMOL/L — SIGNIFICANT CHANGE UP (ref 3.5–5.3)
POTASSIUM SERPL-SCNC: 4.3 MMOL/L — SIGNIFICANT CHANGE UP (ref 3.5–5.3)
RBC # BLD: 4.45 M/UL — SIGNIFICANT CHANGE UP (ref 3.8–5.2)
RBC # FLD: 13.6 % — SIGNIFICANT CHANGE UP (ref 10.3–14.5)
SODIUM SERPL-SCNC: 131 MMOL/L — LOW (ref 135–145)
SODIUM SERPL-SCNC: 135 MMOL/L — SIGNIFICANT CHANGE UP (ref 135–145)
TRIGL SERPL-MCNC: 92 MG/DL — SIGNIFICANT CHANGE UP
URATE SERPL-MCNC: 4.8 MG/DL — SIGNIFICANT CHANGE UP (ref 2.5–7)
WBC # BLD: 10.48 K/UL — SIGNIFICANT CHANGE UP (ref 3.8–10.5)
WBC # FLD AUTO: 10.48 K/UL — SIGNIFICANT CHANGE UP (ref 3.8–10.5)

## 2025-01-29 PROCEDURE — 83930 ASSAY OF BLOOD OSMOLALITY: CPT

## 2025-01-29 PROCEDURE — 83735 ASSAY OF MAGNESIUM: CPT

## 2025-01-29 PROCEDURE — 81001 URINALYSIS AUTO W/SCOPE: CPT

## 2025-01-29 PROCEDURE — 84484 ASSAY OF TROPONIN QUANT: CPT

## 2025-01-29 PROCEDURE — 80048 BASIC METABOLIC PNL TOTAL CA: CPT

## 2025-01-29 PROCEDURE — 36415 COLL VENOUS BLD VENIPUNCTURE: CPT

## 2025-01-29 PROCEDURE — 82962 GLUCOSE BLOOD TEST: CPT

## 2025-01-29 PROCEDURE — 82533 TOTAL CORTISOL: CPT

## 2025-01-29 PROCEDURE — 97161 PT EVAL LOW COMPLEX 20 MIN: CPT

## 2025-01-29 PROCEDURE — 84133 ASSAY OF URINE POTASSIUM: CPT

## 2025-01-29 PROCEDURE — 80061 LIPID PANEL: CPT

## 2025-01-29 PROCEDURE — 84156 ASSAY OF PROTEIN URINE: CPT

## 2025-01-29 PROCEDURE — 83935 ASSAY OF URINE OSMOLALITY: CPT

## 2025-01-29 PROCEDURE — 84449 ASSAY OF TRANSCORTIN: CPT

## 2025-01-29 PROCEDURE — 83880 ASSAY OF NATRIURETIC PEPTIDE: CPT

## 2025-01-29 PROCEDURE — 84300 ASSAY OF URINE SODIUM: CPT

## 2025-01-29 PROCEDURE — 82570 ASSAY OF URINE CREATININE: CPT

## 2025-01-29 PROCEDURE — 71250 CT THORAX DX C-: CPT | Mod: MC

## 2025-01-29 PROCEDURE — 93005 ELECTROCARDIOGRAM TRACING: CPT

## 2025-01-29 PROCEDURE — 85025 COMPLETE CBC W/AUTO DIFF WBC: CPT

## 2025-01-29 PROCEDURE — 99285 EMERGENCY DEPT VISIT HI MDM: CPT | Mod: 25

## 2025-01-29 PROCEDURE — 84540 ASSAY OF URINE/UREA-N: CPT

## 2025-01-29 PROCEDURE — 93306 TTE W/DOPPLER COMPLETE: CPT

## 2025-01-29 PROCEDURE — 84443 ASSAY THYROID STIM HORMONE: CPT

## 2025-01-29 PROCEDURE — 85027 COMPLETE CBC AUTOMATED: CPT

## 2025-01-29 PROCEDURE — 84100 ASSAY OF PHOSPHORUS: CPT

## 2025-01-29 PROCEDURE — 71045 X-RAY EXAM CHEST 1 VIEW: CPT

## 2025-01-29 PROCEDURE — 80053 COMPREHEN METABOLIC PANEL: CPT

## 2025-01-29 PROCEDURE — 84550 ASSAY OF BLOOD/URIC ACID: CPT

## 2025-01-29 RX ORDER — INDAPAMIDE 1.25 MG/1
1 TABLET, FILM COATED ORAL
Refills: 0 | DISCHARGE

## 2025-01-29 RX ORDER — ACETAMINOPHEN 160 MG/5ML
2 SUSPENSION ORAL
Qty: 0 | Refills: 0 | DISCHARGE
Start: 2025-01-29

## 2025-01-29 RX ORDER — NICOTINE POLACRILEX 4 MG/1
1 LOZENGE ORAL
Qty: 2 | Refills: 0
Start: 2025-01-29 | End: 2025-02-27

## 2025-01-29 RX ORDER — PREGABALIN CAPSULES, CV 225 MG/1
1 CAPSULE ORAL
Qty: 0 | Refills: 0 | DISCHARGE
Start: 2025-01-29

## 2025-01-29 RX ADMIN — PREGABALIN CAPSULES, CV 150 MILLIGRAM(S): 225 CAPSULE ORAL at 06:06

## 2025-01-29 RX ADMIN — PANTOPRAZOLE 40 MILLIGRAM(S): 20 TABLET, DELAYED RELEASE ORAL at 06:06

## 2025-01-29 RX ADMIN — NICOTINE POLACRILEX 1 PATCH: 4 LOZENGE ORAL at 07:54

## 2025-01-29 RX ADMIN — NICOTINE POLACRILEX 1 PATCH: 4 LOZENGE ORAL at 15:05

## 2025-01-29 RX ADMIN — PREGABALIN CAPSULES, CV 150 MILLIGRAM(S): 225 CAPSULE ORAL at 13:02

## 2025-01-29 RX ADMIN — Medication 10 MILLIGRAM(S): at 06:06

## 2025-01-29 RX ADMIN — Medication 1 GRAM(S): at 13:01

## 2025-01-29 RX ADMIN — Medication 1 GRAM(S): at 06:06

## 2025-01-29 NOTE — PROGRESS NOTE ADULT - PROBLEM SELECTOR PLAN 3
Normotensive  Monitor BP  DASH diet

## 2025-01-29 NOTE — DISCHARGE NOTE NURSING/CASE MANAGEMENT/SOCIAL WORK - NSDCPEWEB_GEN_ALL_CORE
[facility] for Tobacco Control website --- [email or web address] --- [email or web address]

## 2025-01-29 NOTE — PROGRESS NOTE ADULT - PROVIDER SPECIALTY LIST ADULT
Cardiology
Internal Medicine
Internal Medicine
Nephrology
Nephrology
Cardiology
Internal Medicine
Nephrology
Internal Medicine

## 2025-01-29 NOTE — PROGRESS NOTE ADULT - PROBLEM SELECTOR PLAN 1
EKG w/ normal findings  EF 64%  PT recs no needs

## 2025-01-29 NOTE — PROGRESS NOTE ADULT - SUBJECTIVE AND OBJECTIVE BOX
Date of Service 01-29-25 @ 11:44    CHIEF COMPLAINT:Patient is a 74y old  Female who presents with a chief complaint of Syncope.Pt appears comfortable,s/p nausea and vomiting.    	  REVIEW OF SYSTEMS:  CONSTITUTIONAL: No fever, weight loss, or fatigue  EYES: No eye pain, visual disturbances, or discharge  ENT:  No difficulty hearing, tinnitus, vertigo; No sinus or throat pain  NECK: No pain or stiffness  RESPIRATORY: No cough, wheezing, chills or hemoptysis; No Shortness of Breath  CARDIOVASCULAR: No chest pain, palpitations, passing out, dizziness, or leg swelling  GASTROINTESTINAL: No abdominal or epigastric pain. No nausea, vomiting, or hematemesis; No diarrhea or constipation. No melena or hematochezia.  GENITOURINARY: No dysuria, frequency, hematuria, or incontinence  NEUROLOGICAL: No headaches, memory loss, loss of strength, numbness, or tremors  SKIN: No itching, burning, rashes, or lesions   LYMPH Nodes: No enlarged glands  ENDOCRINE: No heat or cold intolerance; No hair loss  MUSCULOSKELETAL: No joint pain or swelling; No muscle, back, or extremity pain  PSYCHIATRIC: No depression, anxiety, mood swings, or difficulty sleeping  HEME/LYMPH: No easy bruising, or bleeding gums  ALLERGY AND IMMUNOLOGIC: No hives or eczema	        PHYSICAL EXAM:  T(C): 36.5 (01-29-25 @ 05:10), Max: 36.8 (01-28-25 @ 16:45)  HR: 101 (01-29-25 @ 06:41) (70 - 113)  BP: 112/70 (01-29-25 @ 06:41) (97/50 - 121/80)  RR: 18 (01-29-25 @ 05:10) (17 - 18)  SpO2: 94% (01-29-25 @ 05:10) (94% - 95%)  Wt(kg): --  I&O's Summary      Appearance: Normal	  HEENT:   Normal oral mucosa, PERRL, EOMI	  Lymphatic: No lymphadenopathy  Cardiovascular: Normal S1 S2, No JVD, No murmurs, No edema  Respiratory: Lungs clear to auscultation	  Psychiatry: A & O x 3, Mood & affect appropriate  Gastrointestinal:  Soft, Non-tender, + BS	  Skin: No rashes, No ecchymoses, No cyanosis	  Neurologic: Non-focal  Extremities: Normal range of motion, No clubbing, cyanosis or edema  Vascular: Peripheral pulses palpable 2+ bilaterally    MEDICATIONS  (STANDING):  enoxaparin Injectable 40 milliGRAM(s) SubCutaneous every 24 hours  lisinopril 10 milliGRAM(s) Oral daily  nicotine -  14 mG/24Hr(s) Patch 1 Patch Transdermal every 24 hours  pantoprazole    Tablet 40 milliGRAM(s) Oral before breakfast  pregabalin 150 milliGRAM(s) Oral three times a day  sodium chloride 1 Gram(s) Oral every 8 hours      	  	  LABS:	 	                            12.3   10.48 )-----------( 297      ( 29 Jan 2025 07:08 )             37.1     01-29    131[L]  |  101  |  31[H]  ----------------------------<  109[H]  4.0   |  28  |  0.91    Ca    9.5      29 Jan 2025 07:08  Phos  3.3     01-29  Mg     1.9     01-29        Lipid Profile: Cholesterol 193  HDL 76  TG 92  Ldl calc 101    TSH: Thyroid Stimulating Hormone, Serum: 2.33 uU/mL (01-24 @ 21:25)

## 2025-01-29 NOTE — DISCHARGE NOTE NURSING/CASE MANAGEMENT/SOCIAL WORK - NSDCPEFALRISK_GEN_ALL_CORE
For information on Fall & Injury Prevention, visit: [email or web address] OR  [email or web address] OR  https://www.cdc.gov/steadi/patient.html

## 2025-01-29 NOTE — PROGRESS NOTE ADULT - SUBJECTIVE AND OBJECTIVE BOX
JOSE NAZARIO  MR# [id number]  74yFemale        Patient is a 74y old  Female who presents with a chief complaint of Syncope (29 Jan 2025 11:44)      INTERVAL HPI/OVERNIGHT EVENTS:  Patient seen and examined at bedside. No notations of chest pain, palpitation, SOB, orthopnea, nausea, vomiting or abdominal pain.    ALLERGIES  sulfa drugs (Unknown)      MEDICATIONS  acetaminophen     Tablet .. 650 milliGRAM(s) Oral every 6 hours PRN Temp greater or equal to 38C (100.4F), Mild Pain (1 - 3)  aluminum hydroxide/magnesium hydroxide/simethicone Suspension 30 milliLiter(s) Oral every 4 hours PRN Dyspepsia  enoxaparin Injectable 40 milliGRAM(s) SubCutaneous every 24 hours  lisinopril 10 milliGRAM(s) Oral daily  melatonin 3 milliGRAM(s) Oral at bedtime PRN Insomnia  nicotine -  14 mG/24Hr(s) Patch 1 Patch Transdermal every 24 hours  ondansetron Injectable 4 milliGRAM(s) IV Push every 8 hours PRN Nausea and/or Vomiting  pantoprazole    Tablet 40 milliGRAM(s) Oral before breakfast  pregabalin 150 milliGRAM(s) Oral three times a day  sodium chloride 1 Gram(s) Oral every 8 hours              REVIEW OF SYSTEMS:  CONSTITUTIONAL: No fever, weight loss, or fatigue  EYES: No eye pain, visual disturbances, or discharge  ENT:  No difficulty hearing, tinnitus, vertigo; No sinus or throat pain  NECK: No pain or stiffness  RESPIRATORY: No cough, wheezing, chills or hemoptysis; No Shortness of Breath  CARDIOVASCULAR: No chest pain, palpitations, passing out, dizziness, or leg swelling  GASTROINTESTINAL: No abdominal or epigastric pain. No nausea, vomiting, or hematemesis; No diarrhea or constipation. No melena or hematochezia.  GENITOURINARY: No dysuria, frequency, hematuria, or incontinence  NEUROLOGICAL: No headaches, memory loss, loss of strength, numbness, or tremors  SKIN: No itching, burning, rashes, or lesions   LYMPH Nodes: No enlarged glands  ENDOCRINE: No heat or cold intolerance; No hair loss  MUSCULOSKELETAL: No joint pain or swelling; No muscle, back, or extremity pain  PSYCHIATRIC: No depression, anxiety, mood swings, or difficulty sleeping  HEME/LYMPH: No easy bruising, or bleeding gums  ALLERGY AND IMMUNOLOGIC: No hives or eczema	    [ ] All others negative	  [ ] Unable to obtain      T(C): 36.4 (01-29-25 @ 13:55), Max: 36.8 (01-28-25 @ 16:45)  T(F): 97.6 (01-29-25 @ 13:55), Max: 98.2 (01-28-25 @ 16:45)  HR: 94 (01-29-25 @ 13:55) (93 - 113)  BP: 104/63 (01-29-25 @ 13:55) (104/63 - 121/80)  RR: 18 (01-29-25 @ 13:55) (18 - 18)  SpO2: 93% (01-29-25 @ 13:55) (93% - 95%)  Wt(kg): --    I&O's Summary        PHYSICAL EXAM:  A X O x  HEAD:  Atraumatic, Normocephalic  EYES: EOMI, PERRLA, conjunctiva and sclera clear  NECK: Supple, No JVD, Normal thyroid  Resp: CTAB, No crackles, wheezing,   CVS: Regular rate and rhythm; No discernable murmurs, rubs, or gallops  ABD: Soft, Nontender, Nondistended; Bowel sounds present  EXTREMITIES:  2+ Peripheral Pulses, No edema  LYMPH: No dicernable lymphadenopathy noted  GENERAL: NAD, well-groomed, well-developed      LABS:                        12.3   10.48 )-----------( 297      ( 29 Jan 2025 07:08 )             37.1     01-29    131[L]  |  101  |  31[H]  ----------------------------<  109[H]  4.0   |  28  |  0.91    Ca    9.5      29 Jan 2025 07:08  Phos  3.3     01-29  Mg     1.9     01-29        Urinalysis Basic - ( 29 Jan 2025 07:08 )    Color: x / Appearance: x / SG: x / pH: x  Gluc: 109 mg/dL / Ketone: x  / Bili: x / Urobili: x   Blood: x / Protein: x / Nitrite: x   Leuk Esterase: x / RBC: x / WBC x   Sq Epi: x / Non Sq Epi: x / Bacteria: x      CAPILLARY BLOOD GLUCOSE          Troponins:  ProBNP:  Lipid Profile:   HgA1c:  TSH:           RADIOLOGY & ADDITIONAL TESTS:    Imaging Personally Reviewed:  [ ] YES  [ ] NO      Consultant(s) Notes Reviewed:  [x ] YES  [ ] NO    Care Discussed with Consultants/Other Providers [ x] YES  [ ] NO          PAST MEDICAL & SURGICAL HISTORY:  HTN (hypertension)      HLD (hyperlipidemia)      H/O neuropathy      History of COPD      History of appendectomy            Hyponatremia, hypo-osmolarity, or hypo-osmolar hyponatremia    FH: HTN (hypertension)    Family history of diabetes mellitus (DM)    Handoff    MEWS Score    HTN (hypertension)    HLD (hyperlipidemia)    H/O neuropathy    History of COPD    Hyponatremia    Hyponatremia    HTN (hypertension)    H/O neuropathy    History of COPD    HLD (hyperlipidemia)    Syncope    Prophylactic measure    Advance care planning    History of appendectomy    A) SYNCOPE    90+    Syncope    HTN (hypertension)    HLD (hyperlipidemia)    SysAdmin_VisitLink

## 2025-01-29 NOTE — PROGRESS NOTE ADULT - SUBJECTIVE AND OBJECTIVE BOX
Efraín Castelan MD (Nephrology progress note)  [address], [facility],  [address],  [id number]  TEl: [phone]  Cell: [phone]    Patient is a 74y Female seen and evaluated at bedside. Vital signs, laboratory data, imaging studies, consult notes reviewed done within past 24 hours. Overnight events noted.    Allergies    sulfa drugs (Unknown)    Intolerances        ROS:  CONSTITUTIONAL: No fever or chills.  HEENT: No headcahe or visual disturbances.  RESPIRATORY: No shortness of breath, cough, hemoptysis or wheezing  CARDIOVASCULAR: No Chest pain, shortness of breath, palpitations, dizziness, syncope, orthopnea, PND or leg swelling.  GASTROINTESTINAL: No abdominal pain, nausea, vomiting, diarrhea, hematemesis or blood per rectum.  GENITOURINARY: No urinary frequency, urgency, gross hematuria, dysuria, flank or supra pubic pain, difficulty passing urine.  NEUROLOGICAL: No headache, focal limb weakness, tingling or numbness or seizure like activity  SKIN: No skin rash or lesion  MUSCULOSKELETAL: No leg pain, calf pain or swelling    VITALS:    T(C): 36.5 (01-29-25 @ 05:10), Max: 36.8 (01-28-25 @ 16:45)  HR: 101 (01-29-25 @ 06:41) (70 - 113)  BP: 112/70 (01-29-25 @ 06:41) (97/50 - 121/80)  RR: 18 (01-29-25 @ 05:10) (17 - 18)  SpO2: 94% (01-29-25 @ 05:10) (94% - 95%)  CAPILLARY BLOOD GLUCOSE          MEDICATIONS  (STANDING):  enoxaparin Injectable 40 milliGRAM(s) SubCutaneous every 24 hours  lisinopril 10 milliGRAM(s) Oral daily  nicotine -  14 mG/24Hr(s) Patch 1 Patch Transdermal every 24 hours  pantoprazole    Tablet 40 milliGRAM(s) Oral before breakfast  pregabalin 150 milliGRAM(s) Oral three times a day  sodium chloride 1 Gram(s) Oral every 8 hours    MEDICATIONS  (PRN):  acetaminophen     Tablet .. 650 milliGRAM(s) Oral every 6 hours PRN Temp greater or equal to 38C (100.4F), Mild Pain (1 - 3)  aluminum hydroxide/magnesium hydroxide/simethicone Suspension 30 milliLiter(s) Oral every 4 hours PRN Dyspepsia  melatonin 3 milliGRAM(s) Oral at bedtime PRN Insomnia  ondansetron Injectable 4 milliGRAM(s) IV Push every 8 hours PRN Nausea and/or Vomiting      PHYSICAL EXAM:  GENERAL: Alert, awake and oriented x3 in no distress  HEENT: JORDIN, EOMI, neck supple, no JVP, no carotid bruit, oral mucosa moist and pink.  CHEST/LUNG: Bilateral clear breath sounds, no rales, no crepitations, no rhonchi, no wheezing  HEART: Regular rate and rhythm, ANNA II/VI at LPSB, no gallops, no rub   ABDOMEN: Soft, nontender, non distended, bowel sounds present, no palpable organomegaly  : No flank or supra pubic tenderness.  EXTREMITIES: Peripheral pulses are palpable, no pedal edema  Neurology: AAOx3, no focal neurological deficit  SKIN: No rash or skin lesion  Musculoskeletal: No joint deformity or spinal tenderness.      Vascular ACCESS:     LABS:                        12.3   10.48 )-----------( 297      ( 29 Jan 2025 07:08 )             37.1     01-29    131[L]  |  101  |  31[H]  ----------------------------<  109[H]  4.0   |  28  |  0.91    Ca    9.5      29 Jan 2025 07:08  Phos  3.3     01-29  Mg     1.9     01-29      Cholesterol: 193 mg/dL (01-29 @ 07:08)  Uric Acid: 4.8 mg/dL [2.5 - 7.0] (01-29 @ 07:08)      Urinalysis Basic - ( 29 Jan 2025 07:08 )    Color: x / Appearance: x / SG: x / pH: x  Gluc: 109 mg/dL / Ketone: x  / Bili: x / Urobili: x   Blood: x / Protein: x / Nitrite: x   Leuk Esterase: x / RBC: x / WBC x   Sq Epi: x / Non Sq Epi: x / Bacteria: x      Osmolality, Random Urine: 614 mos/kg (01-28 @ 21:00)  Sodium, Random Urine: 65 mmol/L (01-28 @ 21:00)        RADIOLOGY & ADDITIONAL STUDIES:    Imaging Personally Reviewed:  [x] YES  [ ] NO    Consultant(s) Notes Reviewed:  [x] YES  [ ] NO    Care Discussed with Primary team/ Other Providers [x] YES  [ ] NO       Efraín Castelan MD (Nephrology progress note)  [address] [facility],  [address],  [id number]  TEl: [phone]  Cell: [phone]    Patient is a 74y Female seen and evaluated at bedside. Vital signs, laboratory data, imaging studies, consult notes reviewed done within past 24 hours. Overnight events noted. Interval stable renal function. Na level 131.    Allergies    sulfa drugs (Unknown)    Intolerances        ROS:  CONSTITUTIONAL: No fever or chills.  HEENT: No headache or visual disturbances.  RESPIRATORY: No shortness of breath, cough, hemoptysis or wheezing  CARDIOVASCULAR: No Chest pain, shortness of breath, palpitations, dizziness, syncope, orthopnea, PND or leg swelling.  GASTROINTESTINAL: No abdominal pain, nausea, vomiting, diarrhea, hematemesis or blood per rectum.  GENITOURINARY: No urinary frequency, urgency, gross hematuria, dysuria, flank or supra pubic pain, difficulty passing urine.  NEUROLOGICAL: No headache, focal limb weakness, tingling or numbness or seizure like activity  SKIN: No skin rash or lesion  MUSCULOSKELETAL: No leg pain, calf pain or swelling    VITALS:    T(C): 36.5 (01-29-25 @ 05:10), Max: 36.8 (01-28-25 @ 16:45)  HR: 101 (01-29-25 @ 06:41) (70 - 113)  BP: 112/70 (01-29-25 @ 06:41) (97/50 - 121/80)  RR: 18 (01-29-25 @ 05:10) (17 - 18)  SpO2: 94% (01-29-25 @ 05:10) (94% - 95%)  CAPILLARY BLOOD GLUCOSE          MEDICATIONS  (STANDING):  enoxaparin Injectable 40 milliGRAM(s) SubCutaneous every 24 hours  lisinopril 10 milliGRAM(s) Oral daily  nicotine -  14 mG/24Hr(s) Patch 1 Patch Transdermal every 24 hours  pantoprazole    Tablet 40 milliGRAM(s) Oral before breakfast  pregabalin 150 milliGRAM(s) Oral three times a day  sodium chloride 1 Gram(s) Oral every 8 hours    MEDICATIONS  (PRN):  acetaminophen     Tablet .. 650 milliGRAM(s) Oral every 6 hours PRN Temp greater or equal to 38C (100.4F), Mild Pain (1 - 3)  aluminum hydroxide/magnesium hydroxide/simethicone Suspension 30 milliLiter(s) Oral every 4 hours PRN Dyspepsia  melatonin 3 milliGRAM(s) Oral at bedtime PRN Insomnia  ondansetron Injectable 4 milliGRAM(s) IV Push every 8 hours PRN Nausea and/or Vomiting      PHYSICAL EXAM:  GENERAL: Alert, awake and oriented x3 in no distress  HEENT: JORDIN, EOMI, neck supple, no JVP, no carotid bruit, oral mucosa moist and pink.  CHEST/LUNG: Bilateral clear breath sounds, no rales, no crepitations, no rhonchi, no wheezing  HEART: Regular rate and rhythm, ANNA II/VI at LPSB, no gallops, no rub   ABDOMEN: Soft, nontender, non distended, bowel sounds present, no palpable organomegaly  : No flank or supra pubic tenderness.  EXTREMITIES: Peripheral pulses are palpable, no pedal edema  Neurology: AAOx3, no focal neurological deficit  SKIN: No rash or skin lesion  Musculoskeletal: No joint deformity or spinal tenderness.      Vascular ACCESS: None    LABS:                        12.3   10.48 )-----------( 297      ( 29 Jan 2025 07:08 )             37.1     01-29    131[L]  |  101  |  31[H]  ----------------------------<  109[H]  4.0   |  28  |  0.91    Ca    9.5      29 Jan 2025 07:08  Phos  3.3     01-29  Mg     1.9     01-29      Cholesterol: 193 mg/dL (01-29 @ 07:08)  Uric Acid: 4.8 mg/dL [2.5 - 7.0] (01-29 @ 07:08)      Urinalysis Basic - ( 29 Jan 2025 07:08 )    Color: x / Appearance: x / SG: x / pH: x  Gluc: 109 mg/dL / Ketone: x  / Bili: x / Urobili: x   Blood: x / Protein: x / Nitrite: x   Leuk Esterase: x / RBC: x / WBC x   Sq Epi: x / Non Sq Epi: x / Bacteria: x      Osmolality, Random Urine: 614 mos/kg (01-28 @ 21:00)  Sodium, Random Urine: 65 mmol/L (01-28 @ 21:00)        RADIOLOGY & ADDITIONAL STUDIES:    Imaging Personally Reviewed:  [x] YES  [ ] NO    Consultant(s) Notes Reviewed:  [x] YES  [ ] NO    Care Discussed with Primary team/ Other Providers [x] YES  [ ] NO

## 2025-01-29 NOTE — DISCHARGE NOTE NURSING/CASE MANAGEMENT/SOCIAL WORK - PATIENT PORTAL LINK FT
You can access the FollowMyHealth Patient Portal offered by [facility] by registering at the following website: [email or web address]. By joining [facility]’s FollowMyHealth portal, you will also be able to view your health information using other applications (apps) compatible with our system.

## 2025-01-29 NOTE — PROGRESS NOTE ADULT - PROBLEM SELECTOR PLAN 5
Anticipate clearance for discharge when Na stabilizes

## 2025-01-29 NOTE — DISCHARGE NOTE NURSING/CASE MANAGEMENT/SOCIAL WORK - FINANCIAL ASSISTANCE
[facility] provides services at a reduced cost to those who are determined to be eligible through [facility]’s financial assistance program. Information regarding [facility]’s financial assistance program can be found by going to [email or web address] or by calling [phone].

## 2025-01-29 NOTE — DISCHARGE NOTE NURSING/CASE MANAGEMENT/SOCIAL WORK - NSDCPEEMAIL_GEN_ALL_CORE
[facility] email [email or web address]

## 2025-01-30 ENCOUNTER — TRANSCRIPTION ENCOUNTER (OUTPATIENT)
Age: 75
End: 2025-01-30

## 2025-02-04 LAB
CORTICOSTEROID BINDING GLOBULIN RESULT: 2.3 MG/DL — SIGNIFICANT CHANGE UP
CORTIS F/TOTAL MFR SERPL: 14 % — SIGNIFICANT CHANGE UP
CORTIS SERPL-MCNC: 16 UG/DL — SIGNIFICANT CHANGE UP
CORTISOL, FREE RESULT: 2.3 UG/DL — HIGH

## (undated) DEVICE — DRSG MASTISOL

## (undated) DEVICE — DRSG STERISTRIPS 0.5 X 4"

## (undated) DEVICE — SUT POLYSORB 0 30" GU-46

## (undated) DEVICE — GLV 6.5 PROTEXIS (WHITE)

## (undated) DEVICE — DRAIN RESERVOIR FOR JACKSON PRATT 100CC CARDINAL

## (undated) DEVICE — Device

## (undated) DEVICE — TUBING STRYKEFLOW II SUCTION / IRRIGATOR

## (undated) DEVICE — SUT SILK 2-0 18" FS

## (undated) DEVICE — STAPLER COVIDIEN ENDO GIA STANDARD HANDLE

## (undated) DEVICE — TROCAR ETHICON ENDOPATH XCEL HASSON BLUNT TIP 12MM X 100MM STABILITY

## (undated) DEVICE — VENODYNE/SCD SLEEVE CALF MEDIUM

## (undated) DEVICE — SOL IRR POUR NS 0.9% 1500ML

## (undated) DEVICE — SUT BIOSYN 4-0 18" P-12

## (undated) DEVICE — LIGASURE MARYLAND 37CM

## (undated) DEVICE — GLV 8 PROTEXIS (WHITE)

## (undated) DEVICE — NDL HYPO REGULAR BEVEL 25G X 1.5" (BLUE)

## (undated) DEVICE — LIGASURE MARYLAND 44CM

## (undated) DEVICE — WARMING BLANKET UPPER ADULT

## (undated) DEVICE — D HELP - CLEARVIEW CLEARIFY SYSTEM

## (undated) DEVICE — DRSG 2X2

## (undated) DEVICE — SET TISSEL EASYSPRAY

## (undated) DEVICE — SPONGE ENDO PEANUT 5MM

## (undated) DEVICE — PACK GENERAL LAPAROSCOPY

## (undated) DEVICE — ENDOCATCH 10MM SPECIMEN POUCH

## (undated) DEVICE — APPLICATOR FOR TISSEEL DUPLOTIP W SNAP LOCK 5MMX32CM

## (undated) DEVICE — TUBING STRYKER PNEUMOSURE HEATED RTP

## (undated) DEVICE — ELCTR GROUNDING PAD ADULT COVIDIEN

## (undated) DEVICE — TROCAR COVIDIEN VERSAPORT BLADELESS OPTICAL 5MM STANDARD

## (undated) DEVICE — GLV 8 PROTEXIS (BLUE)

## (undated) DEVICE — GLV 6.5 PROTEXIS (BLUE)

## (undated) DEVICE — FOR-ESU VALLEYLAB T7E14848DX: Type: DURABLE MEDICAL EQUIPMENT